# Patient Record
Sex: FEMALE | Race: BLACK OR AFRICAN AMERICAN | Employment: OTHER | URBAN - METROPOLITAN AREA
[De-identification: names, ages, dates, MRNs, and addresses within clinical notes are randomized per-mention and may not be internally consistent; named-entity substitution may affect disease eponyms.]

---

## 2017-01-24 RX ORDER — POTASSIUM CHLORIDE 20 MEQ/1
20 TABLET, EXTENDED RELEASE ORAL DAILY
Qty: 90 TAB | Refills: 3 | Status: SHIPPED | OUTPATIENT
Start: 2017-01-24 | End: 2018-01-29 | Stop reason: SDUPTHER

## 2017-01-24 RX ORDER — TERAZOSIN 5 MG/1
5 CAPSULE ORAL
Qty: 90 CAP | Refills: 3 | Status: CANCELLED | OUTPATIENT
Start: 2017-01-24

## 2017-01-24 NOTE — TELEPHONE ENCOUNTER
Medication refill per verbal order Dr. Yvan Huddleston. Will need to contact mail order in regards to Terazosin. Still has refills remaining.

## 2017-01-24 NOTE — TELEPHONE ENCOUNTER
Pt states she gets her medication thru [employer] they mail it to her she is 80yrs old she can't go to pharmacy so she wants it sent to her [phone]

## 2017-02-08 ENCOUNTER — OFFICE VISIT (OUTPATIENT)
Dept: CARDIOLOGY CLINIC | Age: 82
End: 2017-02-08

## 2017-02-08 VITALS
HEART RATE: 57 BPM | BODY MASS INDEX: 25.49 KG/M2 | HEIGHT: 61 IN | SYSTOLIC BLOOD PRESSURE: 100 MMHG | DIASTOLIC BLOOD PRESSURE: 52 MMHG | OXYGEN SATURATION: 96 % | WEIGHT: 135 LBS

## 2017-02-08 DIAGNOSIS — I48.0 PAROXYSMAL ATRIAL FIBRILLATION (HCC): Primary | ICD-10-CM

## 2017-02-08 DIAGNOSIS — I35.9 AORTIC VALVE DISORDER: ICD-10-CM

## 2017-02-08 DIAGNOSIS — I10 ESSENTIAL HYPERTENSION, BENIGN: ICD-10-CM

## 2017-02-08 NOTE — PROGRESS NOTES
HISTORY OF PRESENT ILLNESS  Susie Paz is a 80 y.o. female. HPI  She looks marvelous. She appears to be at least ten years younger than her stated age of [de-identified]. She does not have hearing loss and she has all of her teeth with the exception of a few missing teeth in the back. She does not have much joint problems either. She is very alert and intelligent. She has had very little, if any, palpitations. She denies chest pain, resting dyspnea, orthopnea or PND. She does have very mild dyspnea on exertion. Her renal function is adequate with BUN/creatinine at 20 and 0.91, respectively, on 08/20/2016. Her cholesterol profile is excellent with HDL all the way up to 79 and LDL of 89. Her hemoglobin A1C was at 5.7%. She has a history of hypertension and acute pulmonary edema in 1988 at which time she had normal cardiac catheterization and normal left ventricular systolic function with hyperdynamic contractions. There was evidence of left ventricular diastolic dysfunction at that time which was felt to be the etiology of her pulmonary edema.    She had the atrial fibrillation for which she was anticoagulated with Coumadin for a while until April 2012 when she was seen in my office when she was found to be in sinus rhythm. Because of the fact that she was in sinus rhythm and her age was in the mid 80s, a decision was made to discontinue Coumadin altogether. She has since remained in sinus rhythm. Review of Systems   Constitutional: Negative for malaise/fatigue and weight loss. HENT: Negative for hearing loss. Eyes: Negative for blurred vision and double vision. Respiratory: Negative for shortness of breath. Cardiovascular: Positive for leg swelling. Negative for chest pain, palpitations, orthopnea, claudication and PND. Gastrointestinal: Negative for blood in stool, heartburn and melena. Genitourinary: Negative for dysuria, frequency, hematuria and urgency. Musculoskeletal: Positive for joint pain. Negative for back pain. Skin: Negative for rash. Neurological: Negative for dizziness, loss of consciousness, weakness and headaches. Psychiatric/Behavioral: Negative for depression and memory loss. Physical Exam   Constitutional: She is oriented to person, place, and time. She appears well-developed and well-nourished. HENT:   Head: Normocephalic and atraumatic. Eyes: Conjunctivae are normal. Pupils are equal, round, and reactive to light. Neck: Normal range of motion. Neck supple. No JVD present. Cardiovascular: Regular rhythm, S1 normal and S2 normal.   No extrasystoles are present. Bradycardia present. PMI is not displaced. Exam reveals no gallop and no friction rub. Murmur heard. Harsh early systolic murmur is present with a grade of 2/6  at the upper right sternal border radiating to the neck  Pulses:       Carotid pulses are 3+ on the right side with bruit, and 3+ on the left side with bruit. Pulmonary/Chest: Effort normal. She has no rales. Abdominal: Soft. There is no tenderness. Musculoskeletal: She exhibits edema. trace   Neurological: She is alert and oriented to person, place, and time. No cranial nerve deficit. Skin: Skin is warm and dry. Psychiatric: She has a normal mood and affect. Her behavior is normal.     Visit Vitals    /52    Pulse (!) 57    Ht 5' 1\" (1.549 m)    Wt 61.2 kg (135 lb)    SpO2 96%    BMI 25.51 kg/m2       Past Medical History   Diagnosis Date    Cardiac echocardiogram 03/21/2008     EF 60-65%. No RWMA. RVSP 35 mmHg. Mild AI. Mild MR. Mild TR. Mild PI.  Cardiac nuclear imaging test 03/29/2004     A-fib. No evidence of ischemia or prior infarction. EF 59%. No WMA.  Cardiovascular lower extremity venous duplex 10/05/2011     No deep vein or superficial thrombosis bilaterally. Bilateral calf veins w/calcific changes.     Diabetes ([address] 04/2006    Essential hypertension, benign     GERD (gastroesophageal reflux disease)     Hypercholesterolemia     Hypertension     Long term (current) use of anticoagulants 3/14/2011    Paroxysmal atrial fibrillation (HCC)     S/P total hysterectomy late        Social History     Social History    Marital status:      Spouse name: N/A    Number of children: N/A    Years of education: N/A     Occupational History    Not on file. Social History Main Topics    Smoking status: Never Smoker    Smokeless tobacco: Never Used    Alcohol use No    Drug use: No    Sexual activity: Not on file     Other Topics Concern    Not on file     Social History Narrative       Family History   Problem Relation Age of Onset    Hypertension Mother     Heart Failure Father       at age 80 from CHF    Diabetes Sister     Diabetes Brother        Past Surgical History   Procedure Laterality Date    Hx hysterectomy  late     Hx cataract removal       bilateral       Current Outpatient Prescriptions   Medication Sig Dispense Refill    potassium chloride (K-DUR, KLOR-CON) 20 mEq tablet Take 1 Tab by mouth daily. 90 Tab 3    acetaminophen (TYLENOL EXTRA STRENGTH) 500 mg tablet Take 1 Tab by mouth every six (6) hours as needed. 45 Tab 2    triamcinolone acetonide (KENALOG) 0.5 % ointment Apply  to affected area two (2) times a day. Apply 1/2 pea sized amount to wrists twice daily as needed for 2 weeks. 30 g 0    lansoprazole (PREVACID) 30 mg capsule Take 1 Cap by mouth Daily (before breakfast). 90 Cap 3    lisinopril (PRINIVIL, ZESTRIL) 20 mg tablet Take 1 Tab by mouth daily. 180 Tab 3    terazosin (HYTRIN) 5 mg capsule Take 1 Cap by mouth nightly. 90 Cap 3    digoxin (LANOXIN) 0.125 mg tablet Take 1 Tab by mouth daily. 90 Tab 3    gemfibrozil (LOPID) 600 mg tablet Take 1 Tab by mouth daily. 90 Tab 3    chlorthalidone (HYGROTEN) 25 mg tablet Take 1 Tab by mouth daily.  7 Tab 0    metoprolol succinate (TOPROL-XL) 25 mg XL tablet Take 1 Tab by mouth daily. 90 Tab 3    metFORMIN (GLUCOPHAGE) 500 mg tablet Take 1 Tab by mouth two (2) times daily (with meals). 180 Tab 3    aspirin 81 mg tablet Take 1 Tab by mouth daily. 1 Tab 0    glucose blood VI test strips (ACCU-CHEK COMPACT TEST) strip by Does Not Apply route. Use as directed 1 Package 11       EKG: unchanged from previous tracings, sinus bradycardia, RBBB, 1st degree AV block. ASSESSMENT and PLAN  Encounter Diagnoses   Name Primary?  Paroxysmal atrial fibrillation (HCC) Yes    Essential hypertension, benign     Aortic valve disorder,mild stenosis    She has been doing extremely well. She looks at least ten years younger than her age. She has had no symptoms to indicate recurrent atrial fibrillation. Her blood pressure has been under control and her cholesterol profile is excellent. Her aortic valve stenosis remains to be very mild by auscultation.

## 2017-02-08 NOTE — PROGRESS NOTES
1. Have you been to the ER, urgent care clinic since your last visit? Hospitalized since your last visit? no  2. Have you seen or consulted any other health care providers outside of the [address] [place] [address] since your last visit? Include any pap smears or colon screening.  no

## 2017-02-08 NOTE — MR AVS SNAPSHOT
Visit Information Date & Time Provider Department Dept. Phone Encounter #  
 2/8/2017  9:00 AM Bambi Jacobo MD [facility] [id number] Follow-up Instructions Return in about 6 months (around 8/8/2017), or if symptoms worsen or fail to improve. Your Appointments 2/14/2017 10:40 AM  
ROUTINE CARE with Epifanio Steele MD  
[address] [place] [address] ([facility]) Appt Note: 6 m fu htn/chol [address] [id number] 2 Luis Pocahontas [address] [address] Upcoming Health Maintenance Date Due  
 EYE EXAM RETINAL OR DILATED Q1 9[birth year]/1927 FOOT EXAM Q1 8/12/2016 HEMOGLOBIN A1C Q6M 8/18/2016 GLAUCOMA SCREENING Q2Y 1/1/2017 MICROALBUMIN Q1 2/18/2017 Pneumococcal 65+ Low/Medium Risk (2 of 2 - PPSV23) 8/16/2017 MEDICARE YEARLY EXAM 8/17/2017 LIPID PANEL Q1 8/30/2017 DTaP/Tdap/Td series (2 - Td) 8/16/2026 Allergies as of 2/8/2017  Review Complete On: 2/8/2017 By: Bambi Jacobo MD  
 Not on File Current Immunizations  Never Reviewed No immunizations on file. Not reviewed this visit You Were Diagnosed With   
  
 Codes Comments Paroxysmal atrial fibrillation (HCC)    -  Primary ICD-10-CM: I48.0 ICD-9-CM: 427.31 Essential hypertension, benign     ICD-10-CM: I10 
ICD-9-CM: 401.1 Aortic valve disorder     ICD-10-CM: I35.9 ICD-9-CM: 424.1 Vitals BP Pulse Height(growth percentile) Weight(growth percentile) SpO2 BMI  
 100/52 (!) 57 5' 1\" (1.549 m) 135 lb (61.2 kg) 96% 25.51 kg/m2 OB Status Smoking Status Hysterectomy Never Smoker Vitals History BMI and BSA Data Body Mass Index Body Surface Area 25.51 kg/m 2 1.62 m 2 Preferred Pharmacy Pharmacy Name Phone  [address] [phone] Your Updated Medication List  
  
   
 This list is accurate as of: 2/8/17  9:48 AM.  Always use your most recent med list.  
  
  
  
  
 acetaminophen 500 mg tablet Commonly known as:  [address] Take 1 Tab by mouth every six (6) hours as needed. aspirin 81 mg tablet Take 1 Tab by mouth daily. chlorthalidone 25 mg tablet Commonly known as:  [place] Take 1 Tab by mouth daily. digoxin 0.125 mg tablet Commonly known as:  LANOXIN Take 1 Tab by mouth daily. gemfibrozil 600 mg tablet Commonly known as:  LOPID Take 1 Tab by mouth daily. glucose blood VI test strips strip Commonly known as:  ACCU-CHEK COMPACT TEST  
by Does Not Apply route. Use as directed  
  
 lansoprazole 30 mg capsule Commonly known as:  PREVACID Take 1 Cap by mouth Daily (before breakfast). lisinopril 20 mg tablet Commonly known as:  Maria Elena Gracia Take 1 Tab by mouth daily. metFORMIN 500 mg tablet Commonly known as:  GLUCOPHAGE Take 1 Tab by mouth two (2) times daily (with meals). metoprolol succinate 25 mg XL tablet Commonly known as:  TOPROL-XL Take 1 Tab by mouth daily. potassium chloride 20 mEq tablet Commonly known as:  K-DUR, KLOR-CON Take 1 Tab by mouth daily. terazosin 5 mg capsule Commonly known as:  HYTRIN Take 1 Cap by mouth nightly. triamcinolone acetonide 0.5 % ointment Commonly known as:  KENALOG Apply  to affected area two (2) times a day. Apply 1/2 pea sized amount to wrists twice daily as needed for 2 weeks. We Performed the Following AMB POC EKG ROUTINE W/ 12 LEADS, INTER & REP [58411 CPT(R)] Follow-up Instructions Return in about 6 months (around 8/8/2017), or if symptoms worsen or fail to improve. Introducing [facility]! Jaden Mireles introduces [employer] patient portal. Now you can access parts of your medical record, email your doctor's office, and request medication refills online. 1. In your internet browser, go to [email or web address] 2. Click on the First Time User? Click Here link in the Sign In box. You will see the New Member Sign Up page. 3. Enter your [employer] Access Code exactly as it appears below. You will not need to use this code after youve completed the sign-up process. If you do not sign up before the expiration date, you must request a new code. · [employer] Access Code: [email or web address] Expires: 5/9/2017  8:50 AM 
 
4. Enter the last four digits of your Social Security Number (xxxx) and Date of Birth (mm/dd/yyyy) as indicated and click Submit. You will be taken to the next sign-up page. 5. Create a [employer] ID. This will be your [employer] login ID and cannot be changed, so think of one that is secure and easy to remember. 6. Create a [employer] password. You can change your password at any time. 7. Enter your Password Reset Question and Answer. This can be used at a later time if you forget your password. 8. Enter your e-mail address. You will receive e-mail notification when new information is available in [address] 9. Click Sign Up. You can now view and download portions of your medical record. 10. Click the Download Summary menu link to download a portable copy of your medical information. If you have questions, please visit the Frequently Asked Questions section of the [employer] website. Remember, [employer] is NOT to be used for urgent needs. For medical emergencies, dial 911. Now available from your iPhone and Android! Please provide this summary of care documentation to your next provider. Your primary care clinician is listed as [address]. If you have any questions after today's visit, please call [phone].

## 2017-02-14 ENCOUNTER — OFFICE VISIT (OUTPATIENT)
Dept: FAMILY MEDICINE CLINIC | Age: 82
End: 2017-02-14

## 2017-02-14 ENCOUNTER — HOSPITAL ENCOUNTER (OUTPATIENT)
Dept: LAB | Age: 82
Discharge: HOME | End: 2017-02-14
Payer: MEDICARE

## 2017-02-14 VITALS
HEART RATE: 56 BPM | TEMPERATURE: 97.9 F | BODY MASS INDEX: 25.68 KG/M2 | DIASTOLIC BLOOD PRESSURE: 74 MMHG | RESPIRATION RATE: 12 BRPM | SYSTOLIC BLOOD PRESSURE: 126 MMHG | OXYGEN SATURATION: 96 % | WEIGHT: 136 LBS | HEIGHT: 61 IN

## 2017-02-14 DIAGNOSIS — E11.9 DIABETES MELLITUS WITHOUT COMPLICATION (HCC): Primary | ICD-10-CM

## 2017-02-14 DIAGNOSIS — E78.00 HYPERCHOLESTEROLEMIA: ICD-10-CM

## 2017-02-14 DIAGNOSIS — E11.9 DIABETES MELLITUS WITHOUT COMPLICATION (HCC): ICD-10-CM

## 2017-02-14 DIAGNOSIS — I10 ESSENTIAL HYPERTENSION, BENIGN: ICD-10-CM

## 2017-02-14 DIAGNOSIS — N32.81 OAB (OVERACTIVE BLADDER): ICD-10-CM

## 2017-02-14 LAB
ALT SERPL-CCNC: 12 U/L (ref 13–56)
ANION GAP BLD CALC-SCNC: 8 MMOL/L (ref 3–18)
AST SERPL W P-5'-P-CCNC: 9 U/L (ref 15–37)
BUN SERPL-MCNC: 22 MG/DL (ref 7–18)
BUN/CREAT SERPL: 23 (ref 12–20)
CALCIUM SERPL-MCNC: 9.7 MG/DL (ref 8.5–10.1)
CHLORIDE SERPL-SCNC: 104 MMOL/L (ref 100–108)
CHOLEST SERPL-MCNC: 186 MG/DL
CO2 SERPL-SCNC: 28 MMOL/L (ref 21–32)
CREAT SERPL-MCNC: 0.94 MG/DL (ref 0.6–1.3)
EST. AVERAGE GLUCOSE BLD GHB EST-MCNC: 126 MG/DL
GLUCOSE SERPL-MCNC: 85 MG/DL (ref 74–99)
HBA1C MFR BLD: 6 % (ref 4.2–5.6)
HDLC SERPL-MCNC: 77 MG/DL (ref 40–60)
HDLC SERPL: 2.4 {RATIO} (ref 0–5)
LDLC SERPL CALC-MCNC: 91.8 MG/DL (ref 0–100)
LIPID PROFILE,FLP: ABNORMAL
POTASSIUM SERPL-SCNC: 4.2 MMOL/L (ref 3.5–5.5)
SODIUM SERPL-SCNC: 140 MMOL/L (ref 136–145)
TRIGL SERPL-MCNC: 86 MG/DL (ref ?–150)
VLDLC SERPL CALC-MCNC: 17.2 MG/DL

## 2017-02-14 PROCEDURE — 82043 UR ALBUMIN QUANTITATIVE: CPT | Performed by: FAMILY MEDICINE

## 2017-02-14 PROCEDURE — 80048 BASIC METABOLIC PNL TOTAL CA: CPT | Performed by: FAMILY MEDICINE

## 2017-02-14 PROCEDURE — 36415 COLL VENOUS BLD VENIPUNCTURE: CPT | Performed by: FAMILY MEDICINE

## 2017-02-14 PROCEDURE — 84460 ALANINE AMINO (ALT) (SGPT): CPT | Performed by: FAMILY MEDICINE

## 2017-02-14 PROCEDURE — 80061 LIPID PANEL: CPT | Performed by: FAMILY MEDICINE

## 2017-02-14 PROCEDURE — 83036 HEMOGLOBIN GLYCOSYLATED A1C: CPT | Performed by: FAMILY MEDICINE

## 2017-02-14 PROCEDURE — 84450 TRANSFERASE (AST) (SGOT): CPT | Performed by: FAMILY MEDICINE

## 2017-02-14 RX ORDER — METFORMIN HYDROCHLORIDE 500 MG/1
500 TABLET ORAL 2 TIMES DAILY WITH MEALS
Qty: 180 TAB | Refills: 3 | Status: SHIPPED | COMMUNITY
Start: 2017-02-14 | End: 2018-03-06 | Stop reason: SDUPTHER

## 2017-02-14 RX ORDER — METOPROLOL SUCCINATE 25 MG/1
25 TABLET, EXTENDED RELEASE ORAL DAILY
Qty: 90 TAB | Refills: 3 | Status: SHIPPED | COMMUNITY
Start: 2017-02-14 | End: 2018-05-21 | Stop reason: SDUPTHER

## 2017-02-14 NOTE — PROGRESS NOTES
HISTORY OF PRESENT ILLNESS  Heidi Wild is a 80 y.o. female. HPI  Patient is here today for evaluation and treatment of: Hypertension/Cholesterol problem    Hypertension: Pt is on meds as listed below; she tolerates med well; Cholesterol: She is prescribed lopid. She attempts a lower cholesterol diet    Still c/o some urinary urge incontinence. She has difficulty holding her urine until she gets to the restroom particularly over night. Has been present for several months      Current Outpatient Prescriptions:     ACETAMINOPHEN (TYLENOL ARTHRITIS PAIN PO), Take  by mouth., Disp: , Rfl:     potassium chloride (K-DUR, KLOR-CON) 20 mEq tablet, Take 1 Tab by mouth daily. , Disp: 90 Tab, Rfl: 3    acetaminophen (TYLENOL EXTRA STRENGTH) 500 mg tablet, Take 1 Tab by mouth every six (6) hours as needed. , Disp: 45 Tab, Rfl: 2    lansoprazole (PREVACID) 30 mg capsule, Take 1 Cap by mouth Daily (before breakfast). , Disp: 90 Cap, Rfl: 3    lisinopril (PRINIVIL, ZESTRIL) 20 mg tablet, Take 1 Tab by mouth daily. , Disp: 180 Tab, Rfl: 3    terazosin (HYTRIN) 5 mg capsule, Take 1 Cap by mouth nightly., Disp: 90 Cap, Rfl: 3    digoxin (LANOXIN) 0.125 mg tablet, Take 1 Tab by mouth daily. , Disp: 90 Tab, Rfl: 3    gemfibrozil (LOPID) 600 mg tablet, Take 1 Tab by mouth daily. , Disp: 90 Tab, Rfl: 3    chlorthalidone (HYGROTEN) 25 mg tablet, Take 1 Tab by mouth daily. , Disp: 7 Tab, Rfl: 0    metoprolol succinate (TOPROL-XL) 25 mg XL tablet, Take 1 Tab by mouth daily. , Disp: 90 Tab, Rfl: 3    metFORMIN (GLUCOPHAGE) 500 mg tablet, Take 1 Tab by mouth two (2) times daily (with meals). , Disp: 180 Tab, Rfl: 3    aspirin 81 mg tablet, Take 1 Tab by mouth daily. , Disp: 1 Tab, Rfl: 0    glucose blood VI test strips (ACCU-CHEK COMPACT TEST) strip, by Does Not Apply route.  Use as directed, Disp: 1 Package, Rfl: 11      PMH,  Meds, Allergies, Family History, Social history reviewed    Review of Systems   Constitutional: Negative for chills and fever. Cardiovascular: Negative for chest pain and palpitations. Physical Exam   Constitutional: She appears well-developed and well-nourished. No distress. Cardiovascular: Normal rate and regular rhythm. Exam reveals no gallop and no friction rub. No murmur heard. Pulmonary/Chest: Breath sounds normal. No respiratory distress. She has no wheezes. She has no rales. Musculoskeletal: She exhibits no edema. Nursing note and vitals reviewed. ASSESSMENT and PLAN    ICD-10-CM ICD-9-CM    1. Diabetes mellitus without complication (HCC) B22.4 250.00 MICROALBUMIN, UR, RAND W/ MICROALBUMIN/CREA RATIO      HEMOGLOBIN A1C WITH EAG      REFERRAL TO OPHTHALMOLOGY   2. Hypercholesterolemia E78.00 272.0 LIPID PANEL      AST      ALT   3. Essential hypertension, benign Q79 910.6 METABOLIC PANEL, BASIC   4. OAB (overactive bladder)- new N32.81 596.51 mirabegron ER (MYRBETRIQ) 25 mg ER tablet      AMB POC URINALYSIS DIP STICK AUTO W/ MICRO       As above  above all stable unless otherwise noted  Labs as ordered  Continue current meds as ordered  Add myrbetric as ordered  Follow-up Disposition:  Return in about 6 months (around 8/14/2017) for medicare well 8/2017. An After Visit Summary was printed and given to the patient. This has been fully explained to the patient, who indicates understanding.

## 2017-02-14 NOTE — MR AVS SNAPSHOT
Visit Information Date & Time Provider Department Dept. Phone Encounter #  
 2/14/2017 10:40 AM Sowmya Limon MD [facility] [place] [id number] Follow-up Instructions Return in about 6 months (around 8/14/2017) for medicare well 8/2017. Your Appointments 8/15/2017 10:20 AM  
Follow Up with Debbi Valencia MD  
[facility] ([address]) Appt Note: 6 month f/up [place] [id number] [address] [address] [phone] [facility] [address] Upcoming Health Maintenance Date Due  
 FOOT EXAM Q1 3/14/2017* GLAUCOMA SCREENING Q2Y 6/21/2017* EYE EXAM RETINAL OR DILATED Q1 6/21/2017* HEMOGLOBIN A1C Q6M 8/14/2017 Pneumococcal 65+ Low/Medium Risk (2 of 2 - PPSV23) 8/16/2017 MEDICARE YEARLY EXAM 8/17/2017 LIPID PANEL Q1 8/30/2017 MICROALBUMIN Q1 2/14/2018 DTaP/Tdap/Td series (2 - Td) 8/16/2026 *Topic was postponed. The date shown is not the original due date. Allergies as of 2/14/2017  Review Complete On: 2/14/2017 By: Yani Mark LPN Not on File Current Immunizations  Never Reviewed No immunizations on file. Not reviewed this visit You Were Diagnosed With   
  
 Codes Comments Diabetes mellitus without complication ([facility]    -  Primary ICD-10-CM: E11.9 ICD-9-CM: 250.00 Hypercholesterolemia     ICD-10-CM: E78.00 ICD-9-CM: 272.0 Essential hypertension, benign     ICD-10-CM: I10 
ICD-9-CM: 401.1   
 OAB (overactive bladder)     ICD-10-CM: N32.81 ICD-9-CM: 596.51 Vitals BP Pulse Temp Resp Height(growth percentile) Weight(growth percentile) 126/74 (BP 1 Location: Left arm, BP Patient Position: Sitting) (!) 56 97.9 °F (36.6 °C) (Oral) 12 5' 1\" (1.549 m) 136 lb (61.7 kg) SpO2 BMI OB Status Smoking Status 96% 25.7 kg/m2 Hysterectomy Never Smoker BMI and BSA Data Body Mass Index Body Surface Area 25.7 kg/m 2 1.63 m 2 Preferred Pharmacy Pharmacy Name Phone  [address] [phone] Your Updated Medication List  
  
   
This list is accurate as of: 2/14/17 11:55 AM.  Always use your most recent med list.  
  
  
  
  
 * TYLENOL ARTHRITIS PAIN PO Take  by mouth. * acetaminophen 500 mg tablet Commonly known as:  [address] Take 1 Tab by mouth every six (6) hours as needed. aspirin 81 mg tablet Take 1 Tab by mouth daily. chlorthalidone 25 mg tablet Commonly known as:  Melissa Pulling Take 1 Tab by mouth daily. digoxin 0.125 mg tablet Commonly known as:  LANOXIN Take 1 Tab by mouth daily. gemfibrozil 600 mg tablet Commonly known as:  LOPID Take 1 Tab by mouth daily. glucose blood VI test strips strip Commonly known as:  ACCU-CHEK COMPACT TEST  
by Does Not Apply route. Use as directed  
  
 lansoprazole 30 mg capsule Commonly known as:  PREVACID Take 1 Cap by mouth Daily (before breakfast). lisinopril 20 mg tablet Commonly known as:  Aundra Patricia Take 1 Tab by mouth daily. metFORMIN 500 mg tablet Commonly known as:  GLUCOPHAGE Take 1 Tab by mouth two (2) times daily (with meals). metoprolol succinate 25 mg XL tablet Commonly known as:  TOPROL-XL Take 1 Tab by mouth daily. mirabegron ER 25 mg ER tablet Commonly known as:  MYRBETRIQ Take 1 Tab by mouth daily. potassium chloride 20 mEq tablet Commonly known as:  K-DUR, KLOR-CON Take 1 Tab by mouth daily. terazosin 5 mg capsule Commonly known as:  HYTRIN Take 1 Cap by mouth nightly. * Notice: This list has 2 medication(s) that are the same as other medications prescribed for you. Read the directions carefully, and ask your doctor or other care provider to review them with you. Prescriptions Sent to Mail Order Refills  
 metoprolol succinate (TOPROL-XL) 25 mg XL tablet 3 Sig: Take 1 Tab by mouth daily. Class: Mail Order Pharmacy: [place] [address] Ph #: [phone] Route: Oral  
 metFORMIN (GLUCOPHAGE) 500 mg tablet 3 Sig: Take 1 Tab by mouth two (2) times daily (with meals). Class: Mail Order Pharmacy: [address] Ph #: [phone] Route: Oral  
 mirabegron ER (MYRBETRIQ) 25 mg ER tablet 3 Sig: Take 1 Tab by mouth daily. Class: Mail Order Pharmacy: [place] [address] Ph #: [phone] Route: Oral  
  
We Performed the Following AMB POC URINALYSIS DIP STICK AUTO W/ MICRO [77115 CPT(R)] REFERRAL TO OPHTHALMOLOGY [REF57 Custom] Comments:  
 Please evaluate patient for diabetic eye exam 
. Follow-up Instructions Return in about 6 months (around 8/14/2017) for medicare well 8/2017. To-Do List   
 02/14/2017 Lab:  ALT   
  
 02/14/2017 Lab:  AST   
  
 02/14/2017 Lab:  HEMOGLOBIN A1C WITH EAG   
  
 02/14/2017 Lab:  LIPID PANEL   
  
 02/14/2017 Lab:  METABOLIC PANEL, BASIC   
  
 02/14/2017 Lab:  MICROALBUMIN, UR, RAND W/ MICROALBUMIN/CREA RATIO Referral Information Referral ID Referred By Referred To  
  
 [id number] Colby Porter MD   
   [address] [place] [address], [place] [address] [address] [place] [address] [address] Phone: [phone] Fax: [phone] Visits Status Start Date End Date 1 New Request 2/14/17 2/14/18 If your referral has a status of pending review or denied, additional information will be sent to support the outcome of this decision. Patient Instructions Learning About Diabetes Food Guidelines Your Care Instructions Meal planning is important to manage diabetes. It helps keep your blood sugar at a target level (which you set with your doctor).  You don't have to eat special foods. You can eat what your family eats, including sweets once in a while. But you do have to pay attention to how often you eat and how much you eat of certain foods. You may want to work with a dietitian or a certified diabetes educator (CDE) to help you plan meals and snacks. A dietitian or CDE can also help you lose weight if that is one of your goals. What should you know about eating carbs? Managing the amount of carbohydrate (carbs) you eat is an important part of healthy meals when you have diabetes. Carbohydrate is found in many foods. · Learn which foods have carbs. And learn the amounts of carbs in different foods. ¨ Bread, cereal, pasta, and rice have about 15 grams of carbs in a serving. A serving is 1 slice of bread (1 ounce), ½ cup of cooked cereal, or 1/3 cup of cooked pasta or rice. ¨ Fruits have 15 grams of carbs in a serving. A serving is 1 small fresh fruit, such as an apple or orange; ½ of a banana; ½ cup of cooked or canned fruit; ½ cup of fruit juice; 1 cup of melon or raspberries; or 2 tablespoons of dried fruit. ¨ Milk and no-sugar-added yogurt have 15 grams of carbs in a serving. A serving is 1 cup of milk or 2/3 cup of no-sugar-added yogurt. ¨ Starchy vegetables have 15 grams of carbs in a serving. A serving is ½ cup of mashed potatoes or sweet potato; 1 cup winter squash; ½ of a small baked potato; ½ cup of cooked beans; or ½ cup cooked corn or green peas. · Learn how much carbs to eat each day and at each meal. A dietitian or CDE can teach you how to keep track of the amount of carbs you eat. This is called carbohydrate counting. · If you are not sure how to count carbohydrate grams, use the Plate Method to plan meals. It is a good, quick way to make sure that you have a balanced meal. It also helps you spread carbs throughout the day. ¨ Divide your plate by types of foods.  Put non-starchy vegetables on half the plate, meat or other protein food on one-quarter of the plate, and a grain or starchy vegetable in the final quarter of the plate. To this you can add a small piece of fruit and 1 cup of milk or yogurt, depending on how many carbs you are supposed to eat at a meal. 
· Try to eat about the same amount of carbs at each meal. Do not \"save up\" your daily allowance of carbs to eat at one meal. 
· Proteins have very little or no carbs per serving. Examples of proteins are beef, chicken, turkey, fish, eggs, tofu, cheese, cottage cheese, and peanut butter. A serving size of meat is 3 ounces, which is about the size of a deck of cards. Examples of meat substitute serving sizes (equal to 1 ounce of meat) are 1/4 cup of cottage cheese, 1 egg, 1 tablespoon of peanut butter, and ½ cup of tofu. How can you eat out and still eat healthy? · Learn to estimate the serving sizes of foods that have carbohydrate. If you measure food at home, it will be easier to estimate the amount in a serving of restaurant food. · If the meal you order has too much carbohydrate (such as potatoes, corn, or baked beans), ask to have a low-carbohydrate food instead. Ask for a salad or green vegetables. · If you use insulin, check your blood sugar before and after eating out to help you plan how much to eat in the future. · If you eat more carbohydrate at a meal than you had planned, take a walk or do other exercise. This will help lower your blood sugar. What else should you know? · Limit saturated fat, such as the fat from meat and dairy products. This is a healthy choice because people who have diabetes are at higher risk of heart disease. So choose lean cuts of meat and nonfat or low-fat dairy products. Use olive or canola oil instead of butter or shortening when cooking. · Don't skip meals. Your blood sugar may drop too low if you skip meals and take insulin or certain medicines for diabetes. · Check with your doctor before you drink alcohol. Alcohol can cause your blood sugar to drop too low. Alcohol can also cause a bad reaction if you take certain diabetes medicines. Follow-up care is a key part of your treatment and safety. Be sure to make and go to all appointments, and call your doctor if you are having problems. It's also a good idea to know your test results and keep a list of the medicines you take. Where can you learn more? Go to http://juju-karol.info/. Enter Y111 in the search box to learn more about \"Learning About Diabetes Food Guidelines. \" Current as of: May 23, 2016 Content Version: 11.1 © 5201-0991 [employer]. Care instructions adapted under license by [employer] (which disclaims liability or warranty for this information). If you have questions about a medical condition or this instruction, always ask your healthcare professional. [place] [address] any warranty or liability for your use of this information. Introducing \[facility]\"" & HEALTH SERVICES! Fei Stevens introduces [email or web address] patient portal. Now you can access parts of your medical record, email your doctor's office, and request medication refills online. 1. In your internet browser, go to [email or web address] 2. Click on the First Time User? Click Here link in the Sign In box. You will see the New Member Sign Up page. 3. Enter your [email or web address] Access Code exactly as it appears below. You will not need to use this code after youve completed the sign-up process. If you do not sign up before the expiration date, you must request a new code. · [email or web address] Access Code: [email or web address] Expires: 5/9/2017  8:50 AM 
 
4. Enter the last four digits of your Social Security Number (xxxx) and Date of Birth (mm/dd/yyyy) as indicated and click Submit. You will be taken to the next sign-up page. 5. Create a IdenIve ID. This will be your IdenIve login ID and cannot be changed, so think of one that is secure and easy to remember. 6. Create a IdenIve password. You can change your password at any time. 7. Enter your Password Reset Question and Answer. This can be used at a later time if you forget your password. 8. Enter your e-mail address. You will receive e-mail notification when new information is available in [address] 9. Click Sign Up. You can now view and download portions of your medical record. 10. Click the Download Summary menu link to download a portable copy of your medical information. If you have questions, please visit the Frequently Asked Questions section of the IdenIve website. Remember, IdenIve is NOT to be used for urgent needs. For medical emergencies, dial 911. Now available from your iPhone and Android! Please provide this summary of care documentation to your next provider. Your primary care clinician is listed as [address]. If you have any questions after today's visit, please call [phone].

## 2017-02-14 NOTE — PATIENT INSTRUCTIONS

## 2017-02-14 NOTE — PROGRESS NOTES
1. Have you been to the ER, urgent care clinic since your last visit? Hospitalized since your last visit? No    2. Have you seen or consulted any other health care providers outside of the [address] [place] [address] since your last visit? Include any pap smears or colon screening.  No

## 2017-02-15 ENCOUNTER — TELEPHONE (OUTPATIENT)
Dept: FAMILY MEDICINE CLINIC | Age: 82
End: 2017-02-15

## 2017-02-15 LAB
CREAT UR-MCNC: 66.11 MG/DL (ref 30–125)
MICROALBUMIN UR-MCNC: 0.2 MG/DL (ref 0–3)
MICROALBUMIN/CREAT UR-RTO: 3 MG/G (ref 0–30)

## 2017-02-15 NOTE — TELEPHONE ENCOUNTER
[facility] called after receiving a referral for diabetic eye exam. Pt is established at [facility] with Dr. Radha Ramesh and had last exam on 11/28/2016. Apt scheduled for a one year Diabetic exam in December 2017.

## 2017-04-10 RX ORDER — CHLORTHALIDONE 25 MG/1
25 TABLET ORAL DAILY
Qty: 90 TAB | Refills: 3 | Status: SHIPPED | OUTPATIENT
Start: 2017-04-10 | End: 2018-03-26 | Stop reason: SDUPTHER

## 2017-04-10 RX ORDER — GEMFIBROZIL 600 MG/1
600 TABLET, FILM COATED ORAL DAILY
Qty: 90 TAB | Refills: 3 | Status: SHIPPED | OUTPATIENT
Start: 2017-04-10 | End: 2018-04-09 | Stop reason: SDUPTHER

## 2017-05-29 ENCOUNTER — APPOINTMENT (OUTPATIENT)
Dept: GENERAL RADIOLOGY | Age: 82
End: 2017-05-29
Attending: PHYSICIAN ASSISTANT
Payer: MEDICARE

## 2017-05-29 ENCOUNTER — HOSPITAL ENCOUNTER (EMERGENCY)
Age: 82
Discharge: HOME | End: 2017-05-29
Attending: EMERGENCY MEDICINE
Payer: MEDICARE

## 2017-05-29 VITALS
TEMPERATURE: 99.1 F | OXYGEN SATURATION: 100 % | SYSTOLIC BLOOD PRESSURE: 128 MMHG | BODY MASS INDEX: 22.5 KG/M2 | HEART RATE: 63 BPM | RESPIRATION RATE: 18 BRPM | DIASTOLIC BLOOD PRESSURE: 72 MMHG | WEIGHT: 127 LBS | HEIGHT: 63 IN

## 2017-05-29 DIAGNOSIS — M79.661 PAIN IN BOTH LOWER LEGS: ICD-10-CM

## 2017-05-29 DIAGNOSIS — M79.662 PAIN IN BOTH LOWER LEGS: ICD-10-CM

## 2017-05-29 DIAGNOSIS — R60.0 PEDAL EDEMA: Primary | ICD-10-CM

## 2017-05-29 LAB
ALBUMIN SERPL BCP-MCNC: 3.7 G/DL (ref 3.4–5)
ALBUMIN/GLOB SERPL: 1.3 {RATIO} (ref 0.8–1.7)
ALP SERPL-CCNC: 73 U/L (ref 45–117)
ALT SERPL-CCNC: 11 U/L (ref 13–56)
ANION GAP BLD CALC-SCNC: 11 MMOL/L (ref 3–18)
AST SERPL W P-5'-P-CCNC: 22 U/L (ref 15–37)
BASOPHILS # BLD AUTO: 0 K/UL (ref 0–0.06)
BASOPHILS # BLD: 0 % (ref 0–2)
BILIRUB SERPL-MCNC: 0.4 MG/DL (ref 0.2–1)
BNP SERPL-MCNC: 408 PG/ML (ref 0–1800)
BUN SERPL-MCNC: 29 MG/DL (ref 7–18)
BUN/CREAT SERPL: 36 (ref 12–20)
CALCIUM SERPL-MCNC: 9.5 MG/DL (ref 8.5–10.1)
CHLORIDE SERPL-SCNC: 103 MMOL/L (ref 100–108)
CK MB CFR SERPL CALC: NORMAL % (ref 0–4)
CK MB SERPL-MCNC: <1 NG/ML (ref 5–25)
CK SERPL-CCNC: 101 U/L (ref 26–192)
CO2 SERPL-SCNC: 27 MMOL/L (ref 21–32)
CREAT SERPL-MCNC: 0.8 MG/DL (ref 0.6–1.3)
DIFFERENTIAL METHOD BLD: ABNORMAL
DIGOXIN SERPL-MCNC: 0.9 NG/ML (ref 0.9–2)
EOSINOPHIL # BLD: 0.2 K/UL (ref 0–0.4)
EOSINOPHIL NFR BLD: 2 % (ref 0–5)
ERYTHROCYTE [DISTWIDTH] IN BLOOD BY AUTOMATED COUNT: 14.4 % (ref 11.6–14.5)
GLOBULIN SER CALC-MCNC: 2.9 G/DL (ref 2–4)
GLUCOSE SERPL-MCNC: 96 MG/DL (ref 74–99)
HCT VFR BLD AUTO: 36.3 % (ref 35–45)
HGB BLD-MCNC: 11.2 G/DL (ref 12–16)
INR PPP: 1.1 (ref 0.8–1.2)
LYMPHOCYTES # BLD AUTO: 25 % (ref 21–52)
LYMPHOCYTES # BLD: 1.8 K/UL (ref 0.9–3.6)
MAGNESIUM SERPL-MCNC: 1.8 MG/DL (ref 1.6–2.6)
MCH RBC QN AUTO: 26.3 PG (ref 24–34)
MCHC RBC AUTO-ENTMCNC: 30.9 G/DL (ref 31–37)
MCV RBC AUTO: 85.2 FL (ref 74–97)
MONOCYTES # BLD: 0.9 K/UL (ref 0.05–1.2)
MONOCYTES NFR BLD AUTO: 12 % (ref 3–10)
NEUTS SEG # BLD: 4.4 K/UL (ref 1.8–8)
NEUTS SEG NFR BLD AUTO: 61 % (ref 40–73)
PLATELET # BLD AUTO: 241 K/UL (ref 135–420)
PMV BLD AUTO: 10.5 FL (ref 9.2–11.8)
POTASSIUM SERPL-SCNC: 4.2 MMOL/L (ref 3.5–5.5)
PROT SERPL-MCNC: 6.6 G/DL (ref 6.4–8.2)
PROTHROMBIN TIME: 13.9 SEC (ref 11.5–15.2)
RBC # BLD AUTO: 4.26 M/UL (ref 4.2–5.3)
SODIUM SERPL-SCNC: 141 MMOL/L (ref 136–145)
TROPONIN I SERPL-MCNC: 0.02 NG/ML (ref 0–0.06)
URATE SERPL-MCNC: 5 MG/DL (ref 2.6–7.2)
WBC # BLD AUTO: 7.4 K/UL (ref 4.6–13.2)

## 2017-05-29 PROCEDURE — 82550 ASSAY OF CK (CPK): CPT

## 2017-05-29 PROCEDURE — 93970 EXTREMITY STUDY: CPT

## 2017-05-29 PROCEDURE — 84550 ASSAY OF BLOOD/URIC ACID: CPT

## 2017-05-29 PROCEDURE — 85025 COMPLETE CBC W/AUTO DIFF WBC: CPT

## 2017-05-29 PROCEDURE — 71010 XR CHEST PORT: CPT

## 2017-05-29 PROCEDURE — 80162 ASSAY OF DIGOXIN TOTAL: CPT

## 2017-05-29 PROCEDURE — 93005 ELECTROCARDIOGRAM TRACING: CPT

## 2017-05-29 PROCEDURE — 83880 ASSAY OF NATRIURETIC PEPTIDE: CPT

## 2017-05-29 PROCEDURE — 85610 PROTHROMBIN TIME: CPT

## 2017-05-29 PROCEDURE — 83735 ASSAY OF MAGNESIUM: CPT

## 2017-05-29 PROCEDURE — 99285 EMERGENCY DEPT VISIT HI MDM: CPT

## 2017-05-29 PROCEDURE — 80053 COMPREHEN METABOLIC PANEL: CPT

## 2017-05-29 RX ORDER — DEXTROMETHORPHAN HYDROBROMIDE, GUAIFENESIN 5; 100 MG/5ML; MG/5ML
650 LIQUID ORAL
COMMUNITY
End: 2017-09-21 | Stop reason: ALTCHOICE

## 2017-05-29 NOTE — ED TRIAGE NOTES
Pt presents to the ED with bilateral foot swelling and bilateral foot pain onset Saturday. Pt reports only medication taken for pain is small dose aspirin.

## 2017-05-29 NOTE — ED NOTES
Pt. Reassessed and she states she is feeling pretty good, just having pain in the right foot. Vascular is here at this time to take the patient for the study. MD notified and will continue to monitor.

## 2017-05-29 NOTE — PROCEDURES
[facility]  *** FINAL REPORT ***    Name: Jackelin Joaquin  MRN: [id number]  : 05 Sep 1917  HIS Order #: [id number]  [address] [facility] Visit #: [id number]  Date: 29 May 2017    TYPE OF TEST: Peripheral Venous Testing    REASON FOR TEST  Limb swelling    Right Leg:-  Deep venous thrombosis:           No  Superficial venous thrombosis:    No  Deep venous insufficiency:        Not examined  Superficial venous insufficiency: Not examined    Left Leg:-  Deep venous thrombosis:           No  Superficial venous thrombosis:    No  Deep venous insufficiency:        Not examined  Superficial venous insufficiency: Not examined      INTERPRETATION/FINDINGS  Duplex images were obtained using 2-D gray scale, color flow, and  spectral Doppler analysis. Right le. No evidence of deep venous thrombosis detected in the veins  visualized. 2. Deep veins visualized include the common femoral, femoral,  popliteal, posterior tibial and peroneal veins. 3. No evidence of superficial thrombosis detected. 4. Superficial veins visualized include the proximal great saphenous  vein. Left le. No evidence of deep venous thrombosis detected in the veins  visualized. 2. Deep veins visualized include the common femoral, femoral,  popliteal, posterior tibial and peroneal veins. 3. No evidence of superficial thrombosis detected. 4. Superficial veins visualized include the proximal great saphenous  vein. ADDITIONAL COMMENTS    I have personally reviewed the data relevant to the interpretation of  this  study.     TECHNOLOGIST: Umair Arauz RVT  Signed: 2017 07:49 PM    PHYSICIAN: Enmanuel Delacruz MD  Signed: 2017 10:36 AM

## 2017-05-29 NOTE — ED PROVIDER NOTES
HPI 80 YOF here for bilateral ankle edema and mild pain to her left lower leg around the ankle and right lower leg around the ankle. She says she hasn't had this before. She says she has had this for 1 day. Patient says she does not have any chest pain, SOB, fevers, chills, back pain, abdominal pain, diaphoresis, history of DVT/PE, syncope, near syncope or weakness. She says she feels better laying down than she does standing up on the ankles. She denies any CHF history and says she sees her physicians regularly for checkups, she has had A fibb before and was on coumadin for it . No recent travel or surgeries. Past Medical History:   Diagnosis Date    Cardiac echocardiogram 2008    EF 60-65%. No RWMA. RVSP 35 mmHg. Mild AI. Mild MR. Mild TR. Mild PI.  Cardiac nuclear imaging test 2004    A-fib. No evidence of ischemia or prior infarction. EF 59%. No WMA.  Cardiovascular lower extremity venous duplex 10/05/2011    No deep vein or superficial thrombosis bilaterally. Bilateral calf veins w/calcific changes.  Diabetes ([address] 2006    Essential hypertension, benign     GERD (gastroesophageal reflux disease)     Hypercholesterolemia     Hypertension     Long term (current) use of anticoagulants 3/14/2011    Paroxysmal atrial fibrillation (HCC)     S/P total hysterectomy late        Past Surgical History:   Procedure Laterality Date    HX CATARACT REMOVAL      bilateral    HX HYSTERECTOMY  late          Family History:   Problem Relation Age of Onset    Hypertension Mother     Heart Failure Father       at age 80 from CHF    Diabetes Sister     Diabetes Brother        Social History     Social History    Marital status:      Spouse name: N/A    Number of children: N/A    Years of education: N/A     Occupational History    Not on file.      Social History Main Topics    Smoking status: Never Smoker    Smokeless tobacco: Never Used   24 [email or web address] Nas Alcohol use No    Drug use: No    Sexual activity: Not on file     Other Topics Concern    Not on file     Social History Narrative         ALLERGIES: Review of patient's allergies indicates no known allergies. Review of Systems   Constitutional: Negative. HENT: Negative. Eyes: Negative. Respiratory: Negative. Cardiovascular: Positive for leg swelling. Negative for chest pain and palpitations. Gastrointestinal: Negative. Genitourinary: Negative. Musculoskeletal: Negative. Skin: Negative. Neurological: Negative. Psychiatric/Behavioral: Negative for confusion. All other systems reviewed and are negative. Vitals:    05/29/17 1739   BP: 120/75   Pulse: 63   Resp: 16   Temp: 99.1 °F (37.3 °C)   SpO2: 97%   Weight: 57.6 kg (127 lb)   Height: 5' 3\" (1.6 m)            Physical Exam   Constitutional: She is oriented to person, place, and time. She appears well-developed and well-nourished. No distress. HENT:   Head: Normocephalic and atraumatic. Right Ear: External ear normal.   Left Ear: External ear normal.   Nose: Nose normal.   Mouth/Throat: Oropharynx is clear and moist.   Eyes: Conjunctivae and EOM are normal. Pupils are equal, round, and reactive to light. Neck: Normal range of motion. Neck supple. Cardiovascular: Normal rate, regular rhythm and intact distal pulses. Murmur (systolic murmur. ) heard. Pulmonary/Chest: Effort normal and breath sounds normal.   Abdominal: Soft. Bowel sounds are normal. She exhibits no distension. There is no tenderness. There is no guarding. Musculoskeletal: Normal range of motion. She exhibits edema (tenderness to the right medical lower leg at the anam and about 3cm above ankle medially, and posteriorly, tender lateral ankle as well. no calf edema) and tenderness (tender left ankle with general ankle edema, and mild tenderness about 3cm proximal to ankle joint. no calve swelling. ).    Lymphadenopathy:     She has no cervical adenopathy. Neurological: She is alert and oriented to person, place, and time. No cranial nerve deficit. Skin: Skin is warm and dry. No rash noted. She is not diaphoretic. No erythema. No pallor. Psychiatric: She has a normal mood and affect. Her behavior is normal.   Nursing note and vitals reviewed. MDM  Number of Diagnoses or Management Options  Pain in both lower legs:   Pedal edema:   Diagnosis management comments: Patient doing well, eating, I went over her labs and US and X ray, all good today, she is thankful and ready for home now. No emergencies today. Amount and/or Complexity of Data Reviewed  Clinical lab tests: ordered and reviewed  Tests in the radiology section of CPT®: ordered and reviewed    Risk of Complications, Morbidity, and/or Mortality  Presenting problems: low  Diagnostic procedures: low  Management options: low    Patient Progress  Patient progress: stable    ED Course       Procedures      Labs Reviewed   CBC WITH AUTOMATED DIFF - Abnormal; Notable for the following:        Result Value    HGB 11.2 (*)     MCHC 30.9 (*)     MONOCYTES 12 (*)     All other components within normal limits   METABOLIC PANEL, COMPREHENSIVE - Abnormal; Notable for the following:     BUN 29 (*)     BUN/Creatinine ratio 36 (*)     ALT (SGPT) 11 (*)     All other components within normal limits   URIC ACID   PROTHROMBIN TIME + INR   MAGNESIUM   CARDIAC PANEL,(CK, CKMB & TROPONIN)   PRO-BNP   DIGOXIN     No Known Allergies       Result Information      Status Provider Status        Final result (Exam End: 5/29/2017  6:15 PM) Open        Study Result      CHEST ONE VIEW portable 1808 hours     CPT CODE: 33244     HISTORY: Bilateral lower extremity edema.     COMPARISON: Chest x-ray July 23, 2012.     FINDINGS:      Single view obtained. The cardiac silhouette is mildly enlarged. There is stable  moderate tortuosity of the aorta with calcified plaque. The lungs are clear.   Tiny scar at the right base. Pulmonary vascularity is normal. The costophrenic  angles are sharply defined. No bony abnormalities are seen.     IMPRESSION  IMPRESSION:     Stable mild cardiomegaly. Atherosclerosis. No sign of heart failure. [facility]   PRELIMINARY REPORT      Name: Homar Salcido  MRN: [id number]  : 05 Sep 1917  HIS Order #: [id number]  [id number] [facility] Visit #: [id number]  Date: 29 May 2017     TYPE OF TEST: Peripheral Venous Testing     REASON FOR TEST  Limb swelling     Right Leg:-  Deep venous thrombosis: No  Superficial venous thrombosis: No  Deep venous insufficiency: Not examined  Superficial venous insufficiency: Not examined     Left Leg:-  Deep venous thrombosis: No  Superficial venous thrombosis: No  Deep venous insufficiency: Not examined  Superficial venous insufficiency: Not examined        INTERPRETATION/FINDINGS  Duplex images were obtained using 2-D gray scale, color flow, and  spectral Doppler analysis. Right le. No evidence of deep venous thrombosis detected in the veins  visualized. 2. Deep veins visualized include the common femoral, femoral,  popliteal, posterior tibial and peroneal veins. 3. No evidence of superficial thrombosis detected. 4. Superficial veins visualized include the proximal great saphenous  vein. Left le. No evidence of deep venous thrombosis detected in the veins  visualized. 2. Deep veins visualized include the common femoral, femoral,  popliteal, posterior tibial and peroneal veins. 3. No evidence of superficial thrombosis detected. 4. Superficial veins visualized include the proximal great saphenous  vein.     ADDITIONAL COMMENTS     I have personally reviewed the data relevant to the interpretation of  this study.     TECHNOLOGIST: Chemo Salcedo RVT  Signed: 2017 07:49 PM            ICD-10-CM ICD-9-CM   1. Pedal edema R60.0 782. 3       Plan: Discharge to home stable, see pcp in 2 days, return here for any concerns.

## 2017-05-30 LAB
ATRIAL RATE: 58 BPM
CALCULATED P AXIS, ECG09: 82 DEGREES
CALCULATED R AXIS, ECG10: 60 DEGREES
CALCULATED T AXIS, ECG11: -47 DEGREES
DIAGNOSIS, 93000: NORMAL
P-R INTERVAL, ECG05: 338 MS
Q-T INTERVAL, ECG07: 456 MS
QRS DURATION, ECG06: 132 MS
QTC CALCULATION (BEZET), ECG08: 447 MS
VENTRICULAR RATE, ECG03: 58 BPM

## 2017-05-30 NOTE — ED NOTES
I have reviewed discharge instructions with the patient and family. The patient and family verbalized understanding.

## 2017-07-05 NOTE — TELEPHONE ENCOUNTER
Requested Prescriptions     Pending Prescriptions Disp Refills    digoxin (LANOXIN) 0.125 mg tablet 90 Tab 3     Sig: Take 1 Tab by mouth daily.

## 2017-07-06 RX ORDER — DIGOXIN 125 MCG
0.12 TABLET ORAL DAILY
Qty: 90 TAB | Refills: 3 | Status: SHIPPED | OUTPATIENT
Start: 2017-07-06 | End: 2018-06-24 | Stop reason: SDUPTHER

## 2017-07-25 RX ORDER — TERAZOSIN 5 MG/1
5 CAPSULE ORAL
Qty: 90 CAP | Refills: 3 | Status: SHIPPED | OUTPATIENT
Start: 2017-07-25 | End: 2018-06-11 | Stop reason: SDUPTHER

## 2017-08-16 RX ORDER — LANSOPRAZOLE 30 MG/1
30 CAPSULE, DELAYED RELEASE ORAL
Qty: 90 CAP | Refills: 0 | Status: SHIPPED | OUTPATIENT
Start: 2017-08-16 | End: 2017-11-09 | Stop reason: SDUPTHER

## 2017-09-19 ENCOUNTER — APPOINTMENT (OUTPATIENT)
Dept: GENERAL RADIOLOGY | Age: 82
End: 2017-09-19
Attending: PHYSICIAN ASSISTANT
Payer: MEDICARE

## 2017-09-19 ENCOUNTER — HOSPITAL ENCOUNTER (EMERGENCY)
Age: 82
Discharge: HOME | End: 2017-09-19
Attending: EMERGENCY MEDICINE
Payer: MEDICARE

## 2017-09-19 VITALS
DIASTOLIC BLOOD PRESSURE: 86 MMHG | RESPIRATION RATE: 16 BRPM | OXYGEN SATURATION: 100 % | HEART RATE: 85 BPM | TEMPERATURE: 98.4 F | SYSTOLIC BLOOD PRESSURE: 142 MMHG

## 2017-09-19 DIAGNOSIS — M25.531 WRIST PAIN, RIGHT: Primary | ICD-10-CM

## 2017-09-19 PROCEDURE — 73110 X-RAY EXAM OF WRIST: CPT

## 2017-09-19 PROCEDURE — L3809 WHFO W/O JOINTS PRE OTS: HCPCS

## 2017-09-19 PROCEDURE — 99283 EMERGENCY DEPT VISIT LOW MDM: CPT

## 2017-09-19 NOTE — ED NOTES
I have reviewed discharge instructions with the patient and caregiver. The patient and caregiver verbalized understanding. Patient armband removed and given to patient to take home.   Patient was informed of the privacy risks if armband lost or stolen  Current Discharge Medication List

## 2017-09-19 NOTE — ED PROVIDER NOTES
[facility] EMERGENCY DEPT      80 y.o. female with a PMH of HTN, Hypercholesterolemia, GERD, and DM presents to the ED c/o right wrist pain and swelling since 2 days ago. Pt states she first noticed it when she went to turn the key to turn on her car. She did not have a fall. Took Tylenol 500mg at noon today with improvement of her pain. She denies any fever, chills, numbness, weakness, other pain, or other symptoms at this time. No current facility-administered medications for this encounter. Current Outpatient Prescriptions   Medication Sig    lansoprazole (PREVACID) 30 mg capsule Take 1 Cap by mouth Daily (before breakfast).  terazosin (HYTRIN) 5 mg capsule Take 1 Cap by mouth nightly.  digoxin (LANOXIN) 0.125 mg tablet Take 1 Tab by mouth daily.  acetaminophen (TYLENOL ARTHRITIS PAIN) 650 mg CR tablet Take 650 mg by mouth every six (6) hours as needed for Pain.  chlorthalidone (HYGROTEN) 25 mg tablet Take 1 Tab by mouth daily.  gemfibrozil (LOPID) 600 mg tablet Take 1 Tab by mouth daily.  ACETAMINOPHEN (TYLENOL ARTHRITIS PAIN PO) Take  by mouth.  metoprolol succinate (TOPROL-XL) 25 mg XL tablet Take 1 Tab by mouth daily.  metFORMIN (GLUCOPHAGE) 500 mg tablet Take 1 Tab by mouth two (2) times daily (with meals).  mirabegron ER (MYRBETRIQ) 25 mg ER tablet Take 1 Tab by mouth daily.  potassium chloride (K-DUR, KLOR-CON) 20 mEq tablet Take 1 Tab by mouth daily.  lisinopril (PRINIVIL, ZESTRIL) 20 mg tablet Take 1 Tab by mouth daily.  aspirin 81 mg tablet Take 1 Tab by mouth daily.  glucose blood VI test strips (ACCU-CHEK COMPACT TEST) strip by Does Not Apply route. Use as directed       Past Medical History:   Diagnosis Date    Cardiac echocardiogram 03/21/2008    EF 60-65%. No RWMA. RVSP 35 mmHg. Mild AI. Mild MR. Mild TR. Mild PI.  Cardiac nuclear imaging test 03/29/2004    A-fib. No evidence of ischemia or prior infarction. EF 59%. No WMA.  Cardiovascular lower extremity venous duplex 10/05/2011    No deep vein or superficial thrombosis bilaterally. Bilateral calf veins w/calcific changes.  Diabetes ([facility] [address] 2006    Essential hypertension, benign     GERD (gastroesophageal reflux disease)     Hypercholesterolemia     Hypertension     Long term (current) use of anticoagulants 3/14/2011    Paroxysmal atrial fibrillation (HCC)     S/P total hysterectomy late        Past Surgical History:   Procedure Laterality Date    HX CATARACT REMOVAL      bilateral    HX HYSTERECTOMY  late        Family History   Problem Relation Age of Onset    Hypertension Mother     Heart Failure Father       at age 80 from CHF    Diabetes Sister     Diabetes Brother        Social History     Social History    Marital status:      Spouse name: N/A    Number of children: N/A    Years of education: N/A     Occupational History    Not on file. Social History Main Topics    Smoking status: Never Smoker    Smokeless tobacco: Never Used    Alcohol use No    Drug use: No    Sexual activity: Not on file     Other Topics Concern    Not on file     Social History Narrative       No Known Allergies    Patient's primary care provider (as noted in EPIC):  Arthuro Cranker, MD    Constitutional:  Denies malaise, fever, chills. Extremity/MS:  + right wrist pain and swelling. Neuro:  Denies  neurologic symptoms/deficits/paresthesias. Skin: Denies injury, rash, itching or skin changes. All other systems negative as reviewed. Visit Vitals    /86    Pulse 85    Temp 98.4 °F (36.9 °C)    Resp 16    SpO2 100%       PHYSICAL EXAM:    CONSTITUTIONAL:  Alert, in no apparent distress;  well developed;  well nourished. HEAD:  Normocephalic, atraumatic. EYES:  EOMI. Non-icteric sclera. Normal conjunctiva. ENTM:  Mouth: mucous membranes moist.  NECK:  Supple  RESPIRATORY:  Chest clear, equal breath sounds, good air movement. Without wheezes, rhonchi or rales. CARDIOVASCULAR:  Regular rate and rhythm. No murmurs, rubs, or gallops. UPPER EXT:  Right wrist with diffuse edema, mild TTP overlying radial aspect of wrist into hand including anatomic snuffbox. NEURO:  Moves all four extremities, and grossly normal motor exam.  SKIN:  No rashes;  Normal for age. PSYCH:  Alert and normal affect. DIFFERENTIAL DIAGNOSES/ MEDICAL DECISION MAKING:  Contusion, sprain, dislocation, fracture, ligamentous tear/ disruption or a combination of the above. Xray right wrist:   1. No acute process. 2.  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 This can be seen in  calcium pyrophosphate deposition deficiency, among others. IMPRESSION AND MEDICAL DECISION MAKING:  Based upon the patients presentation with noted HPI and PE, along with the work up done in the emergency department, I believe that the patient is having noted wrist pain which may be secondary to arthritis. Will place patient in splint and have f/u with orthopedics. Tylenol for pain. SPLINT NOTE: 4:37 PM 9/19/2017  Splint applied as ordered by: Odette Vines RN  TYPE of Splint: Volar, velcro  Location: Right  Neurovascular intact prior to application of splint. Neurovascular intact after application of splint. Splint in acceptable position of comfort. [place] LOR Lara    Diagnosis:   1. Wrist pain, right      Disposition: Discharge    Follow-up Information     Follow up With Details Comments [facility]. In 3 days  [address]    [address] [facility] EMERGENCY DEPT  If symptoms worsen [address] [facility]  [phone]          Patient's Medications   Start Taking    No medications on file   Continue Taking    ACETAMINOPHEN (TYLENOL ARTHRITIS PAIN PO)    Take  by mouth.     ACETAMINOPHEN (TYLENOL ARTHRITIS PAIN) 650 MG CR TABLET    Take 650 mg by mouth every six (6) hours as needed for Pain. ASPIRIN 81 MG TABLET    Take 1 Tab by mouth daily. CHLORTHALIDONE (HYGROTEN) 25 MG TABLET    Take 1 Tab by mouth daily. DIGOXIN (LANOXIN) 0.125 MG TABLET    Take 1 Tab by mouth daily. GEMFIBROZIL (LOPID) 600 MG TABLET    Take 1 Tab by mouth daily. GLUCOSE BLOOD VI TEST STRIPS (ACCU-CHEK COMPACT TEST) STRIP    by Does Not Apply route. Use as directed    LANSOPRAZOLE (PREVACID) 30 MG CAPSULE    Take 1 Cap by mouth Daily (before breakfast). LISINOPRIL (PRINIVIL, ZESTRIL) 20 MG TABLET    Take 1 Tab by mouth daily. METFORMIN (GLUCOPHAGE) 500 MG TABLET    Take 1 Tab by mouth two (2) times daily (with meals). METOPROLOL SUCCINATE (TOPROL-XL) 25 MG XL TABLET    Take 1 Tab by mouth daily. MIRABEGRON ER (MYRBETRIQ) 25 MG ER TABLET    Take 1 Tab by mouth daily. POTASSIUM CHLORIDE (K-DUR, KLOR-CON) 20 MEQ TABLET    Take 1 Tab by mouth daily. TERAZOSIN (HYTRIN) 5 MG CAPSULE    Take 1 Cap by mouth nightly.    These Medications have changed    No medications on file   Stop Taking    No medications on file     LOR Lee

## 2017-09-20 ENCOUNTER — PATIENT OUTREACH (OUTPATIENT)
Dept: FAMILY MEDICINE CLINIC | Age: 82
End: 2017-09-20

## 2017-09-20 ENCOUNTER — OFFICE VISIT (OUTPATIENT)
Dept: ORTHOPEDIC SURGERY | Age: 82
End: 2017-09-20

## 2017-09-20 VITALS
TEMPERATURE: 96.9 F | DIASTOLIC BLOOD PRESSURE: 58 MMHG | BODY MASS INDEX: 23.92 KG/M2 | HEIGHT: 63 IN | WEIGHT: 135 LBS | HEART RATE: 58 BPM | SYSTOLIC BLOOD PRESSURE: 106 MMHG

## 2017-09-20 DIAGNOSIS — L03.113 RIGHT FOREARM CELLULITIS: Primary | ICD-10-CM

## 2017-09-20 RX ORDER — CEPHALEXIN 500 MG/1
500 CAPSULE ORAL 4 TIMES DAILY
Qty: 40 CAP | Refills: 0 | Status: SHIPPED | OUTPATIENT
Start: 2017-09-20 | End: 2018-04-13

## 2017-09-20 RX ORDER — SULFAMETHOXAZOLE AND TRIMETHOPRIM 800; 160 MG/1; MG/1
TABLET ORAL
Qty: 20 TAB | Refills: 0 | Status: CANCELLED | OUTPATIENT
Start: 2017-09-20

## 2017-09-20 NOTE — PROGRESS NOTES
Telephone call to jose Taylor Ron Bosworth regarding patient. Per ED report patient sustained a right wrist sprain. Has appt today at ortho at [phone]. Appointment scheduled with LILLY Rueda on 9/21/17 at [phone]. Concerned about patient not being able to do anything. Will see if qualifies for home health. If not will recommended [facility] [address] for assistance. Patient currently wearing a brace and took Tylenol 500 mg at 100 for pain relief.

## 2017-09-20 NOTE — PROGRESS NOTES
AMBULATORY PROGRESS NOTE      Patient: Umair Domingo             MRN: [id number]     SSN: [id number] Body mass index is 23.91 kg/(m^2). YOB: 1917     AGE: 80 y.o. EX: female    PCP: Partha Love MD    IMPRESSION/DIAGNOSIS AND TREATMENT PLAN     DIAGNOSES  1. Cellulitis of wrist        Orders Placed This Encounter    cephALEXin (KEFLEX) 500 mg capsule      Arlander Fothergill Epps understands her diagnoses and the proposed plan. My impression is that she has inflammation of her wrists or cellulitis to the dorsal part of her right wrist.  There is no history of fevers, shakes, chills, night sweats, and no history of trauma. She has no history of gout, or kidney, or renal issues. Her x-rays in the emergency room at [place] dated 2017, did reveal some degenerative changes to the right radiocarpal region and what looks like chondrocalcinosis. She denies any known history of inflammatory arthropathy such as gout, rheumatoid arthritis, psoriasis, or lupus. I do not think she has a septic wrist.  She has redness to the dorsal part of her wrist.  She can flex and extend her fingers, but she has some mild discomfort when she does so. She has some tenderness to the extensor tendons just distal to the proximal one-third of her right dorsal hand, no fluctuance. I demarcated this area of her forearm with a marker. My plan is listed as below. Should her symptoms worsen, of course we will have her go to the emergency room. This will be for blood work, CBC with differential, ESR, CRP, and uric acid level. The patient is accompanied by her daughter. The family and patient understands the plan of action as listed below.        Plan:    1) Continue using wrist guard provided  2)  Marked area of redness and inflammation (purple surgical type marker for demarcation)  3)  Keflex 500 m PO QID; 40 tablets, 0 refills (10 days)    RTO - 1 week    HPI AND EXAMINATION Hiren Zuleta IS A 80 y.o. female who presents to my outpatient office complaining of hand and wrist pain. The patient notes that she may have injured her right wrist by trying to get out of a chair. The patient reports that she has problems with constipation. She states that she is doing well otherwise. Treatment options have been discussed with the patient and her daughter to ensure that they are aware of the inflammation in case it worsens. Ms. Guy Tobias denies having any fever, chills, shakes, or night sweats. The patient's daughter stated that Ms. Zuleta will follow up with her PCP tomorrow. Patient denies h/o of gout. The patient's daughter states that Ms. Zuleta does not have any kidney, lungs, or hearts disorders. Cardiovascular/Peripheral Vascular: Normal Pulses to each hand and foot  Musculoskeletal:  LOCATION:   right hand    HAND, WRIST, FOREARM:  right    Integumentary: No rashes, skin patches, wounds, blisters, or abrasions    Warm and normal color. No regions of expressible drainage   Redness and warmth to the right wrist       Deformities:  Is not present       Swelling: Regions of abnormal swelling : Right Wrist        Tenderness: There is regions of tenderness : Along the right wrist that can extend into the forearm. Motor/Strength/Tone Exam: normal 5/5 strength in all tested muscle groups       Sensory Exam:  no sensory deficits noted       Stability Testing: NONE       ROM: full range of motion        Contractures:  None to affected region         Vascular : Normal capillary refill and digital coloration   No embolic phenomena to the toes or hands   Edema is not present         Neuro Exam:  Sensation : no focal            Motor function: WNL    CHART REVIEW     Past Medical History:   Diagnosis Date    Cardiac echocardiogram 03/21/2008    EF 60-65%. No RWMA. RVSP 35 mmHg. Mild AI. Mild MR. Mild TR. Mild PI.  Cardiac nuclear imaging test 03/29/2004    A-fib.   No evidence of ischemia or prior infarction. EF 59%. No WMA.  Cardiovascular lower extremity venous duplex 10/05/2011    No deep vein or superficial thrombosis bilaterally. Bilateral calf veins w/calcific changes.  Diabetes ([address] 04/2006    Essential hypertension, benign     GERD (gastroesophageal reflux disease)     Hypercholesterolemia     Hypertension     Long term (current) use of anticoagulants 3/14/2011    Paroxysmal atrial fibrillation (HCC)     S/P total hysterectomy late 1960's     Current Outpatient Prescriptions   Medication Sig    cephALEXin (KEFLEX) 500 mg capsule Take 1 Cap by mouth four (4) times daily.  lansoprazole (PREVACID) 30 mg capsule Take 1 Cap by mouth Daily (before breakfast).  terazosin (HYTRIN) 5 mg capsule Take 1 Cap by mouth nightly.  digoxin (LANOXIN) 0.125 mg tablet Take 1 Tab by mouth daily.  acetaminophen (TYLENOL ARTHRITIS PAIN) 650 mg CR tablet Take 650 mg by mouth every six (6) hours as needed for Pain.  chlorthalidone (HYGROTEN) 25 mg tablet Take 1 Tab by mouth daily.  gemfibrozil (LOPID) 600 mg tablet Take 1 Tab by mouth daily.  ACETAMINOPHEN (TYLENOL ARTHRITIS PAIN PO) Take  by mouth.  metoprolol succinate (TOPROL-XL) 25 mg XL tablet Take 1 Tab by mouth daily.  metFORMIN (GLUCOPHAGE) 500 mg tablet Take 1 Tab by mouth two (2) times daily (with meals).  mirabegron ER (MYRBETRIQ) 25 mg ER tablet Take 1 Tab by mouth daily.  potassium chloride (K-DUR, KLOR-CON) 20 mEq tablet Take 1 Tab by mouth daily.  lisinopril (PRINIVIL, ZESTRIL) 20 mg tablet Take 1 Tab by mouth daily.  aspirin 81 mg tablet Take 1 Tab by mouth daily.  glucose blood VI test strips (ACCU-CHEK COMPACT TEST) strip by Does Not Apply route. Use as directed     No current facility-administered medications for this visit.       No Known Allergies  Past Surgical History:   Procedure Laterality Date    HX CATARACT REMOVAL      bilateral    HX HYSTERECTOMY  late 1960's Social History     Occupational History    Not on file. Social History Main Topics    Smoking status: Never Smoker    Smokeless tobacco: Never Used    Alcohol use No    Drug use: No    Sexual activity: Not on file     Family History   Problem Relation Age of Onset    Hypertension Mother     Heart Failure Father       at age 80 from CHF    Diabetes Sister     Diabetes Brother        REVIEW OF SYSTEMS : 2017  ALL BELOW ARE Negative except : SEE HPI       Constitutional: Negative for fever, chills and weight loss. Neg Weigh Loss  Cardiovascular: Negative for chest pain, claudication and leg swelling. SOB, CHAPA   Gastrointestinal: Negative for  pain, N/V/D/C, Blood in stool or urine,dysuria, hematuria,        Incontinence, pelvic pain  Musculoskeletal: see HPI. Neurological: Negative for dizziness and weakness. Negative for headaches,Visual Changes, Confusion, Seizures,   Psychiatric/Behavioral: Negative for depression, memory loss and substance abuse. Extremities:  Negative for  hair changes, rash or skin lesion changes. Hematologic: Negative for Bleeding problems, bruising, pallor or swollen lymph nodes. Peripheral Vascular: No calf pain, vascular vein tenderness to calf pain              No calf throbbing, posterior knee throbbing pain    DIAGNOSTIC IMAGING     No notes on file     XR Results (most recent):    Results from Hospital Encounter encounter on 17   XR WRIST RT AP/LAT/OBL MIN 3V   Narrative XR WRIST RT AP/LAT/OBL MIN 3V    Indication: Pain    Comparison: None    Findings:  No acute fracture or dislocation. Osseous structures are aligned anatomically. Vascular calcifications. Degenerative changes are seen in the radiocarpal joint. Calcification is seen in the cartilage of the distal radial ulnar and ulnar  carpal joints. Chondrocalcinosis is also seen in the MCP joints. Impression Impression:  1. No acute process.   2.  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 This can be seen in  calcium pyrophosphate deposition deficiency, among others. Written by Florence Florez, as dictated by Sayda Mena MD. I, , Sayda Mena MD, confirm that all documentation is accurate.

## 2017-09-20 NOTE — MR AVS SNAPSHOT
Visit Information Date & Time Provider Department Dept. Phone Encounter #  
 9/20/2017  2:20 PM Daria Jean-Baptiste, [address] [facility] [phone] [id number] Your Appointments 9/21/2017 12:45 PM  
ROUTINE CARE with Annie Anderson  [address] ([address]) Appt Note: f/u ED right wrist sprain [address] [address] [address] [place] [address] [address]  
  
    
 11/13/2017  1:40 PM  
Follow Up with Arturo Garcia MD  
[facility] ([facility]) Appt Note: 6 month f/up; mailed ltr; Rescheduling Appointment From 08/15/2017; mailed ltr; 6 month f/up/r/s'd with the patient-SHAZIA Castro Getting [id number] [phone] [address] [place] [address] [address] Upcoming Health Maintenance Date Due  
 EYE EXAM RETINAL OR DILATED Q1 9/5/1927 GLAUCOMA SCREENING Q2Y 1/1/2017 INFLUENZA AGE 9 TO ADULT 8/1/2017 HEMOGLOBIN A1C Q6M 8/14/2017 Pneumococcal 65+ Low/Medium Risk (2 of 2 - PPSV23) 8/16/2017 MEDICARE YEARLY EXAM 8/17/2017 MICROALBUMIN Q1 2/14/2018 LIPID PANEL Q1 2/14/2018 FOOT EXAM Q1 3/6/2018 DTaP/Tdap/Td series (2 - Td) 8/16/2026 Allergies as of 9/20/2017  Review Complete On: 9/20/2017 By: Daria Jean-Baptiste MD  
 No Known Allergies Current Immunizations  Never Reviewed No immunizations on file. Not reviewed this visit You Were Diagnosed With   
  
 Codes Comments Cellulitis of wrist    -  Primary ICD-10-CM: A54.476 ICD-9-CM: 950. 4 Vitals BP Pulse Temp Height(growth percentile) Weight(growth percentile) BMI  
 106/58 (!) 58 96.9 °F (36.1 °C) (Oral) 5' 3\" (1.6 m) 135 lb (61.2 kg) 23.91 kg/m2 OB Status Smoking Status Hysterectomy Never Smoker BMI and BSA Data Body Mass Index Body Surface Area  
 23.91 kg/m 2 1.65 m 2 Preferred Pharmacy Pharmacy Name Phone [facility]ARMACY #[id number] Kathleen Shannon, [address] [facility] [facility] [phone] Your Updated Medication List  
  
   
This list is accurate as of: 9/20/17  2:32 PM.  Always use your most recent med list.  
  
  
  
  
 aspirin 81 mg tablet Take 1 Tab by mouth daily. cephALEXin 500 mg capsule Commonly known as:  Candy Yousuf Take 1 Cap by mouth four (4) times daily. chlorthalidone 25 mg tablet Commonly known as:  Hermina Britain Take 1 Tab by mouth daily. digoxin 0.125 mg tablet Commonly known as:  LANOXIN Take 1 Tab by mouth daily. gemfibrozil 600 mg tablet Commonly known as:  LOPID Take 1 Tab by mouth daily. glucose blood VI test strips strip Commonly known as:  ACCU-CHEK COMPACT TEST  
by Does Not Apply route. Use as directed  
  
 lansoprazole 30 mg capsule Commonly known as:  PREVACID Take 1 Cap by mouth Daily (before breakfast). lisinopril 20 mg tablet Commonly known as:  Gable Hails Take 1 Tab by mouth daily. metFORMIN 500 mg tablet Commonly known as:  GLUCOPHAGE Take 1 Tab by mouth two (2) times daily (with meals). metoprolol succinate 25 mg XL tablet Commonly known as:  TOPROL-XL Take 1 Tab by mouth daily. mirabegron ER 25 mg ER tablet Commonly known as:  MYRBETRIQ Take 1 Tab by mouth daily. potassium chloride 20 mEq tablet Commonly known as:  K-DUR, KLOR-CON Take 1 Tab by mouth daily. terazosin 5 mg capsule Commonly known as:  HYTRIN Take 1 Cap by mouth nightly. * TYLENOL ARTHRITIS PAIN PO Take  by mouth. * TYLENOL ARTHRITIS PAIN 650 mg CR tablet Generic drug:  acetaminophen Take 650 mg by mouth every six (6) hours as needed for Pain. * Notice:   This list has 2 medication(s) that are the same as other medications prescribed for you. Read the directions carefully, and ask your doctor or other care provider to review them with you. Prescriptions Sent to Pharmacy Refills  
 cephALEXin (KEFLEX) 500 mg capsule 0 Sig: Take 1 Cap by mouth four (4) times daily. Class: Normal  
 Pharmacy: [facility]/pharmacy [address] [place] [address], [facility],[address] [facility] #: [phone] Route: Oral  
  
Introducing [facility]! Ernesto Coreyevita introduces [employer] patient portal. Now you can access parts of your medical record, email your doctor's office, and request medication refills online. 1. In your internet browser, go to [email or web address] 2. Click on the First Time User? Click Here link in the Sign In box. You will see the New Member Sign Up page. 3. Enter your [employer]i Access Code exactly as it appears below. You will not need to use this code after youve completed the sign-up process. If you do not sign up before the expiration date, you must request a new code. · [employer] Access Code: [email or web address] Expires: 11/26/2017 10:01 AM 
 
4. Enter the last four digits of your Social Security Number (xxxx) and Date of Birth (mm/dd/yyyy) as indicated and click Submit. You will be taken to the next sign-up page. 5. Create a Naroomi ID. This will be your [employer]i login ID and cannot be changed, so think of one that is secure and easy to remember. 6. Create a [employer] password. You can change your password at any time. 7. Enter your Password Reset Question and Answer. This can be used at a later time if you forget your password. 8. Enter your e-mail address. You will receive e-mail notification when new information is available in [address] 9. Click Sign Up. You can now view and download portions of your medical record. 10. Click the Download Summary menu link to download a portable copy of your medical information. If you have questions, please visit the Frequently Asked Questions section of the [employer] website. Remember, [employer] is NOT to be used for urgent needs. For medical emergencies, dial 911. Now available from your iPhone and Android! Please provide this summary of care documentation to your next provider. Your primary care clinician is listed as [address]. If you have any questions after today's visit, please call [phone].

## 2017-09-20 NOTE — PROGRESS NOTES
Attempted to contact patient regarding ED visit yesterday for right wrist pain and swelling. Unable to leave voicemail. Message stating, \"Memory is full. Enter the remote access. \"

## 2017-09-21 ENCOUNTER — OFFICE VISIT (OUTPATIENT)
Dept: FAMILY MEDICINE CLINIC | Age: 82
End: 2017-09-21

## 2017-09-21 VITALS
HEART RATE: 59 BPM | SYSTOLIC BLOOD PRESSURE: 120 MMHG | BODY MASS INDEX: 23.57 KG/M2 | RESPIRATION RATE: 16 BRPM | OXYGEN SATURATION: 97 % | WEIGHT: 133 LBS | TEMPERATURE: 97.8 F | HEIGHT: 63 IN | DIASTOLIC BLOOD PRESSURE: 58 MMHG

## 2017-09-21 DIAGNOSIS — S63.501A WRIST SPRAIN, RIGHT, INITIAL ENCOUNTER: Primary | ICD-10-CM

## 2017-09-21 RX ORDER — ACETAMINOPHEN AND CODEINE PHOSPHATE 300; 30 MG/1; MG/1
1 TABLET ORAL
Qty: 20 TAB | Refills: 0 | Status: SHIPPED | OUTPATIENT
Start: 2017-09-21 | End: 2017-10-10 | Stop reason: SDUPTHER

## 2017-09-21 RX ORDER — IBUPROFEN 400 MG/1
400 TABLET ORAL
Qty: 30 TAB | Refills: 0 | Status: SHIPPED | OUTPATIENT
Start: 2017-09-21 | End: 2017-09-21 | Stop reason: SDUPTHER

## 2017-09-21 RX ORDER — IBUPROFEN 400 MG/1
400 TABLET ORAL
Qty: 30 TAB | Refills: 0 | Status: SHIPPED | OUTPATIENT
Start: 2017-09-21 | End: 2018-04-13

## 2017-09-21 NOTE — MR AVS SNAPSHOT
Visit Information Date & Time Provider Department Dept. Phone Encounter #  
 9/21/2017 12:45 PM Zafar Wild  [facility] [phone] Follow-up Instructions Return if symptoms worsen or fail to improve. Your Appointments 9/26/2017  1:20 PM  
Follow Up with Lorenzo Adrian MD  
[address] [facility], [address] and [facility] [place] [facility] ([facility]) Appt Note: rt wrist 1 wk fu  
 [address], [facility] [address] [place] [address]  
[phone] [address] [facility]  
  
    
 11/13/2017  1:40 PM  
Follow Up with Abelino Young MD  
[facility] ([facility]) Appt Note: 6 month f/up; mailed ltr; Rescheduling Appointment From 08/15/2017; mailed ltr; 6 month f/up/r/s'd with the patient-SHAZIA  
 [facility] [address] [address] [address] [phone] [address] [place] [address] [address] Upcoming Health Maintenance Date Due  
 EYE EXAM RETINAL OR DILATED Q1 9/5/1927 GLAUCOMA SCREENING Q2Y 1/1/2017 INFLUENZA AGE 9 TO ADULT 8/1/2017 HEMOGLOBIN A1C Q6M 8/14/2017 MEDICARE YEARLY EXAM 8/17/2017 MICROALBUMIN Q1 2/14/2018 LIPID PANEL Q1 2/14/2018 FOOT EXAM Q1 3/6/2018 DTaP/Tdap/Td series (2 - Td) 8/16/2026 Allergies as of 9/21/2017  Review Complete On: 9/21/2017 By: Zafar Wild NP No Known Allergies Current Immunizations  Never Reviewed No immunizations on file. Not reviewed this visit You Were Diagnosed With   
  
 Codes Comments Wrist sprain, right, initial encounter    -  Primary ICD-10-CM: R58.141F ICD-9-CM: 842.00 Vitals BP Pulse Temp Resp Height(growth percentile) Weight(growth percentile) 120/58 (BP 1 Location: Left arm, BP Patient Position: Sitting) (!) 59 97.8 °F (36.6 °C) (Oral) 16 5' 3\" (1.6 m) 133 lb (60.3 kg) SpO2 BMI OB Status Smoking Status 97% 23.56 kg/m2 Hysterectomy Never Smoker BMI and BSA Data Body Mass Index Body Surface Area  
 23.56 kg/m 2 1.64 m 2 Preferred Pharmacy Pharmacy Name Phone  [address] [phone] Your Updated Medication List  
  
   
This list is accurate as of: 9/21/17  1:08 PM.  Always use your most recent med list.  
  
  
  
  
 acetaminophen-codeine 300-30 mg per tablet Commonly known as:  TYLENOL-CODEINE #3 Take 1 Tab by mouth every four (4) hours as needed for Pain. Max Daily Amount: 6 Tabs. aspirin 81 mg tablet Take 1 Tab by mouth daily. cephALEXin 500 mg capsule Commonly known as:  Vanita Platts Take 1 Cap by mouth four (4) times daily. chlorthalidone 25 mg tablet Commonly known as:  Jyoti Gary Take 1 Tab by mouth daily. digoxin 0.125 mg tablet Commonly known as:  LANOXIN Take 1 Tab by mouth daily. gemfibrozil 600 mg tablet Commonly known as:  LOPID Take 1 Tab by mouth daily. glucose blood VI test strips strip Commonly known as:  ACCU-CHEK COMPACT TEST  
by Does Not Apply route. Use as directed  
  
 ibuprofen 400 mg tablet Commonly known as:  MOTRIN Take 1 Tab by mouth every six (6) hours as needed for Pain.  
  
 lansoprazole 30 mg capsule Commonly known as:  PREVACID Take 1 Cap by mouth Daily (before breakfast). lisinopril 20 mg tablet Commonly known as:  [place] Dawood Take 1 Tab by mouth daily. metFORMIN 500 mg tablet Commonly known as:  GLUCOPHAGE Take 1 Tab by mouth two (2) times daily (with meals). metoprolol succinate 25 mg XL tablet Commonly known as:  TOPROL-XL Take 1 Tab by mouth daily. mirabegron ER 25 mg ER tablet Commonly known as:  MYRBETRIQ Take 1 Tab by mouth daily. potassium chloride 20 mEq tablet Commonly known as:  K-DUR, KLOR-CON Take 1 Tab by mouth daily. terazosin 5 mg capsule Commonly known as:  HYTRIN Take 1 Cap by mouth nightly. TYLENOL ARTHRITIS PAIN PO Take  by mouth. Prescriptions Printed Refills  
 acetaminophen-codeine (TYLENOL-CODEINE #3) 300-30 mg per tablet 0 Sig: Take 1 Tab by mouth every four (4) hours as needed for Pain. Max Daily Amount: 6 Tabs. Class: Print Route: Oral  
  
Prescriptions Sent to Pharmacy Refills  
 ibuprofen (MOTRIN) 400 mg tablet 0 Sig: Take 1 Tab by mouth every six (6) hours as needed for Pain. Class: Normal  
 Pharmacy: [place] [address]  #: [phone] Route: Oral  
  
Follow-up Instructions Return if symptoms worsen or fail to improve. Patient Instructions May alternate the Motrin and plain tylenol . Take with food. Use the tylenol #3 at night. Pt is staying with her sister. Introducing [facility]! [facility] introduces [employer] patient portal. Now you can access parts of your medical record, email your doctor's office, and request medication refills online. 1. In your internet browser, go to [email or web address] 2. Click on the First Time User? Click Here link in the Sign In box. You will see the New Member Sign Up page. 3. Enter your [employer] Access Code exactly as it appears below. You will not need to use this code after youve completed the sign-up process. If you do not sign up before the expiration date, you must request a new code. · [employer] Access Code: [email or web address] Expires: 11/26/2017 10:01 AM 
 
4. Enter the last four digits of your Social Security Number (xxxx) and Date of Birth (mm/dd/yyyy) as indicated and click Submit. You will be taken to the next sign-up page. 5. Create a WomStreett ID. This will be your [employer] login ID and cannot be changed, so think of one that is secure and easy to remember. 6. Create a Wom[employer] password. You can change your password at any time. 7. Enter your Password Reset Question and Answer. This can be used at a later time if you forget your password. 8. Enter your e-mail address. You will receive e-mail notification when new information is available in [address] 9. Click Sign Up. You can now view and download portions of your medical record. 10. Click the Download Summary menu link to download a portable copy of your medical information. If you have questions, please visit the Frequently Asked Questions section of the [employer]Best[employer] website. Remember, [employer] is NOT to be used for urgent needs. For medical emergencies, dial 911. Now available from your iPhone and Android! Please provide this summary of care documentation to your next provider. Your primary care clinician is listed as [address]. If you have any questions after today's visit, please call [phone].

## 2017-09-21 NOTE — PROGRESS NOTES
1. Have you been to the ER, urgent care clinic since your last visit? Hospitalized since your last visit? Yes When: vinita 09/19/2017     2. Have you seen or consulted any other health care providers outside of the [address] [place] [address] since your last visit? Include any pap smears or colon screening.  No

## 2017-09-21 NOTE — PROGRESS NOTES
HISTORY OF PRESENT ILLNESS  Sophia Stiles is a 80 y.o. female. Patient presents today with her sister after a ED visit on 9-19-17 from a fall. HPI  She injured her right wrist. Tylenol helps some but the pain is worse at night. It is hard to get comfortable. She is wearing a wrist splint. She was given Keflex. Review of Systems   Constitutional: Negative. Respiratory: Negative. Cardiovascular: Negative. Musculoskeletal: Positive for joint pain (right wrist pain.). Visit Vitals    /58 (BP 1 Location: Left arm, BP Patient Position: Sitting)    Pulse (!) 59    Temp 97.8 °F (36.6 °C) (Oral)    Resp 16    Ht 5' 3\" (1.6 m)    Wt 133 lb (60.3 kg)    SpO2 97%    BMI 23.56 kg/m2       Physical Exam   Constitutional: She appears well-developed. No distress. Cardiovascular: Normal rate. No murmur heard. Pulmonary/Chest: Effort normal and breath sounds normal. No respiratory distress. She has no wheezes. She has no rales. Musculoskeletal:        Right wrist: She exhibits decreased range of motion and tenderness. She exhibits no swelling, no effusion and no crepitus. ASSESSMENT and PLAN    ICD-10-CM ICD-9-CM    1. Wrist sprain, right, initial encounter S63.501A 842.00 acetaminophen-codeine (TYLENOL-CODEINE #3) 300-30 mg per tablet      ibuprofen (MOTRIN) 400 mg tablet      DISCONTINUED: ibuprofen (MOTRIN) 400 mg tablet     PLAN:  I reviewed the ED notes and x-ray. Pt is to continue the Keflex as prescribed. During the day she can alternate Motrin and tylenol and I explained how and recommended that she takes this with food. Pt is staying with her sister at night. I gave her tylenol #3 that she can take at night. The concern that she can be drowsy from medication. Pt sometimes gets up a couple of times during the night. Pt given after visit summary.

## 2017-09-21 NOTE — PATIENT INSTRUCTIONS
May alternate the Motrin and plain tylenol . Take with food. Use the tylenol #3 at night. Pt is staying with her sister.

## 2017-09-26 ENCOUNTER — OFFICE VISIT (OUTPATIENT)
Dept: ORTHOPEDIC SURGERY | Age: 82
End: 2017-09-26

## 2017-09-26 VITALS
TEMPERATURE: 98 F | DIASTOLIC BLOOD PRESSURE: 59 MMHG | HEART RATE: 57 BPM | HEIGHT: 63 IN | RESPIRATION RATE: 16 BRPM | SYSTOLIC BLOOD PRESSURE: 122 MMHG | WEIGHT: 137.2 LBS | OXYGEN SATURATION: 96 % | BODY MASS INDEX: 24.31 KG/M2

## 2017-09-26 DIAGNOSIS — L03.113 RIGHT FOREARM CELLULITIS: Primary | ICD-10-CM

## 2017-09-26 NOTE — MR AVS SNAPSHOT
Visit Information Date & Time Provider Department Dept. Phone Encounter #  
 9/26/2017  1:20 PM Van Flores MD [facility]  Your Appointments 11/13/2017  1:40 PM  
Follow Up with Winter Kilpatrick MD  
[facility] ([facility]) Appt Note: 6 month f/up; mailed ltr; Rescheduling Appointment From 08/15/2017; mailed ltr; 6 month f/up/r/s'd with the patient-SHAZIA [place] [phone] [address] [place] [address] Upcoming Health Maintenance Date Due  
 EYE EXAM RETINAL OR DILATED Q1 9/5/1927 GLAUCOMA SCREENING Q2Y 1/1/2017 INFLUENZA AGE 9 TO ADULT 8/1/2017 HEMOGLOBIN A1C Q6M 8/14/2017 MEDICARE YEARLY EXAM 8/17/2017 MICROALBUMIN Q1 2/14/2018 LIPID PANEL Q1 2/14/2018 FOOT EXAM Q1 3/6/2018 DTaP/Tdap/Td series (2 - Td) 8/16/2026 Allergies as of 9/26/2017  Review Complete On: 9/26/2017 By: Van Flores MD  
 No Known Allergies Current Immunizations  Never Reviewed No immunizations on file. Not reviewed this visit You Were Diagnosed With   
  
 Codes Comments Right forearm cellulitis    -  Primary ICD-10-CM: Q53.154 ICD-9-CM: 189. 3 Vitals BP Pulse Temp Resp Height(growth percentile) Weight(growth percentile) 122/59 (BP 1 Location: Left arm, BP Patient Position: Sitting) (!) 57 98 °F (36.7 °C) (Oral) 16 5' 3\" (1.6 m) 137 lb 3.2 oz (62.2 kg) SpO2 BMI OB Status Smoking Status 96% 24.3 kg/m2 Hysterectomy Never Smoker BMI and BSA Data Body Mass Index Body Surface Area  
 24.3 kg/m 2 1.66 m 2 Preferred Pharmacy Pharmacy Name Phone [id number], [facility] [facility] [phone] Your Updated Medication List  
  
   
 This list is accurate as of: 9/26/17  1:21 PM.  Always use your most recent med list.  
  
  
  
  
 acetaminophen-codeine 300-30 mg per tablet Commonly known as:  TYLENOL-CODEINE #3 Take 1 Tab by mouth every four (4) hours as needed for Pain. Max Daily Amount: 6 Tabs. aspirin 81 mg tablet Take 1 Tab by mouth daily. cephALEXin 500 mg capsule Commonly known as:  Beena Blank Take 1 Cap by mouth four (4) times daily. chlorthalidone 25 mg tablet Commonly known as:  Ivania Presto Take 1 Tab by mouth daily. digoxin 0.125 mg tablet Commonly known as:  LANOXIN Take 1 Tab by mouth daily. gemfibrozil 600 mg tablet Commonly known as:  LOPID Take 1 Tab by mouth daily. glucose blood VI test strips strip Commonly known as:  ACCU-CHEK COMPACT TEST  
by Does Not Apply route. Use as directed  
  
 ibuprofen 400 mg tablet Commonly known as:  MOTRIN Take 1 Tab by mouth every six (6) hours as needed for Pain.  
  
 lansoprazole 30 mg capsule Commonly known as:  PREVACID Take 1 Cap by mouth Daily (before breakfast). lisinopril 20 mg tablet Commonly known as:  Asiya Drought Take 1 Tab by mouth daily. metFORMIN 500 mg tablet Commonly known as:  GLUCOPHAGE Take 1 Tab by mouth two (2) times daily (with meals). metoprolol succinate 25 mg XL tablet Commonly known as:  TOPROL-XL Take 1 Tab by mouth daily. mirabegron ER 25 mg ER tablet Commonly known as:  MYRBETRIQ Take 1 Tab by mouth daily. potassium chloride 20 mEq tablet Commonly known as:  K-DUR, KLOR-CON Take 1 Tab by mouth daily. terazosin 5 mg capsule Commonly known as:  HYTRIN Take 1 Cap by mouth nightly. TYLENOL ARTHRITIS PAIN PO Take  by mouth. Patient Instructions Please follow up in 2 weeks. You are advised to contact us if your condition worsens. Cellulitis: Care Instructions Your Care Instructions Cellulitis is a skin infection. It often occurs after a break in the skin from a scrape, cut, bite, or puncture, or after a rash. The doctor has checked you carefully, but problems can develop later. If you notice any problems or new symptoms, get medical treatment right away. Follow-up care is a key part of your treatment and safety. Be sure to make and go to all appointments, and call your doctor if you are having problems. It's also a good idea to know your test results and keep a list of the medicines you take. How can you care for yourself at home? · Take your antibiotics as directed. Do not stop taking them just because you feel better. You need to take the full course of antibiotics. · Prop up the infected area on pillows to reduce pain and swelling. Try to keep the area above the level of your heart as often as you can. · If your doctor told you how to care for your wound, follow your doctor's instructions. If you did not get instructions, follow this general advice: ¨ Wash the wound with clean water 2 times a day. Don't use hydrogen peroxide or alcohol, which can slow healing. ¨ You may cover the wound with a thin layer of petroleum jelly, such as Vaseline, and a nonstick bandage. ¨ Apply more petroleum jelly and replace the bandage as needed. · Be safe with medicines. Take pain medicines exactly as directed. ¨ If the doctor gave you a prescription medicine for pain, take it as prescribed. ¨ If you are not taking a prescription pain medicine, ask your doctor if you can take an over-the-counter medicine. To prevent cellulitis in the future · Try to prevent cuts, scrapes, or other injuries to your skin. Cellulitis most often occurs where there is a break in the skin. · If you get a scrape, cut, mild burn, or bite, wash the wound with clean water as soon as you can to help avoid infection. Don't use hydrogen peroxide or alcohol, which can slow healing. · If you have swelling in your legs (edema), support stockings and good skin care may help prevent leg sores and cellulitis. · Take care of your feet, especially if you have diabetes or other conditions that increase the risk of infection. Wear shoes and socks. Do not go barefoot. If you have athlete's foot or other skin problems on your feet, talk to your doctor about how to treat them. When should you call for help? Call your doctor now or seek immediate medical care if: 
· You have signs that your infection is getting worse, such as: 
¨ Increased pain, swelling, warmth, or redness. ¨ Red streaks leading from the area. ¨ Pus draining from the area. ¨ A fever. · You get a rash. Watch closely for changes in your health, and be sure to contact your doctor if: 
· You are not getting better after 1 day (24 hours). · You do not get better as expected. Where can you learn more? Go to http://jujuDigital Assent.info/. Roque Magana in the search box to learn more about \"Cellulitis: Care Instructions. \" Current as of: October 13, 2016 Content Version: 11.3 © 5992-2237 [employer]. Care instructions adapted under license by [employer] (which disclaims liability or warranty for this information). If you have questions about a medical condition or this instruction, always ask your healthcare professional. [address] any warranty or liability for your use of this information. Introducing [facility]! Mulugeta Shrestha introduces [employer] patient portal. Now you can access parts of your medical record, email your doctor's office, and request medication refills online. 1. In your internet browser, go to [email or web address] 2. Click on the First Time User? Click Here link in the Sign In box. You will see the New Member Sign Up page. 3. Enter your [employer] Access Code exactly as it appears below.  You will not need to use this code after youve completed the sign-up process. If you do not sign up before the expiration date, you must request a new code. · [employer] Access Code: [email or web address] Expires: 11/26/2017 10:01 AM 
 
4. Enter the last four digits of your Social Security Number (xxxx) and Date of Birth (mm/dd/yyyy) as indicated and click Submit. You will be taken to the next sign-up page. 5. Create a [employer] ID. This will be your [employer] login ID and cannot be changed, so think of one that is secure and easy to remember. 6. Create a [employer] password. You can change your password at any time. 7. Enter your Password Reset Question and Answer. This can be used at a later time if you forget your password. 8. Enter your e-mail address. You will receive e-mail notification when new information is available in [address] 9. Click Sign Up. You can now view and download portions of your medical record. 10. Click the Download Summary menu link to download a portable copy of your medical information. If you have questions, please visit the Frequently Asked Questions section of the [employer] website. Remember, [employer] is NOT to be used for urgent needs. For medical emergencies, dial 911. Now available from your iPhone and Android! Please provide this summary of care documentation to your next provider. Your primary care clinician is listed as Codey Ruby. If you have any questions after today's visit, please call [phone].

## 2017-09-26 NOTE — PATIENT INSTRUCTIONS
Please follow up in 2 weeks. You are advised to contact us if your condition worsens. Cellulitis: Care Instructions  Your Care Instructions    Cellulitis is a skin infection. It often occurs after a break in the skin from a scrape, cut, bite, or puncture, or after a rash. The doctor has checked you carefully, but problems can develop later. If you notice any problems or new symptoms, get medical treatment right away. Follow-up care is a key part of your treatment and safety. Be sure to make and go to all appointments, and call your doctor if you are having problems. It's also a good idea to know your test results and keep a list of the medicines you take. How can you care for yourself at home? · Take your antibiotics as directed. Do not stop taking them just because you feel better. You need to take the full course of antibiotics. · Prop up the infected area on pillows to reduce pain and swelling. Try to keep the area above the level of your heart as often as you can. · If your doctor told you how to care for your wound, follow your doctor's instructions. If you did not get instructions, follow this general advice:  ¨ Wash the wound with clean water 2 times a day. Don't use hydrogen peroxide or alcohol, which can slow healing. ¨ You may cover the wound with a thin layer of petroleum jelly, such as Vaseline, and a nonstick bandage. ¨ Apply more petroleum jelly and replace the bandage as needed. · Be safe with medicines. Take pain medicines exactly as directed. ¨ If the doctor gave you a prescription medicine for pain, take it as prescribed. ¨ If you are not taking a prescription pain medicine, ask your doctor if you can take an over-the-counter medicine. To prevent cellulitis in the future  · Try to prevent cuts, scrapes, or other injuries to your skin. Cellulitis most often occurs where there is a break in the skin.   · If you get a scrape, cut, mild burn, or bite, wash the wound with clean water as soon as you can to help avoid infection. Don't use hydrogen peroxide or alcohol, which can slow healing. · If you have swelling in your legs (edema), support stockings and good skin care may help prevent leg sores and cellulitis. · Take care of your feet, especially if you have diabetes or other conditions that increase the risk of infection. Wear shoes and socks. Do not go barefoot. If you have athlete's foot or other skin problems on your feet, talk to your doctor about how to treat them. When should you call for help? Call your doctor now or seek immediate medical care if:  · You have signs that your infection is getting worse, such as:  ¨ Increased pain, swelling, warmth, or redness. ¨ Red streaks leading from the area. ¨ Pus draining from the area. ¨ A fever. · You get a rash. Watch closely for changes in your health, and be sure to contact your doctor if:  · You are not getting better after 1 day (24 hours). · You do not get better as expected. Where can you learn more? Go to http://juju-karol.info/. [facility] in the search box to learn more about \"Cellulitis: Care Instructions. \"  Current as of: October 13, 2016  Content Version: 11.3  © 3152-6158 [employer]. Care instructions adapted under license by [employer] (which disclaims liability or warranty for this information). If you have questions about a medical condition or this instruction, always ask your healthcare professional. [place] [address] any warranty or liability for your use of this information.

## 2017-10-10 ENCOUNTER — OFFICE VISIT (OUTPATIENT)
Dept: ORTHOPEDIC SURGERY | Age: 82
End: 2017-10-10

## 2017-10-10 VITALS
SYSTOLIC BLOOD PRESSURE: 130 MMHG | OXYGEN SATURATION: 99 % | TEMPERATURE: 98.9 F | WEIGHT: 133.6 LBS | BODY MASS INDEX: 23.67 KG/M2 | RESPIRATION RATE: 16 BRPM | DIASTOLIC BLOOD PRESSURE: 67 MMHG | HEART RATE: 59 BPM | HEIGHT: 63 IN

## 2017-10-10 DIAGNOSIS — M79.641 RIGHT HAND PAIN: ICD-10-CM

## 2017-10-10 DIAGNOSIS — S63.501A WRIST SPRAIN, RIGHT, INITIAL ENCOUNTER: ICD-10-CM

## 2017-10-10 DIAGNOSIS — M19.041 PRIMARY OSTEOARTHRITIS OF RIGHT HAND: Primary | ICD-10-CM

## 2017-10-10 DIAGNOSIS — L03.113 RIGHT FOREARM CELLULITIS: ICD-10-CM

## 2017-10-10 RX ORDER — ACETAMINOPHEN AND CODEINE PHOSPHATE 300; 30 MG/1; MG/1
1 TABLET ORAL
Qty: 28 TAB | Refills: 0 | Status: SHIPPED | OUTPATIENT
Start: 2017-10-10 | End: 2018-04-13

## 2017-10-10 NOTE — PATIENT INSTRUCTIONS
Perform gentle hand and wrist exercises  Refill provided for Tylenol #3  No heavy lifting with right hand  Use over the counter topical Biofreeze or Asper Cream with Lidocaine as needed as directed  Follow up in 3 weeks or sooner as needed         Hand Pain: Care Instructions  Your Care Instructions  Common causes of hand pain are overuse and injuries, such as might happen during sports or home repair projects. Everyday wear and tear, especially as you get older, also can cause hand pain. Most minor hand injuries will heal on their own, and home treatment is usually all you need to do. If you have sudden and severe pain, you may need tests and treatment. Follow-up care is a key part of your treatment and safety. Be sure to make and go to all appointments, and call your doctor if you are having problems. Its also a good idea to know your test results and keep a list of the medicines you take. How can you care for yourself at home? · Take pain medicines exactly as directed. ¨ If the doctor gave you a prescription medicine for pain, take it as prescribed. ¨ If you are not taking a prescription pain medicine, ask your doctor if you can take an over-the-counter medicine. · Rest and protect your hand. Take a break from any activity that may cause pain. · Put ice or a cold pack on your hand for 10 to 20 minutes at a time. Put a thin cloth between the ice and your skin. · Prop up the sore hand on a pillow when you ice it or anytime you sit or lie down during the next 3 days. Try to keep it above the level of your heart. This will help reduce swelling. · If your doctor recommends a sling, splint, or elastic bandage to support your hand, wear it as directed. When should you call for help? Call 911 anytime you think you may need emergency care. For example, call if:  · Your hand turns cool or pale or changes color.   Call your doctor now or seek immediate medical care if:  · You cannot move your hand.  · Your hand pops, moves out of its normal position, and then returns to its normal position. · You have signs of infection, such as:  ¨ Increased pain, swelling, warmth, or redness. ¨ Red streaks leading from the sore area. ¨ Pus draining from a place on your hand. ¨ A fever. · Your hand feels numb or tingly. Watch closely for changes in your health, and be sure to contact your doctor if:  · Your hand feels unstable when you try to use it. · You do not get better as expected. · You have any new symptoms, such as swelling. · Bruises from an injury to your hand last longer than 2 weeks. Where can you learn more? Go to http://juju-karol.info/. Enter R273 in the search box to learn more about \"Hand Pain: Care Instructions. \"  Current as of: March 20, 2017  Content Version: 11.3  © 3335-9725 [employer]. Care instructions adapted under license by [employer] (which disclaims liability or warranty for this information). If you have questions about a medical condition or this instruction, always ask your healthcare professional. [place] [address] any warranty or liability for your use of this information.

## 2017-10-10 NOTE — PROGRESS NOTES
AMBULATORY PROGRESS NOTE      Patient: Bhargavi Park             MRN: [id number]     SSN: [id number] There is no height or weight on file to calculate BMI. YOB: 1917             AGE: 80 y.o. SEX: female    PCP: Epifanio Steele MD    IMPRESSION/DIAGNOSIS AND TREATMENT PLAN     DIAGNOSES  No diagnosis found. No orders of the defined types were placed in this encounter. Bhargavi Park understands her diagnoses and the proposed plan. Plan:    Perform gentle hand and wrist exercises  Refill provided for Tylenol #3  No heavy lifting with right hand  Use over the counter topical Biofreeze or Asper Cream with Lidocaine as needed as directed  Follow up in 3-4 weeks or sooner as needed      HPI AND EXAMINATION     Lavern Zuleta IS A 80 y.o. female who presents to my outpatient office for follow up of hand and wrist pain. At last visit, I recommended that she finishes her Keflex 500 mg. The patient presents to the office today for follow up for right wrist cellulitis. Cellulitis has resolved, but states she has some pain the right 5th MCP region. She states that she thinks she aggreivated her hand while pulling on refrigerator door.     Cardiovascular/Peripheral Vascular: Normal Pulses to each hand and foot  Musculoskeletal:  LOCATION: right hand      HAND, WRIST, FOREARM:  right    Integumentary: No rashes, skin patches, wounds, blisters, or abrasions                         Warm and normal color. No regions of expressible drainage       Deformities:  Is not present       Swelling: Regions of abnormal swelling : None to right hand/wrist       Tenderness: There is regions of tenderness: Slight. .. Along the right 5th MCP region.  She can flex and extend fingers and wrist.        Motor/Strength/Tone Exam: normal 5/5 strength in all tested muscle groups       Sensory Exam:  no sensory deficits noted       Stability Testing: NONE       ROM: full range of motion        Contractures:  None to affected region         Vascular : Normal capillary refill and digital coloration                        No embolic phenomena to the toes or hands                        Edema is not present         Neuro Exam:  Sensation : no focal                                  Motor function: WNL    CHART REVIEW     Past Medical History:   Diagnosis Date    Cardiac echocardiogram 03/21/2008    EF 60-65%. No RWMA. RVSP 35 mmHg. Mild AI. Mild MR. Mild TR. Mild PI.  Cardiac nuclear imaging test 03/29/2004    A-fib. No evidence of ischemia or prior infarction. EF 59%. No WMA.  Cardiovascular lower extremity venous duplex 10/05/2011    No deep vein or superficial thrombosis bilaterally. Bilateral calf veins w/calcific changes.  Diabetes ([address] 04/2006    Essential hypertension, benign     GERD (gastroesophageal reflux disease)     Hypercholesterolemia     Hypertension     Long term (current) use of anticoagulants 3/14/2011    Paroxysmal atrial fibrillation (HCC)     S/P total hysterectomy late 1960's     Current Outpatient Prescriptions   Medication Sig    acetaminophen-codeine (TYLENOL-CODEINE #3) 300-30 mg per tablet Take 1 Tab by mouth every four (4) hours as needed for Pain. Max Daily Amount: 6 Tabs.  ibuprofen (MOTRIN) 400 mg tablet Take 1 Tab by mouth every six (6) hours as needed for Pain.  cephALEXin (KEFLEX) 500 mg capsule Take 1 Cap by mouth four (4) times daily.  lansoprazole (PREVACID) 30 mg capsule Take 1 Cap by mouth Daily (before breakfast).  terazosin (HYTRIN) 5 mg capsule Take 1 Cap by mouth nightly.  digoxin (LANOXIN) 0.125 mg tablet Take 1 Tab by mouth daily.  chlorthalidone (HYGROTEN) 25 mg tablet Take 1 Tab by mouth daily.  gemfibrozil (LOPID) 600 mg tablet Take 1 Tab by mouth daily.  ACETAMINOPHEN (TYLENOL ARTHRITIS PAIN PO) Take  by mouth.  metoprolol succinate (TOPROL-XL) 25 mg XL tablet Take 1 Tab by mouth daily.     metFORMIN (GLUCOPHAGE) 500 mg tablet Take 1 Tab by mouth two (2) times daily (with meals).  mirabegron ER (MYRBETRIQ) 25 mg ER tablet Take 1 Tab by mouth daily.  potassium chloride (K-DUR, KLOR-CON) 20 mEq tablet Take 1 Tab by mouth daily.  lisinopril (PRINIVIL, ZESTRIL) 20 mg tablet Take 1 Tab by mouth daily.  aspirin 81 mg tablet Take 1 Tab by mouth daily.  glucose blood VI test strips (ACCU-CHEK COMPACT TEST) strip by Does Not Apply route. Use as directed     No current facility-administered medications for this visit. No Known Allergies  Past Surgical History:   Procedure Laterality Date    HX CATARACT REMOVAL      bilateral    HX HYSTERECTOMY  late      Social History     Occupational History    Not on file. Social History Main Topics    Smoking status: Never Smoker    Smokeless tobacco: Never Used    Alcohol use No    Drug use: No    Sexual activity: Not on file     Family History   Problem Relation Age of Onset    Hypertension Mother     Heart Failure Father       at age 80 from CHF    Diabetes Sister     Diabetes Brother        REVIEW OF SYSTEMS : 10/10/2017  ALL BELOW ARE Negative except : SEE HPI       Constitutional: Negative for fever, chills and weight loss. Neg Weigh Loss  Cardiovascular: Negative for chest pain, claudication and leg swelling. SOB, CHAPA   Gastrointestinal: Negative for  pain, N/V/D/C, Blood in stool or urine,dysuria, hematuria,        Incontinence, pelvic pain  Musculoskeletal: see HPI. Neurological: Negative for dizziness and weakness. Negative for headaches,Visual Changes, Confusion, Seizures,   Psychiatric/Behavioral: Negative for depression, memory loss and substance abuse. Extremities:  Negative for  hair changes, rash or skin lesion changes. Hematologic: Negative for Bleeding problems, bruising, pallor or swollen lymph nodes.   Peripheral Vascular: No calf pain, vascular vein tenderness to calf pain              No calf throbbing, posterior knee throbbing pain    DIAGNOSTIC IMAGING     No notes on file    Sesar Horne PA-C

## 2017-10-10 NOTE — MR AVS SNAPSHOT
Visit Information Date & Time Provider Department Dept. Phone Encounter #  
 10/10/2017  1:20 PM Moiz Flores MD [address] [facility] [id number] Follow-up Instructions Return in about 3 weeks (around 10/31/2017) for follow up evaluation. Your Appointments 11/13/2017  1:40 PM  
Follow Up with Braxton Oconnor MD  
[facility] ([facility]) Appt Note: 6 month f/up; mailed ltr; Rescheduling Appointment From 08/15/2017; mailed ltr; 6 month f/up/r/s'd with the patient-SHAZIA Garner [address] [address] [address] [phone] [address] [place] [address] [address] Upcoming Health Maintenance Date Due  
 EYE EXAM RETINAL OR DILATED Q1 9/5/[birth year]27 GLAUCOMA SCREENING Q2Y 1/1/2017 INFLUENZA AGE 9 TO ADULT 8/1/2017 HEMOGLOBIN A1C Q6M 8/14/2017 MEDICARE YEARLY EXAM 8/17/2017 MICROALBUMIN Q1 2/14/2018 LIPID PANEL Q1 2/14/2018 FOOT EXAM Q1 3/6/2018 DTaP/Tdap/Td series (2 - Td) 8/16/2026 Allergies as of 10/10/2017  Review Complete On: 10/10/2017 By: Ramya Santana PA-C No Known Allergies Current Immunizations  Never Reviewed No immunizations on file. Not reviewed this visit You Were Diagnosed With   
  
 Codes Comments Primary osteoarthritis of right hand    -  Primary ICD-10-CM: M19.041 ICD-9-CM: 715.14 Wrist sprain, right, initial encounter     ICD-10-CM: Y47.772D ICD-9-CM: 842.00 Right hand pain     ICD-10-CM: M79.641 ICD-9-CM: 729.5 Right forearm cellulitis     ICD-10-CM: J89.499 ICD-9-CM: 917. 3 Vitals BP Pulse Temp Resp Height(growth percentile) Weight(growth percentile) 130/67 (BP 1 Location: Left arm, BP Patient Position: Sitting) (!) 59 98.9 °F (37.2 °C) (Oral) 16 5' 3\" (1.6 m) 133 lb 9.6 oz (60.6 kg) SpO2 BMI OB Status Smoking Status 99% 23.67 kg/m2 Hysterectomy Never Smoker BMI and BSA Data Body Mass Index Body Surface Area  
 23.67 kg/m 2 1.64 m 2 Preferred Pharmacy Pharmacy Name Phone [id number] Brandee Khanna, [address] [facility] [facility] [phone] Your Updated Medication List  
  
   
This list is accurate as of: 10/10/17  2:21 PM.  Always use your most recent med list.  
  
  
  
  
 acetaminophen-codeine 300-30 mg per tablet Commonly known as:  TYLENOL-CODEINE #3 Take 1 Tab by mouth every six (6) hours as needed for Pain. Max Daily Amount: 4 Tabs. aspirin 81 mg tablet Take 1 Tab by mouth daily. cephALEXin 500 mg capsule Commonly known as:  Tarry Reese Take 1 Cap by mouth four (4) times daily. chlorthalidone 25 mg tablet Commonly known as:  Annelle Satnam Take 1 Tab by mouth daily. digoxin 0.125 mg tablet Commonly known as:  LANOXIN Take 1 Tab by mouth daily. gemfibrozil 600 mg tablet Commonly known as:  LOPID Take 1 Tab by mouth daily. glucose blood VI test strips strip Commonly known as:  ACCU-CHEK COMPACT TEST  
by Does Not Apply route. Use as directed  
  
 ibuprofen 400 mg tablet Commonly known as:  MOTRIN Take 1 Tab by mouth every six (6) hours as needed for Pain.  
  
 lansoprazole 30 mg capsule Commonly known as:  PREVACID Take 1 Cap by mouth Daily (before breakfast). lisinopril 20 mg tablet Commonly known as:  Ale Alona Take 1 Tab by mouth daily. metFORMIN 500 mg tablet Commonly known as:  GLUCOPHAGE Take 1 Tab by mouth two (2) times daily (with meals). metoprolol succinate 25 mg XL tablet Commonly known as:  TOPROL-XL Take 1 Tab by mouth daily. mirabegron ER 25 mg ER tablet Commonly known as:  MYRBETRIQ Take 1 Tab by mouth daily. potassium chloride 20 mEq tablet Commonly known as:  K-DUR, KLOR-CON Take 1 Tab by mouth daily. terazosin 5 mg capsule Commonly known as:  HYTRIN Take 1 Cap by mouth nightly. TYLENOL ARTHRITIS PAIN PO Take  by mouth. Prescriptions Printed Refills  
 acetaminophen-codeine (TYLENOL-CODEINE #3) 300-30 mg per tablet 0 Sig: Take 1 Tab by mouth every six (6) hours as needed for Pain. Max Daily Amount: 4 Tabs. Class: Print Route: Oral  
  
Follow-up Instructions Return in about 3 weeks (around 10/31/2017) for follow up evaluation. Patient Instructions Perform gentle hand and wrist exercises Refill provided for Tylenol #3 No heavy lifting with right hand Use over the counter topical Biofreeze or Asper Cream with Lidocaine as needed as directed Follow up in 3 weeks or sooner as needed Hand Pain: Care Instructions Your Care Instructions Common causes of hand pain are overuse and injuries, such as might happen during sports or home repair projects. Everyday wear and tear, especially as you get older, also can cause hand pain. Most minor hand injuries will heal on their own, and home treatment is usually all you need to do. If you have sudden and severe pain, you may need tests and treatment. Follow-up care is a key part of your treatment and safety. Be sure to make and go to all appointments, and call your doctor if you are having problems. Its also a good idea to know your test results and keep a list of the medicines you take. How can you care for yourself at home? · Take pain medicines exactly as directed. ¨ If the doctor gave you a prescription medicine for pain, take it as prescribed. ¨ If you are not taking a prescription pain medicine, ask your doctor if you can take an over-the-counter medicine. · Rest and protect your hand. Take a break from any activity that may cause pain. · Put ice or a cold pack on your hand for 10 to 20 minutes at a time. Put a thin cloth between the ice and your skin. · Prop up the sore hand on a pillow when you ice it or anytime you sit or lie down during the next 3 days. Try to keep it above the level of your heart. This will help reduce swelling. · If your doctor recommends a sling, splint, or elastic bandage to support your hand, wear it as directed. When should you call for help? Call 911 anytime you think you may need emergency care. For example, call if: 
· Your hand turns cool or pale or changes color. Call your doctor now or seek immediate medical care if: 
· You cannot move your hand. · Your hand pops, moves out of its normal position, and then returns to its normal position. · You have signs of infection, such as: 
¨ Increased pain, swelling, warmth, or redness. ¨ Red streaks leading from the sore area. ¨ Pus draining from a place on your hand. ¨ A fever. · Your hand feels numb or tingly. Watch closely for changes in your health, and be sure to contact your doctor if: 
· Your hand feels unstable when you try to use it. · You do not get better as expected. · You have any new symptoms, such as swelling. · Bruises from an injury to your hand last longer than 2 weeks. Where can you learn more? Go to http://juju-karol.info/. Enter R273 in the search box to learn more about \"Hand Pain: Care Instructions. \" Current as of: March 20, 2017 Content Version: 11.3 © 2596-8606 [employer]. Care instructions adapted under license by [employer] (which disclaims liability or warranty for this information). If you have questions about a medical condition or this instruction, always ask your healthcare professional. [place] [address] any warranty or liability for your use of this information. Introducing [facility]! [facility] introduces [employer] patient portal. Now you can access parts of your medical record, email your doctor's office, and request medication refills online. 1. In your internet browser, go to [email or web address] 2. Click on the First Time User? Click Here link in the Sign In box. You will see the New Member Sign Up page. 3. Enter your [employer] Access Code exactly as it appears below. You will not need to use this code after youve completed the sign-up process. If you do not sign up before the expiration date, you must request a new code. · [employer] Access Code: [email or web address] Expires: 11/26/2017 10:01 AM 
 
4. Enter the last four digits of your Social Security Number (xxxx) and Date of Birth (mm/dd/yyyy) as indicated and click Submit. You will be taken to the next sign-up page. 5. Create a [employer]ourt ID. This will be your [employer] login ID and cannot be changed, so think of one that is secure and easy to remember. 6. Create a [employer] password. You can change your password at any time. 7. Enter your Password Reset Question and Answer. This can be used at a later time if you forget your password. 8. Enter your e-mail address. You will receive e-mail notification when new information is available in [address] 9. Click Sign Up. You can now view and download portions of your medical record. 10. Click the Download Summary menu link to download a portable copy of your medical information. If you have questions, please visit the Frequently Asked Questions section of the [employer] website. Remember, [employer] is NOT to be used for urgent needs. For medical emergencies, dial 911. Now available from your iPhone and Android! Please provide this summary of care documentation to your next provider. Your primary care clinician is listed as [address]. If you have any questions after today's visit, please call [phone].

## 2017-11-07 ENCOUNTER — OFFICE VISIT (OUTPATIENT)
Dept: ORTHOPEDIC SURGERY | Age: 82
End: 2017-11-07

## 2017-11-07 VITALS
BODY MASS INDEX: 23.57 KG/M2 | HEIGHT: 63 IN | WEIGHT: 133 LBS | OXYGEN SATURATION: 99 % | TEMPERATURE: 97 F | SYSTOLIC BLOOD PRESSURE: 117 MMHG | DIASTOLIC BLOOD PRESSURE: 61 MMHG | HEART RATE: 59 BPM

## 2017-11-07 DIAGNOSIS — S63.501A WRIST SPRAIN, RIGHT, INITIAL ENCOUNTER: ICD-10-CM

## 2017-11-07 DIAGNOSIS — M19.041 PRIMARY OSTEOARTHRITIS OF RIGHT HAND: Primary | ICD-10-CM

## 2017-11-07 DIAGNOSIS — M25.531 RIGHT WRIST PAIN: ICD-10-CM

## 2017-11-07 NOTE — PATIENT INSTRUCTIONS
Please follow up in 4 weeks. You are advised to contact us if your condition worsens. Wrist: Exercises  Your Care Instructions  Here are some examples of exercises for your wrist. Start each exercise slowly. Ease off the exercise if you start to have pain. Your doctor or your physical or occupational therapist will tell you when you can start these exercises. He or she will also tell you which ones will work best for you. How to do the exercises  Prayer stretch    1. Start with your palms together in front of your chest just below your chin. 2. Slowly lower your hands toward your waistline, keeping your hands close to your stomach and your palms together until you feel a mild to moderate stretch under your forearms. 3. Hold for at least 15 to 30 seconds. Repeat 2 to 4 times. Wrist flexor stretch    1. Extend your arm in front of you with your palm up. 2. Bend your wrist, pointing your hand toward the floor. 3. With your other hand, gently bend your wrist farther until you feel a mild to moderate stretch in your forearm. 4. Hold for at least 15 to 30 seconds. Repeat 2 to 4 times. Wrist extensor stretch    1. Repeat steps 1 through 4 of the stretch above, but begin with your extended hand palm down. Follow-up care is a key part of your treatment and safety. Be sure to make and go to all appointments, and call your doctor if you are having problems. It's also a good idea to know your test results and keep a list of the medicines you take. Where can you learn more? Go to http://juju-karol.info/. Enter 96771 [id number] 01 in the search box to learn more about \"Wrist: Exercises. \"  Current as of: March 21, 2017  Content Version: 11.4  © 3787-8315 [employer]. Care instructions adapted under license by [employer] (which disclaims liability or warranty for this information).  If you have questions about a medical condition or this instruction, always ask your healthcare professional. [address] any warranty or liability for your use of this information.

## 2017-11-07 NOTE — PROGRESS NOTES
AMBULATORY PROGRESS NOTE      Patient: Harjit Hensley             MRN: [id number]     SSN: [id number] Body mass index is 23.56 kg/(m^2). YOB: 1917     AGE: 80 y.o. EX: female    PCP: Yennifer Sage MD    IMPRESSION/DIAGNOSIS AND TREATMENT PLAN     DIAGNOSES  1. Primary osteoarthritis of right hand    2. Right wrist pain    3. Wrist sprain, right, initial encounter        Orders Placed This Encounter   [address] understands her diagnoses and the proposed plan. Plan:    My recommendation for this individual, Ms. Zenaida Tripathi, is to have her go to occupational therapy for a removable thermoplastic splint, removable, as well as some therapy to help restore some of the range of motion of her wrist.  Overall, she is doing much better than she had been doing in the past.      Since I saw her last, she has been using a wrist guard. She has been using her wrist a little bit more. She denies any fevers, shakes, chills, night sweats. She does report having some stiffness of her wrist.  She arrives here with, I believe, her daughter. Cardiovascular/Peripheral Vascular: Normal Pulses to each hand and foot  Musculoskeletal:  LOCATION: right hand      HAND, WRIST, FOREARM:  right    Integumentary: No rashes, skin patches, wounds, blisters, or abrasions                         Warm and normal color. No regions of expressible drainage       Deformities:  Is not present       Swelling: Regions of abnormal swelling : None to right hand/wrist       Tenderness: There is regions of tenderness: Slight. .. Along the right 5th MCP region.  She can flex and extend fingers and wrist.        Motor/Strength/Tone Exam: normal 5/5 strength in all tested muscle groups       Sensory Exam:  no sensory deficits noted       Stability Testing: NONE       ROM: full range of motion        Contractures:  None to affected region         Vascular : Normal capillary refill and digital coloration                        No embolic phenomena to the toes or hands                        Edema is not present         Neuro Exam:  Sensation : no focal                                  Motor function: WNL    CHART REVIEW     Past Medical History:   Diagnosis Date    Cardiac echocardiogram 03/21/2008    EF 60-65%. No RWMA. RVSP 35 mmHg. Mild AI. Mild MR. Mild TR. Mild PI.  Cardiac nuclear imaging test 03/29/2004    A-fib. No evidence of ischemia or prior infarction. EF 59%. No WMA.  Cardiovascular lower extremity venous duplex 10/05/2011    No deep vein or superficial thrombosis bilaterally. Bilateral calf veins w/calcific changes.  Diabetes ([facility] [address] 04/2006    Essential hypertension, benign     GERD (gastroesophageal reflux disease)     Hypercholesterolemia     Hypertension     Long term (current) use of anticoagulants 3/14/2011    Paroxysmal atrial fibrillation (HCC)     S/P total hysterectomy late 1960's     Current Outpatient Prescriptions   Medication Sig    acetaminophen-codeine (TYLENOL-CODEINE #3) 300-30 mg per tablet Take 1 Tab by mouth every six (6) hours as needed for Pain. Max Daily Amount: 4 Tabs.  ibuprofen (MOTRIN) 400 mg tablet Take 1 Tab by mouth every six (6) hours as needed for Pain.  lansoprazole (PREVACID) 30 mg capsule Take 1 Cap by mouth Daily (before breakfast).  terazosin (HYTRIN) 5 mg capsule Take 1 Cap by mouth nightly.  digoxin (LANOXIN) 0.125 mg tablet Take 1 Tab by mouth daily.  chlorthalidone (HYGROTEN) 25 mg tablet Take 1 Tab by mouth daily.  gemfibrozil (LOPID) 600 mg tablet Take 1 Tab by mouth daily.  ACETAMINOPHEN (TYLENOL ARTHRITIS PAIN PO) Take  by mouth.  metoprolol succinate (TOPROL-XL) 25 mg XL tablet Take 1 Tab by mouth daily.  metFORMIN (GLUCOPHAGE) 500 mg tablet Take 1 Tab by mouth two (2) times daily (with meals).  mirabegron ER (MYRBETRIQ) 25 mg ER tablet Take 1 Tab by mouth daily.     potassium chloride (K-DUR, KLOR-CON) 20 mEq tablet Take 1 Tab by mouth daily.  lisinopril (PRINIVIL, ZESTRIL) 20 mg tablet Take 1 Tab by mouth daily.  aspirin 81 mg tablet Take 1 Tab by mouth daily.  glucose blood VI test strips (ACCU-CHEK COMPACT TEST) strip by Does Not Apply route. Use as directed    cephALEXin (KEFLEX) 500 mg capsule Take 1 Cap by mouth four (4) times daily. No current facility-administered medications for this visit. No Known Allergies  Past Surgical History:   Procedure Laterality Date    HX CATARACT REMOVAL      bilateral    HX HYSTERECTOMY  late      Social History     Occupational History    Not on file. Social History Main Topics    Smoking status: Never Smoker    Smokeless tobacco: Never Used    Alcohol use No    Drug use: No    Sexual activity: Not on file     Family History   Problem Relation Age of Onset    Hypertension Mother     Heart Failure Father       at age 80 from CHF    Diabetes Sister     Diabetes Brother        REVIEW OF SYSTEMS : 2017  ALL BELOW ARE Negative except : SEE HPI       Constitutional: Negative for fever, chills and weight loss. Neg Weigh Loss  Cardiovascular: Negative for chest pain, claudication and leg swelling. SOB, CHAPA   Gastrointestinal: Negative for  pain, N/V/D/C, Blood in stool or urine,dysuria, hematuria,        Incontinence, pelvic pain  Musculoskeletal: see HPI. Neurological: Negative for dizziness and weakness. Negative for headaches,Visual Changes, Confusion, Seizures,   Psychiatric/Behavioral: Negative for depression, memory loss and substance abuse. Extremities:  Negative for  hair changes, rash or skin lesion changes. Hematologic: Negative for Bleeding problems, bruising, pallor or swollen lymph nodes.   Peripheral Vascular: No calf pain, vascular vein tenderness to calf pain              No calf throbbing, posterior knee throbbing pain    DIAGNOSTIC IMAGING     No notes on file    Written by Zuleyma Hoffman, as dictated by Omari Valdovinos MD. I, DrDiogenes, Omari Valdovinos MD, confirm that all documentation is accurate.

## 2017-11-07 NOTE — MR AVS SNAPSHOT
Visit Information Date & Time Provider Department Dept. Phone Encounter #  
 11/7/2017  1:00 PM Tika Del Valle, [address] [facility] [phone] [id number] Your Appointments 11/13/2017  1:40 PM  
Follow Up with Sohan Lloyd MD  
[facility] [employer] ([employer] [facility]) Appt Note: 6 month f/up; mailed ltr; Rescheduling Appointment From 08/15/2017; mailed ltr; 6 month f/up/r/s'd with the patient-SHAZIA Garner [id number] [address] [address] [phone] [address] [place] [address] [address] Upcoming Health Maintenance Date Due  
 EYE EXAM RETINAL OR DILATED Q1 9/5/1927 GLAUCOMA SCREENING Q2Y 1/1/2017 Influenza Age 5 to Adult 8/1/2017 HEMOGLOBIN A1C Q6M 8/14/2017 MEDICARE YEARLY EXAM 8/17/2017 MICROALBUMIN Q1 2/14/2018 LIPID PANEL Q1 2/14/2018 FOOT EXAM Q1 3/6/2018 DTaP/Tdap/Td series (2 - Td) 8/16/2026 Allergies as of 11/7/2017  Review Complete On: 11/7/2017 By: iTka Del Valle MD  
 No Known Allergies Current Immunizations  Never Reviewed No immunizations on file. Not reviewed this visit You Were Diagnosed With   
  
 Codes Comments Right wrist pain    -  Primary ICD-10-CM: M25.531 ICD-9-CM: 719.43 Vitals BP Pulse Temp Height(growth percentile) Weight(growth percentile) SpO2  
 117/61 (!) 59 97 °F (36.1 °C) (Oral) 5' 3\" (1.6 m) 133 lb (60.3 kg) 99% BMI OB Status Smoking Status 23.56 kg/m2 Hysterectomy Never Smoker BMI and BSA Data Body Mass Index Body Surface Area  
 23.56 kg/m 2 1.64 m 2 Preferred Pharmacy Pharmacy Name Phone [id number] [facility] [phone] Your Updated Medication List  
  
   
This list is accurate as of: 11/7/17  1:42 PM.  Always use your most recent med list.  
  
  
  
  
 acetaminophen-codeine 300-30 mg per tablet Commonly known as:  TYLENOL-CODEINE #3 Take 1 Tab by mouth every six (6) hours as needed for Pain. Max Daily Amount: 4 Tabs. aspirin 81 mg tablet Take 1 Tab by mouth daily. cephALEXin 500 mg capsule Commonly known as:  Kenya Rummer Take 1 Cap by mouth four (4) times daily. chlorthalidone 25 mg tablet Commonly known as:  Arielle Neelima Take 1 Tab by mouth daily. digoxin 0.125 mg tablet Commonly known as:  LANOXIN Take 1 Tab by mouth daily. gemfibrozil 600 mg tablet Commonly known as:  LOPID Take 1 Tab by mouth daily. glucose blood VI test strips strip Commonly known as:  ACCU-CHEK COMPACT TEST  
by Does Not Apply route. Use as directed  
  
 ibuprofen 400 mg tablet Commonly known as:  MOTRIN Take 1 Tab by mouth every six (6) hours as needed for Pain.  
  
 lansoprazole 30 mg capsule Commonly known as:  PREVACID Take 1 Cap by mouth Daily (before breakfast). lisinopril 20 mg tablet Commonly known as:  Villasenor Edman Take 1 Tab by mouth daily. metFORMIN 500 mg tablet Commonly known as:  GLUCOPHAGE Take 1 Tab by mouth two (2) times daily (with meals). metoprolol succinate 25 mg XL tablet Commonly known as:  TOPROL-XL Take 1 Tab by mouth daily. mirabegron ER 25 mg ER tablet Commonly known as:  MYRBETRIQ Take 1 Tab by mouth daily. potassium chloride 20 mEq tablet Commonly known as:  K-DUR, KLOR-CON Take 1 Tab by mouth daily. terazosin 5 mg capsule Commonly known as:  HYTRIN Take 1 Cap by mouth nightly. TYLENOL ARTHRITIS PAIN PO Take  by mouth. We Performed the Following REFERRAL TO OCCUPATIONAL THERAPY [REF53 Custom] Comments:  
 Right wrist pain Previous cellulitis 
eval and treat to include parafon wax Thermaplast splint 2-3X  A week X 3-4 weeks Referral Information Referral ID Referred By Referred To [id number] DARRELL HERNANDEZ Not Available Visits Status Start Date End Date 1 New Request 11/7/17 11/7/18 If your referral has a status of pending review or denied, additional information will be sent to support the outcome of this decision. Patient Instructions Please follow up in 4 weeks. You are advised to contact us if your condition worsens. Wrist: Exercises Your Care Instructions Here are some examples of exercises for your wrist. Start each exercise slowly. Ease off the exercise if you start to have pain. Your doctor or your physical or occupational therapist will tell you when you can start these exercises. He or she will also tell you which ones will work best for you. How to do the exercises Prayer stretch 1. Start with your palms together in front of your chest just below your chin. 2. Slowly lower your hands toward your waistline, keeping your hands close to your stomach and your palms together until you feel a mild to moderate stretch under your forearms. 3. Hold for at least 15 to 30 seconds. Repeat 2 to 4 times. Wrist flexor stretch 1. Extend your arm in front of you with your palm up. 2. Bend your wrist, pointing your hand toward the floor. 3. With your other hand, gently bend your wrist farther until you feel a mild to moderate stretch in your forearm. 4. Hold for at least 15 to 30 seconds. Repeat 2 to 4 times. Wrist extensor stretch 1. Repeat steps 1 through 4 of the stretch above, but begin with your extended hand palm down. Follow-up care is a key part of your treatment and safety. Be sure to make and go to all appointments, and call your doctor if you are having problems. It's also a good idea to know your test results and keep a list of the medicines you take. Where can you learn more? Go to http://juju-karol.info/. Enter [id number] in the search box to learn more about \"Wrist: Exercises. \" Current as of: March 21, 2017 Content Version: 11.4 © 7825-4095 Healthwise, [employer]. Care instructions adapted under license by [employer] (which disclaims liability or warranty for this information). If you have questions about a medical condition or this instruction, always ask your healthcare professional. [address] any warranty or liability for your use of this information. Introducing [facility]! Petey Villatoro introduces [employer] patient portal. Now you can access parts of your medical record, email your doctor's office, and request medication refills online. 1. In your internet browser, go to [email or web address] 2. Click on the First Time User? Click Here link in the Sign In box. You will see the New Member Sign Up page. 3. Enter your [employer]ron Access Code exactly as it appears below. You will not need to use this code after youve completed the sign-up process. If you do not sign up before the expiration date, you must request a new code. · [employer] Access Code: [email or web address] Expires: 11/26/2017  9:01 AM 
 
4. Enter the last four digits of your Social Security Number (xxxx) and Date of Birth (mm/dd/yyyy) as indicated and click Submit. You will be taken to the next sign-up page. 5. Create a Kyron ID. This will be your Kyron login ID and cannot be changed, so think of one that is secure and easy to remember. 6. Create a Kyron password. You can change your password at any time. 7. Enter your Password Reset Question and Answer. This can be used at a later time if you forget your password. 8. Enter your e-mail address. You will receive e-mail notification when new information is available in [address] 9. Click Sign Up. You can now view and download portions of your medical record. 10. Click the Download Summary menu link to download a portable copy of your medical information. If you have questions, please visit the Frequently Asked Questions section of the [employer] website. Remember, [employer] is NOT to be used for urgent needs. For medical emergencies, dial 911. Now available from your iPhone and Android! Please provide this summary of care documentation to your next provider. Your primary care clinician is listed as [address]. If you have any questions after today's visit, please call [phone].

## 2017-11-09 DIAGNOSIS — K21.9 GASTROESOPHAGEAL REFLUX DISEASE, ESOPHAGITIS PRESENCE NOT SPECIFIED: Primary | ICD-10-CM

## 2017-11-09 RX ORDER — LANSOPRAZOLE 30 MG/1
30 CAPSULE, DELAYED RELEASE ORAL
Qty: 90 CAP | Refills: 0 | Status: SHIPPED | OUTPATIENT
Start: 2017-11-09 | End: 2018-02-14 | Stop reason: SDUPTHER

## 2017-11-09 NOTE — TELEPHONE ENCOUNTER
Pt called in requesting refill of her   Requested Prescriptions     Pending Prescriptions Disp Refills    lansoprazole (PREVACID) 30 mg capsule 90 Cap 0     Sig: Take 1 Cap by mouth Daily (before breakfast).    [place]

## 2017-11-13 ENCOUNTER — OFFICE VISIT (OUTPATIENT)
Dept: CARDIOLOGY CLINIC | Age: 82
End: 2017-11-13

## 2017-11-13 VITALS
WEIGHT: 135 LBS | OXYGEN SATURATION: 95 % | HEIGHT: 63 IN | DIASTOLIC BLOOD PRESSURE: 64 MMHG | SYSTOLIC BLOOD PRESSURE: 112 MMHG | BODY MASS INDEX: 23.92 KG/M2 | HEART RATE: 60 BPM

## 2017-11-13 DIAGNOSIS — I48.0 PAROXYSMAL ATRIAL FIBRILLATION (HCC): Primary | ICD-10-CM

## 2017-11-13 DIAGNOSIS — I35.0 MILD AORTIC STENOSIS: ICD-10-CM

## 2017-11-13 DIAGNOSIS — I10 ESSENTIAL HYPERTENSION, BENIGN: ICD-10-CM

## 2017-11-13 NOTE — PROGRESS NOTES
HISTORY OF PRESENT ILLNESS  Nate Leo is a 80 y.o. female. HPI  She just turned [age] [de-identified] years of age in September of 2017. She looks very good and remains to be intelligent. She does not even have hearing difficulties. She has no joint pain or back pain. She denies chest pain, resting dyspnea, orthopnea or PND. She has very little, if any, dyspnea on exertion. She has all of her teeth with the exception of a few missing teeth in the back. She denies palpitations, dizziness or syncope. She has had no symptoms to indicate TIA or amaurosis fugax. She has a history of hypertension and acute pulmonary edema in 1988 at which time she had normal cardiac catheterization and normal left ventricular systolic function with hyperdynamic contractions. There was evidence of left ventricular diastolic dysfunction at that time which was felt to be the etiology of her pulmonary edema.    She had the atrial fibrillation for which she was anticoagulated with Coumadin for a while until April 2012 when she was seen in my office when she was found to be in sinus rhythm. Because of the fact that she was in sinus rhythm and her age was in the mid 80s, a decision was made to discontinue Coumadin altogether. She has since remained in sinus rhythm. Review of Systems   Constitutional: Negative for malaise/fatigue and weight loss. HENT: Negative for hearing loss. Eyes: Negative for blurred vision and double vision. Respiratory: Negative for shortness of breath. Cardiovascular: Positive for leg swelling. Negative for chest pain, palpitations, orthopnea, claudication and PND. Gastrointestinal: Negative for blood in stool, heartburn and melena. Genitourinary: Negative for dysuria, frequency, hematuria and urgency. Musculoskeletal: Negative for back pain and joint pain. Skin: Negative for itching and rash. Neurological: Negative for dizziness, loss of consciousness and weakness. Psychiatric/Behavioral: Negative for depression and memory loss. Physical Exam   Constitutional: She is oriented to person, place, and time. She appears well-developed and well-nourished. HENT:   Head: Normocephalic and atraumatic. Eyes: Conjunctivae are normal. Pupils are equal, round, and reactive to light. Neck: Normal range of motion. Neck supple. No JVD present. Cardiovascular: Normal rate, regular rhythm, S1 normal and S2 normal.   No extrasystoles are present. PMI is not displaced. Exam reveals no gallop. Murmur heard. Harsh early systolic murmur is present with a grade of 2/6  at the upper right sternal border radiating to the neck  Pulses:       Carotid pulses are 3+ on the right side with bruit, and 3+ on the left side. Pulmonary/Chest: Effort normal. She has no rales. Abdominal: Soft. There is no tenderness. Musculoskeletal: She exhibits edema. trace   Neurological: She is alert and oriented to person, place, and time. No cranial nerve deficit. Skin: Skin is warm and dry. Psychiatric: She has a normal mood and affect. Her behavior is normal.     Visit Vitals    /64    Pulse 60    Ht 5' 3\" (1.6 m)    Wt 61.2 kg (135 lb)    SpO2 95%    BMI 23.91 kg/m2       Past Medical History:   Diagnosis Date    Cardiac echocardiogram 03/21/2008    EF 60-65%. No RWMA. RVSP 35 mmHg. Mild AI. Mild MR. Mild TR. Mild PI.  Cardiac nuclear imaging test 03/29/2004    A-fib. No evidence of ischemia or prior infarction. EF 59%. No WMA.  Cardiovascular lower extremity venous duplex 10/05/2011    No deep vein or superficial thrombosis bilaterally. Bilateral calf veins w/calcific changes.     Diabetes ([address] 04/2006    Essential hypertension, benign     GERD (gastroesophageal reflux disease)     Hypercholesterolemia     Hypertension     Long term (current) use of anticoagulants 3/14/2011    Paroxysmal atrial fibrillation (HCC)     S/P total hysterectomy late        Social History     Social History    Marital status:      Spouse name: N/A    Number of children: N/A    Years of education: N/A     Occupational History    Not on file. Social History Main Topics    Smoking status: Never Smoker    Smokeless tobacco: Never Used    Alcohol use No    Drug use: No    Sexual activity: Not on file     Other Topics Concern    Not on file     Social History Narrative       Family History   Problem Relation Age of Onset    Hypertension Mother     Heart Failure Father       at age 80 from CHF    Diabetes Sister     Diabetes Brother        Past Surgical History:   Procedure Laterality Date    HX CATARACT REMOVAL      bilateral    HX HYSTERECTOMY  late        Current Outpatient Prescriptions   Medication Sig Dispense Refill    lansoprazole (PREVACID) 30 mg capsule Take 1 Cap by mouth Daily (before breakfast). 90 Cap 0    acetaminophen-codeine (TYLENOL-CODEINE #3) 300-30 mg per tablet Take 1 Tab by mouth every six (6) hours as needed for Pain. Max Daily Amount: 4 Tabs. 28 Tab 0    ibuprofen (MOTRIN) 400 mg tablet Take 1 Tab by mouth every six (6) hours as needed for Pain. 30 Tab 0    cephALEXin (KEFLEX) 500 mg capsule Take 1 Cap by mouth four (4) times daily. 40 Cap 0    terazosin (HYTRIN) 5 mg capsule Take 1 Cap by mouth nightly. 90 Cap 3    digoxin (LANOXIN) 0.125 mg tablet Take 1 Tab by mouth daily. 90 Tab 3    chlorthalidone (HYGROTEN) 25 mg tablet Take 1 Tab by mouth daily. 90 Tab 3    gemfibrozil (LOPID) 600 mg tablet Take 1 Tab by mouth daily. 90 Tab 3    ACETAMINOPHEN (TYLENOL ARTHRITIS PAIN PO) Take  by mouth.  metoprolol succinate (TOPROL-XL) 25 mg XL tablet Take 1 Tab by mouth daily. 90 Tab 3    metFORMIN (GLUCOPHAGE) 500 mg tablet Take 1 Tab by mouth two (2) times daily (with meals). 180 Tab 3    mirabegron ER (MYRBETRIQ) 25 mg ER tablet Take 1 Tab by mouth daily.  90 Tab 3    potassium chloride (K-DUR, KLOR-CON) 20 mEq tablet Take 1 Tab by mouth daily. 90 Tab 3    lisinopril (PRINIVIL, ZESTRIL) 20 mg tablet Take 1 Tab by mouth daily. 180 Tab 3    aspirin 81 mg tablet Take 1 Tab by mouth daily. 1 Tab 0    glucose blood VI test strips (ACCU-CHEK COMPACT TEST) strip by Does Not Apply route. Use as directed 1 Package 11       EKG: unchanged from previous tracings, normal sinus rhythm, RBBB, 1st degree AV block. ASSESSMENT and PLAN  Encounter Diagnoses   Name Primary?  Paroxysmal atrial fibrillation (HCC) Yes    Essential hypertension, benign     Mild aortic stenosis    She has been doing extremely well for her age of [age] [de-identified]. She has remained in sinus rhythm. She has had no symptoms to indicate recurrent atrial fibrillation. Her blood pressure has been well under control. At this point, she will be continued on the current medical regimen.

## 2017-11-13 NOTE — PATIENT INSTRUCTIONS
Continue current medications.    If you have any further questions or concerns, please contact our office. [phone]

## 2017-11-13 NOTE — PROGRESS NOTES
1. Have you been to the ER, urgent care clinic since your last visit? Hospitalized since your last visit? ER 9/19/17  2. Have you seen or consulted any other health care providers outside of the [address] [place] [address] since your last visit? Include any pap smears or colon screening.  no

## 2017-11-15 ENCOUNTER — TELEPHONE (OUTPATIENT)
Dept: FAMILY MEDICINE CLINIC | Age: 82
End: 2017-11-15

## 2017-11-15 NOTE — TELEPHONE ENCOUNTER
pts daughter request order for tobinator walker (the one with the seat) said her mom balance is off, worsening since last OV 09/21/2017    Would like to use the company in [facility], not sure of the name said off [address] and [address] [place] [address]

## 2017-11-16 RX ORDER — METOPROLOL SUCCINATE 25 MG/1
TABLET, EXTENDED RELEASE ORAL
Qty: 90 TAB | Refills: 3 | Status: SHIPPED | OUTPATIENT
Start: 2017-11-16 | End: 2018-05-21 | Stop reason: SDUPTHER

## 2017-11-21 ENCOUNTER — HOSPITAL ENCOUNTER (OUTPATIENT)
Dept: PHYSICAL THERAPY | Age: 82
Discharge: HOME | End: 2017-11-21
Payer: MEDICARE

## 2017-11-21 PROCEDURE — 97165 OT EVAL LOW COMPLEX 30 MIN: CPT

## 2017-11-21 PROCEDURE — G8984 CARRY CURRENT STATUS: HCPCS

## 2017-11-21 PROCEDURE — 97110 THERAPEUTIC EXERCISES: CPT

## 2017-11-21 PROCEDURE — G8985 CARRY GOAL STATUS: HCPCS

## 2017-11-21 NOTE — PROGRESS NOTES
Hand Therapy Evaluation and Daily Note    Patient Name: Dayana Gayle  Date:2017  : 1917  Age: 80 y.o.y/o  [x]  Patient  Verified  Payor: Haydee Dose / Plan: Suresh Rodriguez PPO / Product Type: PPO /    Referring Provider: MD Nathan Araiza MD Visit:  17  Onset Date:  2months    In time:1545  Out time:1630  Total Treatment Time (min): 45  Total Timed Codes (min): 15  1:1 Treatment Time ( [address] only): 45   Visit #: 1 of 8    Treatment Area: Pain in right wrist [M25.531]    Precautions: NA    Hand Dominance: right handed   Hand Involved: right    Total Evaluation Time: 30 min    History of Present Condition:  Patient is a 80 y.o. female with a chief complaint  of swelling and mild pain in the right wrist since September. Patient reports that she was weeding in her flowerbed and \"sprained\" her wrist and hand. She reports that the pain and swelling began 2 days after the sprain. MD places her in a prefabricated splint for the wrist for 3-4 weeks and she has stopped wearing that. Pain Rating:   Current: (0-no pain 10-debilitating pain) 2 / 10   At best: (0-no pain 10-debilitating pain) 1 / 10  At worst: (0-no pain 10-debilitating pain) 10 / 10  Location: right wrist  Type:  mild and moderate   Better with: medication, splinting support  Worse with: lifting and direct pressure    Medications/Allergies/Past Medical History:  See chart; reviewed with patient. Heart disease, osteoporosis, diabetes, arthritis, HTN, visual impairment    Diagnostic Tests: x-ray-No acute fracture or dislocation. Osseous structures are aligned anatomically. Vascular calcifications. Degenerative changes are seen in the radiocarpal joint. Calcification is seen in the cartilage of the distal radial ulnar and ulnar  carpal joints. Chondrocalcinosis is also seen in the MCP joints. Prior Level of Function: Independent without limitations in ADL and IADL activities.       Current Level of Function:  Limited use of the right wrist    Social History: , live alone in 1 story home. Occupation/Job Requirements: retired beautician     Observation: mild swelling noted    Palpation: mild pain     Range of Motion:   Wrist Active/Passive ROM  Wrist 11/21/17  R   L      Flex 55 55      Ext 48 65      UD 35       RD 15                 Strength:  Measurements: Taken with Henri Dynamometer, in Lbs   Level 2 11/21/17 Date Date Date Date Date Date   Right 15         Left 30         Deficit 50%         Change            Sensation:  No problems reported    Edema: GIRTH CHART measured in cm  Date: 11/21/17    Side R L   DPC circum. 19 19   Wrist Crease 17 16.5               Special Tests:    Provocative test:    ADLs  Feeding:        []MaxA   []ModA   []Teofilo   [] CGA   []SBA   [x]Meenakshi   []Independent  UE Dressing:       []MaxA   []ModA   []Teofilo   [] CGA   []SBA   [x]Meenakshi   []Independent  LE Dressing:       []MaxA   []ModA   []Teofilo   [] CGA   []SBA   [x]Meenakshi   []Independent  Grooming:       []MaxA   []ModA   []Teofilo   [] CGA   []SBA   [x]Meenakshi   []Independent  Toileting:       []MaxA   []ModA   []Teofilo   [] CGA   []SBA   [x]Meenakshi   []Independent  Bathing:       []MaxA   []ModA   []Teofilo   [] CGA   []SBA   [x]Meenakshi   []Independent  Light Meal Prep:    []MaxA   []ModA   []Teofilo   [] CGA   []SBA   [x]Meenakshi   []Independent  Household/Other: []MaxA   []ModA   []Teofilo   [] CGA   []SBA   [x]Meenakshi   []Independent  Adaptive Equip:     []MaxA   []ModA   []Teofilo   [] CGA   []SBA   []Meenakshi   []Independent  Driving:       []MaxA   []ModA   []Teofilo   [] CGA   []SBA   [x]Meenakshi   []Independent      Todays Treatment:  Patient received an initial evaluation today followed by education as to diagnosis, precautions and treatment plan. Patient was provided with a basic home exercise program including A/PROM.       OBJECTIVE    15 min Therapeutic Exercise:  [x] See flow sheet :   Rationale: increase ROM to improve the patients ability to increase functional ROM and use of the right wrist with ALD and IADL tasks      With   [] TE   [] TA   [] neuro   [] other: Patient Education: [x] Review HEP    [] Progressed/Changed HEP based on:   [] positioning   [] body mechanics   [] transfers   [] heat/ice application   [] Splint wear/care   [] Sensory re-education   [] scar management      [] other:      Pain Level (0-10 scale) post treatment: 2/10    Patient will continue to benefit from skilled OT services to address ROM deficits and address strength deficits to attain goals. Assessment: Patient is a [place] [age] y.o. female with a chief complaint  of swelling and mild pain in the right wrist since September. Patient reports that she was weeding in her flowerbed and \"sprained\" her wrist and hand. She reports that the pain and swelling began 2 days after the sprain. MD places her in a prefabricated splint for the wrist for 3-4 weeks and she has stopped wearing that per MD ok. She has mild swelling and pain at this time. She is now able to weight bear in the UE when transfering sit to stand. Patient received an initial evaluation today followed by education as to diagnosis, precautions and treatment plan. Patient was provided with a basic home exercise program including A/PROM. Evaluation Complexity: History MEDIUM Complexity : Expanded review of history including physical, cognitive and psychosocial  history  Examination MEDIUM Complexity : 3-5 performance deficits relating to physical, cognitive , or psychosocial skils that result in activity limitations and / or participation restrictions Clinical Decision Making MEDIUM Complexity : Patient may present with comorbidities that affect occupational performnce.  Miniml to moderate modification of tasks or assistance (eg, physical or verbal ) with assesment(s) is necessary to enable patient to complete evaluation   Overall Complexity Rating: MEDIUM    Patient would benefit from OT/Hand therapy services for the following problems:  Problem List: Pain effecting function, Decreased range of motion, Decreased strength and Edema effecting function     Treatment Plan may include any combination of the following: Therapeutic exercise, Physical agent/modality, Manual therapy and Patient education    Patient / Family readiness to learn indicated by: asking questions, trying to perform skills and interest    Persons(s) to be included in education:   patient (P)    Barriers to Learning/Limitations: None    Patient Goal (s): reduce the pain.     Patient Self Reported Health Status: good    Rehabilitation Potential: good    Short Term Goals: To be accomplished in 2  weeks:  Goal:* Patient will be compliant with initial home exercise program to take an active role in their rehabilitation process. Status at Eval:    Goal:* Patient will demonstrate a good understanding of their condition and strategies for self-management. Status at Eval:    Long Term Goals: To be accomplished in 4 weeks:   Goal:* Patient will have 65 degrees of right wrist extension in order to increase ease with ADL's such as bathing, eating and dressing and to eventually bear weight thru the right upper extremity as in pushing up from a chair. Status at eval: 48     Goal:* Pt will have 25 pounds of  in the right hand to allow for functional grasp for all ADL activities including dressing, bathing and self care. At Eval: 15    Goal:* Patient will show a 10 point improvement on FOTO functional status measure to improve overall functional performance. Status at Eval: 40    Frequency / Duration: Patient to be seen 1-2 times per week for 4 weeks:    Patient/ Caregiver education and instruction: Diagnosis, prognosis, self care and exercises    Functional Status Measure:  Patient's:40  FOTO Benchmark: 44  Expected Change: 23  FOTO score based on 0 - 100 point scale, with 100 being no impairment.  These scores are determined by patient reported functional assessments compared against national benchmarked data.     Carry   Current  CL= 60-79%   [id number] Goal  CJ= 20-39%      The severity rating is based on clinical judgment and the FOTO score.     Certification Period: 11/21/17-1/16/17    Italo Matthew OT, CHT, CLT 11/21/2017 3:52 PM

## 2017-11-21 NOTE — PROGRESS NOTES
[facility]  [address] [facility] [address]  [place], [address]  [phone] fax    Plan of Care/Statement of Necessity for Occupational Therapy Services    Patient name: Nehal Mtz Start of Care: 2017   Referral source: Jonah Walker MD : 1917    Medical Diagnosis: Pain in right wrist [M25.531]   Onset Date:2 months    Treatment Diagnosis: right wrist pain   Prior Hospitalization: see medical history Provider#: [id number]   Medications: Verified on Patient summary List    Comorbidities: Heart disease, osteoporosis, diabetes, arthritis, HTN, visual impairment   Prior Level of Function: Independent without limitations in ADL and IADL activities. The Plan of Care and following information is based on the information from the initial evaluation. Assessment/ key information: Patient is a 80 y.o. female with a chief complaint  of swelling and mild pain in the right wrist since September. Patient reports that she was weeding in her flowerbed and \"sprained\" her wrist and hand. She reports that the pain and swelling began 2 days after the sprain. MD places her in a prefabricated splint for the wrist for 3-4 weeks and she has stopped wearing that per MD ok. She has mild swelling and pain at this time. She is now able to weight bear in the UE when transfering sit to stand. Patient received an initial evaluation today followed by education as to diagnosis, precautions and treatment plan. Patient was provided with a basic home exercise program including A/PROM.     Evaluation Complexity: History MEDIUM Complexity : Expanded review of history including physical, cognitive and psychosocial  history  Examination MEDIUM Complexity : 3-5 performance deficits relating to physical, cognitive , or psychosocial skils that result in activity limitations and / or participation restrictions Clinical Decision Making MEDIUM Complexity : Patient may present with comorbidities that affect occupational performnce. Miniml to moderate modification of tasks or assistance (eg, physical or verbal ) with assesment(s) is necessary to enable patient to complete evaluation   Overall Complexity Rating: MEDIUM    Patient would benefit from OT/Hand therapy services for the following problems:  Problem List: Pain effecting function, Decreased range of motion, Decreased strength and Edema effecting function     Treatment Plan may include any combination of the following: Therapeutic exercise, Physical agent/modality, Manual therapy and Patient education    Patient / Family readiness to learn indicated by: asking questions, trying to perform skills and interest    Persons(s) to be included in education:   patient (P)    Barriers to Learning/Limitations: None    Patient Goal (s): reduce the pain.     Patient Self Reported Health Status: good    Rehabilitation Potential: good    Short Term Goals: To be accomplished in 2  weeks:  Goal:* Patient will be compliant with initial home exercise program to take an active role in their rehabilitation process. Status at Eval:    Goal:* Patient will demonstrate a good understanding of their condition and strategies for self-management. Status at Eval:    Long Term Goals: To be accomplished in 4 weeks:   Goal:* Patient will have 65 degrees of right wrist extension in order to increase ease with ADL's such as bathing, eating and dressing and to eventually bear weight thru the right upper extremity as in pushing up from a chair. Status at eval: 48     Goal:* Pt will have 25 pounds of  in the right hand to allow for functional grasp for all ADL activities including dressing, bathing and self care. At Eval: 15    Goal:* Patient will show a 10 point improvement on FOTO functional status measure to improve overall functional performance.   Status at Eval: 40    Frequency / Duration: Patient to be seen 1-2 times per week for 4 weeks:    Patient/ Caregiver education and instruction: Diagnosis, prognosis, self care and exercises    Functional Status Measure:  Patient's:40  FOTO Benchmark: 44  Expected Change: 23  FOTO score based on 0 - 100 point scale, with 100 being no impairment. These scores are determined by patient reported functional assessments compared against national benchmarked data.     Carry   Current  CL= 60-79%    Goal  CJ= 20-39%      The severity rating is based on clinical judgment and the FOTO score. Certification Period: 11/21/17-1/16/17    Welford Schlatter, OT, CHT, CLT 11/21/2017 3:52 PM    ________________________________________________________________________    I certify that the above Therapy Services are being furnished while the patient is under my care. I agree with the treatment plan and certify that this therapy is necessary.     Physician's Signature:____________________  Date:____________Time:__________    Please sign and return to [facility] [address] [place] [address] & [facility] [address]  [place], [address]  [phone] fax

## 2017-11-28 ENCOUNTER — HOSPITAL ENCOUNTER (OUTPATIENT)
Dept: PHYSICAL THERAPY | Age: 82
Discharge: HOME | End: 2017-11-28
Payer: MEDICARE

## 2017-11-28 ENCOUNTER — OFFICE VISIT (OUTPATIENT)
Dept: ORTHOPEDIC SURGERY | Age: 82
End: 2017-11-28

## 2017-11-28 VITALS
DIASTOLIC BLOOD PRESSURE: 78 MMHG | HEIGHT: 63 IN | SYSTOLIC BLOOD PRESSURE: 143 MMHG | BODY MASS INDEX: 24.41 KG/M2 | HEART RATE: 60 BPM | WEIGHT: 137.8 LBS | TEMPERATURE: 97.6 F | OXYGEN SATURATION: 98 % | RESPIRATION RATE: 18 BRPM

## 2017-11-28 DIAGNOSIS — M19.041 PRIMARY OSTEOARTHRITIS OF RIGHT HAND: Primary | ICD-10-CM

## 2017-11-28 DIAGNOSIS — M25.531 RIGHT WRIST PAIN: ICD-10-CM

## 2017-11-28 PROCEDURE — 97110 THERAPEUTIC EXERCISES: CPT

## 2017-11-28 NOTE — PROGRESS NOTES
OT DAILY TREATMENT NOTE - [facility] 3-16    Patient Name: Pierre Wyman  Date:2017  : 1917  [x]  Patient  Verified  [place] / Plan: [place] MED[place]ARE PART A & B / Product Type: Medicare /    In time:940  Out time:1020  Total Treatment Time (min): 40  Total Timed Codes (min): 25  1:1 Treatment Time ([address] only): 40   Visit #: 2 of 8    Treatment Area: Pain in right wrist [M25.531]    SUBJECTIVE  Pain Level (0-10 scale): 2-3/10  Any medication changes, allergies to medications, adverse drug reactions, diagnosis change, or new procedure performed?: [x] No    [] Yes (see summary sheet for update)  Subjective functional status/changes:   [] No changes reported  The swelling has gone done a lot.      OBJECTIVE     Modality rationale: decrease inflammation, decrease pain and increase tissue extensibility to improve the patients ability to grpi   Min Type Additional Details    [] Estim:  []Unatt       []IFC  []Premod                        []Other:  []w/ice   []w/heat  Position:  Location:    [] Estim: []Att    []TENS instruct  []NMES                    []Other:  []w/US   []w/ice   []w/heat  Position:  Location:    []  Traction: [] Cervical       []Lumbar                       [] Prone          []Supine                       []Intermittent   []Continuous Lbs:  [] before manual  [] after manual    []  Ultrasound: []Continuous   [] Pulsed                           []1MHz   []3MHz Location:  W/cm2:    []  Iontophoresis with dexamethasone         Location: [] Take home patch   [] In clinic   10 []  Ice     [x]  heat  []  Ice massage  []  Laser   []  Anodyne Position:  Location: R Hand    []  Laser with stim  []  Other: Position:  Location:    []  Vasopneumatic Device Pressure:       [] lo [] med [] hi   Temperature: [] lo [] med [] hi   [x] Skin assessment post-treatment:  [x]intact []redness- no adverse reaction    []redness - adverse reaction:   25 min Therapeutic Exercise:  [] See flow sheet :   Rationale: increase ROM and increase strength to improve the patients ability to , pinch    HEP Review: 10x each  Wrist Maze: 5x  Dexterity Balls: 5 min  Ball Roll-Flexion/Extension: 20x with hold      With   [] TE   [] TA   [] neuro   [] other: Patient Education: [x] Review HEP    [] Progressed/Changed HEP based on:   [] positioning   [] body mechanics   [] transfers   [] heat/ice application   [] Splint wear/care   [] Sensory re-education   [] scar management      [] other:            Other Objective/Functional Measures: Range of Motion:   Wrist Active/Passive ROM  Wrist 11/21/17  R    L         Flex 55 55         Ext 48 65         UD 35           RD 15                            Strength:  Measurements: Taken with Henri Dynamometer, in Lbs   Level 2 11/21/17 Date Date Date Date Date Date   Right 15               Left 30               Deficit 50%               Change                    Sensation:  No problems reported     Edema: GIRTH CHART measured in cm  Date: 11/21/17     Side R L   DPC circum. 19 19   Wrist Crease 17 16.5                           Pain Level (0-10 scale) post treatment: 1/10    ASSESSMENT/Changes in Function: Pain and swelling decrease. Pt requires verbal and physical cueing for HEP. Patient will continue to benefit from skilled OT services to modify and progress therapeutic interventions, address ROM deficits, address strength deficits, analyze and address soft tissue restrictions and instruct in home and community integration to attain remaining goals. []  See Plan of Care  []  See progress note/recertification  []  See Discharge Summary         Progress towards goals / Updated goals:  Short Term Goals: To be accomplished in 2  weeks:  Goal:* Patient will be compliant with initial home exercise program to take an active role in their rehabilitation process.   Status at al:  Status as Last Note: Pt requiring verbal and physical cueing for HEP.     Goal:* Patient will demonstrate a good understanding of their condition and strategies for self-management. Status at Eval:  Status as Last Note: Pt is attempting to perform HEP and has current strategy for pain relief      Long Term Goals: To be accomplished in 4 weeks:                        Goal:* Patient will have 65 degrees of right wrist extension in order to increase ease with ADL's such as bathing, eating and dressing and to eventually bear weight thru the right upper extremity as in pushing up from a chair. Status at eval: 48      Goal:* Pt will have 25 pounds of  in the right hand to allow for functional grasp for all ADL activities including dressing, bathing and self care. At Eval: 15     Goal:* Patient will show a 10 point improvement on FOTO functional status measure to improve overall functional performance.   Status at Eval: 40    PLAN  []  Upgrade activities as tolerated     []  Continue plan of care  []  Update interventions per flow sheet       []  Discharge due to:_  []  Other:_      INES Armstrong/L 11/28/2017  9:44 AM    Future Appointments  Date Time Provider [place] Car[place]   11/28/2017 3:20 PM Israel Chavez MD [place] [id number]   12/6/2017 3:00 PM Karli Escobar [facility]   12/13/2017 3:00 PM [facility]   12/21/2017 2:30 PM [facility]   11/13/2018 1:40 PM Cuauhtemoc Murray MD [address] [place] [address]

## 2017-11-28 NOTE — PROGRESS NOTES
AMBULATORY PROGRESS NOTE      Patient: Ruth Angel             MRN: [id number]     SSN: [id number] Body mass index is 24.41 kg/(m^2). YOB: 1917             AGE: 80 y.o. SEX: female    PCP: Leeanne Issa MD    IMPRESSION/DIAGNOSIS AND TREATMENT PLAN     DIAGNOSES  1. Primary osteoarthritis of right hand    2. Right wrist pain        No orders of the defined types were placed in this encounter. Ruth Angel understands her diagnoses and the proposed plan. Plan:      Progress activity as tolerated  Use over the counter topical Biofreeze or Asper Cream with Lidocaine as needed as directed  Follow up as needed      HPI AND EXAMINATION     Kaylen Zuleta IS A 80 y.o. female who presents to my outpatient office for follow up of hand and wrist pain and right hand OA. At last visit, Dr. Regi Chamorro recommended the following: The patient presents to the office today for follow up for right wrist. Overall, she is doing much better than she had been doing in the past.  She has no pain and increased ROM. Cellulitis has resovled.         Cardiovascular/Peripheral Vascular: Normal Pulses to each hand and foot. Musculoskeletal:  LOCATION: right hand      HAND, WRIST, FOREARM:  right    Integumentary: No rashes, skin patches, wounds, blisters, or abrasions                         Warm and normal color. No regions of expressible drainage       Deformities:  Is not present       Swelling: Regions of abnormal swelling : None to right hand/wrist       Tenderness: There is regions of tenderness: Slight. .. Along the right 5th MCP region.  She can flex and extend fingers and wrist.        Motor/Strength/Tone Exam: normal 5/5 strength in all tested muscle groups       Sensory Exam:  no sensory deficits noted       Stability Testing: NONE       ROM: full range of motion        Contractures:  None to affected region         Vascular : Normal capillary refill and digital coloration                        No embolic phenomena to the toes or hands                        Edema is not present         Neuro Exam:  Sensation : no focal                                  Motor function: WNL    CHART REVIEW     Past Medical History:   Diagnosis Date    Cardiac echocardiogram 03/21/2008    EF 60-65%. No RWMA. RVSP 35 mmHg. Mild AI. Mild MR. Mild TR. Mild PI.  Cardiac nuclear imaging test 03/29/2004    A-fib. No evidence of ischemia or prior infarction. EF 59%. No WMA.  Cardiovascular lower extremity venous duplex 10/05/2011    No deep vein or superficial thrombosis bilaterally. Bilateral calf veins w/calcific changes.  Diabetes ([address] 04/2006    Essential hypertension, benign     GERD (gastroesophageal reflux disease)     Hypercholesterolemia     Hypertension     Long term (current) use of anticoagulants 3/14/2011    Paroxysmal atrial fibrillation (HCC)     S/P total hysterectomy late 1960's     Current Outpatient Prescriptions   Medication Sig    metoprolol succinate (TOPROL-XL) 25 mg XL tablet TAKE 1 TABLET DAILY    lansoprazole (PREVACID) 30 mg capsule Take 1 Cap by mouth Daily (before breakfast).  ibuprofen (MOTRIN) 400 mg tablet Take 1 Tab by mouth every six (6) hours as needed for Pain.  terazosin (HYTRIN) 5 mg capsule Take 1 Cap by mouth nightly.  digoxin (LANOXIN) 0.125 mg tablet Take 1 Tab by mouth daily.  chlorthalidone (HYGROTEN) 25 mg tablet Take 1 Tab by mouth daily.  gemfibrozil (LOPID) 600 mg tablet Take 1 Tab by mouth daily.  ACETAMINOPHEN (TYLENOL ARTHRITIS PAIN PO) Take  by mouth.  metoprolol succinate (TOPROL-XL) 25 mg XL tablet Take 1 Tab by mouth daily.  metFORMIN (GLUCOPHAGE) 500 mg tablet Take 1 Tab by mouth two (2) times daily (with meals).  mirabegron ER (MYRBETRIQ) 25 mg ER tablet Take 1 Tab by mouth daily.  potassium chloride (K-DUR, KLOR-CON) 20 mEq tablet Take 1 Tab by mouth daily.     lisinopril (PRINIVIL, ZESTRIL) 20 mg tablet Take 1 Tab by mouth daily.  aspirin 81 mg tablet Take 1 Tab by mouth daily.  glucose blood VI test strips (ACCU-CHEK COMPACT TEST) strip by Does Not Apply route. Use as directed    acetaminophen-codeine (TYLENOL-CODEINE #3) 300-30 mg per tablet Take 1 Tab by mouth every six (6) hours as needed for Pain. Max Daily Amount: 4 Tabs.  cephALEXin (KEFLEX) 500 mg capsule Take 1 Cap by mouth four (4) times daily. No current facility-administered medications for this visit. No Known Allergies  Past Surgical History:   Procedure Laterality Date    HX CATARACT REMOVAL      bilateral    HX HYSTERECTOMY  late 's     Social History     Occupational History    Not on file. Social History Main Topics    Smoking status: Never Smoker    Smokeless tobacco: Never Used    Alcohol use No    Drug use: No    Sexual activity: Not on file     Family History   Problem Relation Age of Onset    Hypertension Mother     Heart Failure Father       at age 80 from CHF    Diabetes Sister     Diabetes Brother        REVIEW OF SYSTEMS : 2017  ALL BELOW ARE Negative except : SEE HPI       Constitutional: Negative for fever, chills and weight loss. Neg Weigh Loss  Cardiovascular: Negative for chest pain, claudication and leg swelling. SOB, CHAPA   Gastrointestinal: Negative for  pain, N/V/D/C, Blood in stool or urine,dysuria, hematuria,        Incontinence, pelvic pain  Musculoskeletal: see HPI. Neurological: Negative for dizziness and weakness. Negative for headaches,Visual Changes, Confusion, Seizures,   Psychiatric/Behavioral: Negative for depression, memory loss and substance abuse. Extremities:  Negative for  hair changes, rash or skin lesion changes. Hematologic: Negative for Bleeding problems, bruising, pallor or swollen lymph nodes.   Peripheral Vascular: No calf pain, vascular vein tenderness to calf pain              No calf throbbing, posterior knee throbbing pain    DIAGNOSTIC IMAGING     No notes on file    Prosper Orozco PA-C

## 2017-11-28 NOTE — PROGRESS NOTES
AMBULATORY PROGRESS NOTE      Patient: Jamia Jean-Baptiste             MRN: [id number]     SSN: [id number] There is no height or weight on file to calculate BMI. YOB: 1917     AGE: 80 y.o. EX: female    PCP: Sheila Hermosillo MD    IMPRESSION/DIAGNOSIS AND TREATMENT PLAN     DIAGNOSES  No diagnosis found. No orders of the defined types were placed in this encounter. Jamia Jean-Baptiste understands her diagnoses and the proposed plan. Plan:    1)  2)    RTO     HPI AND EXAMINATION     Bhavna Zuleta IS A 80 y.o. female who presents to my outpatient office for follow up of hand and wrist pain. At last visit, I provided a referral for occupational therapy for a removable thermoplastic splint. The patient presents to the office today ***. Cardiovascular/Peripheral Vascular: Normal Pulses to each hand and foot  Musculoskeletal:  LOCATION: right hand      HAND, WRIST, FOREARM:  right    Integumentary: No rashes, skin patches, wounds, blisters, or abrasions                         Warm and normal color. No regions of expressible drainage       Deformities:  Is not present       Swelling: Regions of abnormal swelling : None to right hand/wrist       Tenderness: There is regions of tenderness: Slight. .. Along the right 5th MCP region. She can flex and extend fingers and wrist.        Motor/Strength/Tone Exam: normal 5/5 strength in all tested muscle groups       Sensory Exam:  no sensory deficits noted       Stability Testing: NONE       ROM: full range of motion        Contractures:  None to affected region         Vascular : Normal capillary refill and digital coloration                        No embolic phenomena to the toes or hands                        Edema is not present         Neuro Exam:  Sensation : no focal                                  Motor function: WNL  ***  CHART REVIEW     Past Medical History:   Diagnosis Date    Cardiac echocardiogram 03/21/2008    EF 60-65%.   No RWMA.  RVSP 35 mmHg. Mild AI. Mild MR. Mild TR. Mild PI.  Cardiac nuclear imaging test 03/29/2004    A-fib. No evidence of ischemia or prior infarction. EF 59%. No WMA.  Cardiovascular lower extremity venous duplex 10/05/2011    No deep vein or superficial thrombosis bilaterally. Bilateral calf veins w/calcific changes.  Diabetes ([address] 04/2006    Essential hypertension, benign     GERD (gastroesophageal reflux disease)     Hypercholesterolemia     Hypertension     Long term (current) use of anticoagulants 3/14/2011    Paroxysmal atrial fibrillation (HCC)     S/P total hysterectomy late 1960's     Current Outpatient Prescriptions   Medication Sig    metoprolol succinate (TOPROL-XL) 25 mg XL tablet TAKE 1 TABLET DAILY    lansoprazole (PREVACID) 30 mg capsule Take 1 Cap by mouth Daily (before breakfast).  acetaminophen-codeine (TYLENOL-CODEINE #3) 300-30 mg per tablet Take 1 Tab by mouth every six (6) hours as needed for Pain. Max Daily Amount: 4 Tabs.  ibuprofen (MOTRIN) 400 mg tablet Take 1 Tab by mouth every six (6) hours as needed for Pain.  cephALEXin (KEFLEX) 500 mg capsule Take 1 Cap by mouth four (4) times daily.  terazosin (HYTRIN) 5 mg capsule Take 1 Cap by mouth nightly.  digoxin (LANOXIN) 0.125 mg tablet Take 1 Tab by mouth daily.  chlorthalidone (HYGROTEN) 25 mg tablet Take 1 Tab by mouth daily.  gemfibrozil (LOPID) 600 mg tablet Take 1 Tab by mouth daily.  ACETAMINOPHEN (TYLENOL ARTHRITIS PAIN PO) Take  by mouth.  metoprolol succinate (TOPROL-XL) 25 mg XL tablet Take 1 Tab by mouth daily.  metFORMIN (GLUCOPHAGE) 500 mg tablet Take 1 Tab by mouth two (2) times daily (with meals).  mirabegron ER (MYRBETRIQ) 25 mg ER tablet Take 1 Tab by mouth daily.  potassium chloride (K-DUR, KLOR-CON) 20 mEq tablet Take 1 Tab by mouth daily.  lisinopril (PRINIVIL, ZESTRIL) 20 mg tablet Take 1 Tab by mouth daily.     aspirin 81 mg tablet Take 1 Tab by mouth daily.    glucose blood VI test strips (ACCU-CHEK COMPACT TEST) strip by Does Not Apply route. Use as directed     No current facility-administered medications for this visit. Not on File  Past Surgical History:   Procedure Laterality Date    HX CATARACT REMOVAL      bilateral    HX HYSTERECTOMY  late      Social History     Occupational History    Not on file. Social History Main Topics    Smoking status: Never Smoker    Smokeless tobacco: Never Used    Alcohol use No    Drug use: No    Sexual activity: Not on file     Family History   Problem Relation Age of Onset    Hypertension Mother     Heart Failure Father       at age 80 from CHF    Diabetes Sister     Diabetes Brother        REVIEW OF SYSTEMS : 2017  ALL BELOW ARE Negative except : SEE HPI       Constitutional: Negative for fever, chills and weight loss. Neg Weigh Loss  Cardiovascular: Negative for chest pain, claudication and leg swelling. SOB, CHAPA   Gastrointestinal: Negative for  pain, N/V/D/C, Blood in stool or urine,dysuria, hematuria,        Incontinence, pelvic pain  Musculoskeletal: see HPI. Neurological: Negative for dizziness and weakness. Negative for headaches,Visual Changes, Confusion, Seizures,   Psychiatric/Behavioral: Negative for depression, memory loss and substance abuse. Extremities:  Negative for  hair changes, rash or skin lesion changes. Hematologic: Negative for Bleeding problems, bruising, pallor or swollen lymph nodes. Peripheral Vascular: No calf pain, vascular vein tenderness to calf pain              No calf throbbing, posterior knee throbbing pain    DIAGNOSTIC IMAGING     No notes on file    Written by Michael Antonio, as dictated by Dayan Mallory MD. Dr. JAH, Dayan Mallory MD, confirm that all documentation is accurate.

## 2017-11-30 RX ORDER — LISINOPRIL 20 MG/1
TABLET ORAL
Qty: 90 TAB | Refills: 3 | Status: SHIPPED | OUTPATIENT
Start: 2017-11-30 | End: 2018-06-06

## 2017-12-28 NOTE — PROGRESS NOTES
[facility]   [address]  [place], [address]  [phone] fax    Occupational Therapy Discharge Summary    Patient name: Jaja Avitia Start of Care: 17   Referral source: Maria R Parmar MD : 1917   Medical/Treatment Diagnosis: Pain in right wrist [M25.531] Onset Date:2 months     Prior Hospitalization: see medical history Provider#: [id number]   Medications: Verified on Patient Summary List    Comorbidities: Heart disease, osteoporosis, diabetes, arthritis, HTN, visual impairment   Prior Level of Function: Independent without limitations in ADL and IADL activities. Visits from Start of Care: 2    Missed Visits: 4  Reporting Period : 17-17    Summary of Care:Patient failed to return for follow up visits, therefore goal not addressed/met, status unknown    G-Codes (GO)  Carry   Current  CL= 60-79%    Goal  CL= 60-79%   D/C  CJ= 20-39%      The severity rating is based on clinical judgment and the FOTO score.     ASSESSMENT/RECOMMENDATIONS:  [x]Discontinue therapy: []Patient has reached or is progressing toward set goals      [x]Patient is non-compliant or has abdicated      []Due to lack of appreciable progress towards set goals    Kofi Sr OT 2017 12:08 PM

## 2018-01-29 NOTE — TELEPHONE ENCOUNTER
Requested Prescriptions     Pending Prescriptions Disp Refills    potassium chloride (K-DUR, KLOR-CON) 20 mEq tablet 90 Tab 3     Sig: Take 1 Tab by mouth daily.

## 2018-02-01 RX ORDER — POTASSIUM CHLORIDE 20 MEQ/1
20 TABLET, EXTENDED RELEASE ORAL DAILY
Qty: 90 TAB | Refills: 3 | Status: SHIPPED | OUTPATIENT
Start: 2018-02-01 | End: 2019-01-16 | Stop reason: SDUPTHER

## 2018-02-14 DIAGNOSIS — K21.9 GASTROESOPHAGEAL REFLUX DISEASE, ESOPHAGITIS PRESENCE NOT SPECIFIED: ICD-10-CM

## 2018-02-14 RX ORDER — LANSOPRAZOLE 30 MG/1
CAPSULE, DELAYED RELEASE ORAL
Qty: 90 CAP | Refills: 0 | Status: SHIPPED | OUTPATIENT
Start: 2018-02-14 | End: 2018-05-21 | Stop reason: SDUPTHER

## 2018-03-08 RX ORDER — METFORMIN HYDROCHLORIDE 500 MG/1
500 TABLET ORAL 2 TIMES DAILY WITH MEALS
Qty: 180 TAB | Refills: 3 | Status: SHIPPED | OUTPATIENT
Start: 2018-03-08 | End: 2018-06-13 | Stop reason: SDUPTHER

## 2018-03-26 NOTE — TELEPHONE ENCOUNTER
Pt called in requesting refill of her   Requested Prescriptions     Pending Prescriptions Disp Refills    chlorthalidone (HYGROTEN) 25 mg tablet 90 Tab 3     Sig: Take 1 Tab by mouth daily. Juan J Pina

## 2018-03-28 RX ORDER — CHLORTHALIDONE 25 MG/1
25 TABLET ORAL DAILY
Qty: 90 TAB | Refills: 3 | Status: SHIPPED | OUTPATIENT
Start: 2018-03-28 | End: 2018-06-06

## 2018-04-11 RX ORDER — GEMFIBROZIL 600 MG/1
600 TABLET, FILM COATED ORAL DAILY
Qty: 90 TAB | Refills: 3 | Status: SHIPPED | OUTPATIENT
Start: 2018-04-11 | End: 2018-06-11 | Stop reason: SDUPTHER

## 2018-04-13 ENCOUNTER — HOSPITAL ENCOUNTER (EMERGENCY)
Age: 83
Discharge: HOME | End: 2018-04-13
Attending: EMERGENCY MEDICINE | Admitting: EMERGENCY MEDICINE
Payer: MEDICARE

## 2018-04-13 ENCOUNTER — APPOINTMENT (OUTPATIENT)
Dept: GENERAL RADIOLOGY | Age: 83
End: 2018-04-13
Attending: EMERGENCY MEDICINE
Payer: MEDICARE

## 2018-04-13 VITALS
TEMPERATURE: 98.1 F | SYSTOLIC BLOOD PRESSURE: 121 MMHG | DIASTOLIC BLOOD PRESSURE: 70 MMHG | RESPIRATION RATE: 18 BRPM | OXYGEN SATURATION: 95 % | BODY MASS INDEX: 22.85 KG/M2 | HEART RATE: 74 BPM | WEIGHT: 129 LBS

## 2018-04-13 DIAGNOSIS — M54.31 SCIATICA OF RIGHT SIDE: Primary | ICD-10-CM

## 2018-04-13 LAB
BASOPHILS # BLD: 0 K/UL (ref 0–0.06)
BASOPHILS NFR BLD: 0 % (ref 0–2)
D DIMER PPP FEU-MCNC: 1.61 UG/ML(FEU)
DIFFERENTIAL METHOD BLD: ABNORMAL
EOSINOPHIL # BLD: 0 K/UL (ref 0–0.4)
EOSINOPHIL NFR BLD: 0 % (ref 0–5)
ERYTHROCYTE [DISTWIDTH] IN BLOOD BY AUTOMATED COUNT: 14.7 % (ref 11.6–14.5)
HCT VFR BLD AUTO: 34.9 % (ref 35–45)
HGB BLD-MCNC: 10.9 G/DL (ref 12–16)
LYMPHOCYTES # BLD: 0.8 K/UL (ref 0.9–3.6)
LYMPHOCYTES NFR BLD: 9 % (ref 21–52)
MCH RBC QN AUTO: 26.8 PG (ref 24–34)
MCHC RBC AUTO-ENTMCNC: 31.2 G/DL (ref 31–37)
MCV RBC AUTO: 85.7 FL (ref 74–97)
MONOCYTES # BLD: 0.6 K/UL (ref 0.05–1.2)
MONOCYTES NFR BLD: 7 % (ref 3–10)
NEUTS SEG # BLD: 7.2 K/UL (ref 1.8–8)
NEUTS SEG NFR BLD: 84 % (ref 40–73)
PLATELET # BLD AUTO: 271 K/UL (ref 135–420)
PMV BLD AUTO: 10.4 FL (ref 9.2–11.8)
RBC # BLD AUTO: 4.07 M/UL (ref 4.2–5.3)
WBC # BLD AUTO: 8.5 K/UL (ref 4.6–13.2)

## 2018-04-13 PROCEDURE — 73502 X-RAY EXAM HIP UNI 2-3 VIEWS: CPT

## 2018-04-13 PROCEDURE — 74011250637 HC RX REV CODE- 250/637: Performed by: EMERGENCY MEDICINE

## 2018-04-13 PROCEDURE — 99283 EMERGENCY DEPT VISIT LOW MDM: CPT

## 2018-04-13 PROCEDURE — 93971 EXTREMITY STUDY: CPT

## 2018-04-13 PROCEDURE — 85379 FIBRIN DEGRADATION QUANT: CPT | Performed by: EMERGENCY MEDICINE

## 2018-04-13 PROCEDURE — 85025 COMPLETE CBC W/AUTO DIFF WBC: CPT | Performed by: EMERGENCY MEDICINE

## 2018-04-13 RX ORDER — NAPROXEN 500 MG/1
500 TABLET ORAL 2 TIMES DAILY WITH MEALS
Qty: 5 TAB | Refills: 0 | Status: SHIPPED | OUTPATIENT
Start: 2018-04-13 | End: 2018-04-19 | Stop reason: SDUPTHER

## 2018-04-13 RX ORDER — HYDROCODONE BITARTRATE AND ACETAMINOPHEN 5; 325 MG/1; MG/1
TABLET ORAL
Qty: 10 TAB | Refills: 0 | Status: SHIPPED | OUTPATIENT
Start: 2018-04-13 | End: 2018-06-06

## 2018-04-13 RX ORDER — CYCLOBENZAPRINE HCL 5 MG
10 TABLET ORAL
Qty: 9 TAB | Refills: 0 | Status: SHIPPED | OUTPATIENT
Start: 2018-04-13 | End: 2018-04-19 | Stop reason: ALTCHOICE

## 2018-04-13 RX ORDER — HYDROCODONE BITARTRATE AND ACETAMINOPHEN 5; 325 MG/1; MG/1
1 TABLET ORAL
Status: COMPLETED | OUTPATIENT
Start: 2018-04-13 | End: 2018-04-13

## 2018-04-13 RX ORDER — CYCLOBENZAPRINE HCL 10 MG
10 TABLET ORAL
Status: COMPLETED | OUTPATIENT
Start: 2018-04-13 | End: 2018-04-13

## 2018-04-13 RX ADMIN — HYDROCODONE BITARTRATE AND ACETAMINOPHEN 1 TABLET: 5; 325 TABLET ORAL at 13:41

## 2018-04-13 RX ADMIN — CYCLOBENZAPRINE HYDROCHLORIDE 10 MG: 10 TABLET, FILM COATED ORAL at 15:34

## 2018-04-13 RX ADMIN — HYDROCODONE BITARTRATE AND ACETAMINOPHEN 1 TABLET: 5; 325 TABLET ORAL at 15:34

## 2018-04-13 NOTE — PROCEDURES
[facility]  *** FINAL REPORT ***    Name: Luanne Zaman  MRN: [id number]  : 05 Sep 1917  HIS Order #: [id number]  [id number] [facility] Visit #: [id number]  Date: 2018    TYPE OF TEST: Peripheral Venous Testing    REASON FOR TEST  Limb swelling    Right Leg:-  Deep venous thrombosis:           No  Superficial venous thrombosis:    No  Deep venous insufficiency:        Not examined  Superficial venous insufficiency: Not examined      INTERPRETATION/FINDINGS  Duplex images were obtained using 2-D gray scale, color flow, and  spectral Doppler analysis. Right leg :  1. Deep vein(s) visualized include the common femoral, proximal  femoral, mid femoral, distal femoral, popliteal(above knee),  popliteal(fossa), popliteal(below knee), posterior tibial and peroneal   veins. 2. No evidence of deep venous thrombosis detected in the veins  visualized. 3. No evidence of deep vein thrombosis in the contralateral common  femoral vein. 4. Superficial vein(s) visualized include the great saphenous vein. 5. No evidence of superficial thrombosis detected. ADDITIONAL COMMENTS    I have personally reviewed the data relevant to the interpretation of  this  study. TECHNOLOGIST: Emily Huynh, [facility], RVT/  Signed: 2018 02:35 PM    PHYSICIAN: Elissa Mccollum MD  Signed: 2018 07:38 AM

## 2018-04-13 NOTE — ED PROVIDER NOTES
EMERGENCY DEPARTMENT HISTORY AND PHYSICAL EXAM    1:05 PM      Date: 4/13/2018  Patient Name: Edgar Mccall    History of Presenting Illness     Chief Complaint   Patient presents with    Hip Pain    Leg Pain    Leg Swelling         History Provided By: Patient and Patient's Family    Chief Complaint: Hip Pain; Leg Pain; Leg Swelling   Duration:  Days  Timing:  Constant and Worsening  Location: Right hip radiating down right leg   Quality: N/A  Severity: N/A  Modifying Factors: Exacerbated by weight bearing; No relief with pain medication  Associated Symptoms: denies any other associated signs or symptoms      Additional History (Context): Edgar Mccall is a 80 y.o. female with PMHx of HTN, diabetes, and CHF presenting to the ED c/o constant right hip pain and right leg swelling for the past 5 days. States pain worsened yesterday and is exacerbated by weight bearing. Notes pain radiates down her right leg. States she took unknown pain medication last night with no relief. Per family, pt has PMHx of CHF. Pt notes she usually takes chlorthalidone but has been out of it for 6 days. Family states pt normally walks with a cane. Denies fall or trauma, fever, and any other symptoms or complaints. PCP: Taylor Wasserman MD    Current Outpatient Prescriptions   Medication Sig Dispense Refill    cyclobenzaprine (FLEXERIL) 5 mg tablet Take 2 Tabs by mouth three (3) times daily (with meals). 9 Tab 0    HYDROcodone-acetaminophen (NORCO) 5-325 mg per tablet Take 1-2 tablets PO every 4-6 hours as needed for pain control. If over the counter ibuprofen or acetaminophen was suggested, then only take the vicodin for pain not well controlled with the over the counter medication. 10 Tab 0    naproxen (NAPROSYN) 500 mg tablet Take 1 Tab by mouth two (2) times daily (with meals). 5 Tab 0    gemfibrozil (LOPID) 600 mg tablet Take 1 Tab by mouth daily.  90 Tab 3    chlorthalidone (HYGROTEN) 25 mg tablet Take 1 Tab by mouth daily. 90 Tab 3    metFORMIN (GLUCOPHAGE) 500 mg tablet Take 1 Tab by mouth two (2) times daily (with meals). 180 Tab 3    lansoprazole (PREVACID) 30 mg capsule TAKE 1 CAP BY MOUTH DAILY (BEFORE BREAKFAST). 90 Cap 0    potassium chloride (K-DUR, KLOR-CON) 20 mEq tablet Take 1 Tab by mouth daily. 90 Tab 3    lisinopril (PRINIVIL, ZESTRIL) 20 mg tablet TAKE 1 TABLET DAILY 90 Tab 3    terazosin (HYTRIN) 5 mg capsule Take 1 Cap by mouth nightly. 90 Cap 3    digoxin (LANOXIN) 0.125 mg tablet Take 1 Tab by mouth daily. 90 Tab 3    ACETAMINOPHEN (TYLENOL ARTHRITIS PAIN PO) Take  by mouth.  metoprolol succinate (TOPROL-XL) 25 mg XL tablet Take 1 Tab by mouth daily. 90 Tab 3    aspirin 81 mg tablet Take 1 Tab by mouth daily. 1 Tab 0    metoprolol succinate (TOPROL-XL) 25 mg XL tablet TAKE 1 TABLET DAILY 90 Tab 3    glucose blood VI test strips (ACCU-CHEK COMPACT TEST) strip by Does Not Apply route. Use as directed 1 Package 11       Past History     Past Medical History:  Past Medical History:   Diagnosis Date    Cardiac echocardiogram 03/21/2008    EF 60-65%. No RWMA. RVSP 35 mmHg. Mild AI. Mild MR. Mild TR. Mild PI.  Cardiac nuclear imaging test 03/29/2004    A-fib. No evidence of ischemia or prior infarction. EF 59%. No WMA.  Cardiovascular lower extremity venous duplex 10/05/2011    No deep vein or superficial thrombosis bilaterally. Bilateral calf veins w/calcific changes.     Diabetes ([address] 04/2006    Essential hypertension, benign     GERD (gastroesophageal reflux disease)     Hypercholesterolemia     Hypertension     Long term (current) use of anticoagulants 3/14/2011    Paroxysmal atrial fibrillation (HCC)     S/P total hysterectomy late 1960's       Past Surgical History:  Past Surgical History:   Procedure Laterality Date    HX CATARACT REMOVAL      bilateral    HX HYSTERECTOMY  late 26's       Family History:  Family History   Problem Relation Age of Onset    Hypertension Mother     Heart Failure Father       at age 80 from CHF    Diabetes Sister     Diabetes Brother        Social History:  Social History   Substance Use Topics    Smoking status: Never Smoker    Smokeless tobacco: Never Used    Alcohol use No       Allergies:  No Known Allergies      Review of Systems       Review of Systems   Constitutional: Negative. Negative for chills, diaphoresis and fever. HENT: Negative. Negative for congestion, rhinorrhea and sore throat. Eyes: Negative. Negative for pain, discharge and redness. Respiratory: Negative. Negative for cough, chest tightness, shortness of breath and wheezing. Cardiovascular: Positive for leg swelling (right). Negative for chest pain. Gastrointestinal: Negative. Negative for abdominal pain, constipation, diarrhea, nausea and vomiting. Genitourinary: Negative. Negative for dysuria, flank pain, frequency, hematuria and urgency. Musculoskeletal: Positive for arthralgias (right hip radiating down right leg). Negative for back pain and neck pain. Skin: Negative. Negative for rash. Neurological: Negative. Negative for syncope, weakness, numbness and headaches. Psychiatric/Behavioral: Negative. All other systems reviewed and are negative. Physical Exam     Visit Vitals    /70    Pulse 74    Temp 98.1 °F (36.7 °C)    Resp 18    Wt 58.5 kg (129 lb)    SpO2 95%    BMI 22.85 kg/m2         Physical Exam   Constitutional: She is oriented to person, place, and time. She appears well-developed and well-nourished. Non-toxic appearance. She does not have a sickly appearance. She does not appear ill. No distress. HENT:   Head: Normocephalic and atraumatic. Mouth/Throat: Oropharynx is clear and moist. No oropharyngeal exudate. Eyes: Conjunctivae and EOM are normal. Pupils are equal, round, and reactive to light. No scleral icterus. Neck: Normal range of motion. Neck supple.  No hepatojugular reflux and no JVD present. No tracheal deviation present. No thyromegaly present. Cardiovascular: Normal rate, regular rhythm, S1 normal, S2 normal, normal heart sounds, intact distal pulses and normal pulses. Exam reveals no gallop, no S3 and no S4. No murmur heard. Pulses:       Radial pulses are 2+ on the right side, and 2+ on the left side. Dorsalis pedis pulses are 2+ on the right side, and 2+ on the left side. Pulmonary/Chest: Effort normal and breath sounds normal. No respiratory distress. She has no decreased breath sounds. She has no wheezes. She has no rhonchi. She has no rales. Abdominal: Soft. Normal appearance and bowel sounds are normal. She exhibits no distension and no mass. There is no hepatosplenomegaly. There is no tenderness. There is no rigidity, no rebound, no guarding, no CVA tenderness, no tenderness at McBurney's point and negative Kenny's sign. Musculoskeletal: Normal range of motion. Right hip: She exhibits tenderness. She exhibits normal range of motion, normal strength, no bony tenderness, no swelling, no crepitus, no deformity and no laceration. Lymphadenopathy:        Head (right side): No submental, no submandibular, no preauricular and no occipital adenopathy present. Head (left side): No submental, no submandibular, no preauricular and no occipital adenopathy present. She has no cervical adenopathy. Right: No supraclavicular adenopathy present. Left: No supraclavicular adenopathy present. Neurological: She is alert and oriented to person, place, and time. She has normal strength and normal reflexes. She is not disoriented. No cranial nerve deficit or sensory deficit. Coordination and gait normal. GCS eye subscore is 4. GCS verbal subscore is 5. GCS motor subscore is 6. Grossly intact    Skin: Skin is warm, dry and intact. No rash noted. She is not diaphoretic. Psychiatric: She has a normal mood and affect.  Her speech is normal and behavior is normal. Judgment and thought content normal. Cognition and memory are normal.   Nursing note and vitals reviewed. Diagnostic Study Results     Labs -  Recent Results (from the past 12 hour(s))   CBC WITH AUTOMATED DIFF    Collection Time: 04/13/18  1:40 PM   Result Value Ref Range    WBC 8.5 4.6 - 13.2 K/uL    RBC 4.07 (L) 4.20 - 5.30 M/uL    HGB 10.9 (L) 12.0 - 16.0 g/dL    HCT 34.9 (L) 35.0 - 45.0 %    MCV 85.7 74.0 - 97.0 FL    MCH 26.8 24.0 - 34.0 PG    MCHC 31.2 31.0 - 37.0 g/dL    RDW 14.7 (H) 11.6 - 14.5 %    PLATELET 809 225 - 536 K/uL    MPV 10.4 9.2 - 11.8 FL    NEUTROPHILS 84 (H) 40 - 73 %    LYMPHOCYTES 9 (L) 21 - 52 %    MONOCYTES 7 3 - 10 %    EOSINOPHILS 0 0 - 5 %    BASOPHILS 0 0 - 2 %    ABS. NEUTROPHILS 7.2 1.8 - 8.0 K/UL    ABS. LYMPHOCYTES 0.8 (L) 0.9 - 3.6 K/UL    ABS. MONOCYTES 0.6 0.05 - 1.2 K/UL    ABS. EOSINOPHILS 0.0 0.0 - 0.4 K/UL    ABS. BASOPHILS 0.0 0.0 - 0.06 K/UL    DF AUTOMATED     D DIMER    Collection Time: 04/13/18  1:40 PM   Result Value Ref Range    D DIMER 1.61 (H) <0.46 ug/ml(FEU)       Radiologic Studies -   XR HIP RT W OR WO PELV 2-3 VWS   Final Result   IMPRESSION:  No evidence of acute fracture. DUPLEX LOWER EXT VENOUS RIGHT   Final Result   Right Leg:-  Deep venous thrombosis:           No  Superficial venous thrombosis:    No  Deep venous insufficiency:        Not examined  Superficial venous insufficiency: Not examined        INTERPRETATION/FINDINGS  Duplex images were obtained using 2-D gray scale, color flow, and  spectral Doppler analysis. Right leg :  1. Deep vein(s) visualized include the common femoral, proximal  femoral, mid femoral, distal femoral, popliteal(above knee),  popliteal(fossa), popliteal(below knee), posterior tibial and peroneal   veins. 2. No evidence of deep venous thrombosis detected in the veins  visualized. 3. No evidence of deep vein thrombosis in the contralateral common  femoral vein.   4. Superficial vein(s) visualized include the great saphenous vein. 5. No evidence of superficial thrombosis detected. Medical Decision Making   I am the first provider for this patient. I reviewed the vital signs, available nursing notes, past medical history, past surgical history, family history and social history. Vital Signs-Reviewed the patient's vital signs. Records Reviewed: Nursing Notes and Old Medical Records (Time of Review: 1:05 PM)    ED Course: Progress Notes, Reevaluation, and Consults:  Labs essentially normal with the exception of hemoglobin of 10.9 and D-dimer of 1.61. Hip Right hip X-ray showed no acute fracture. Vascular study showed no clot. 1:05 PM 4/13/2018    Provider Notes (Medical Decision Making):  MDM  Number of Diagnoses or Management Options  Sciatica of right side:   Diagnosis management comments: Hip fracture  Sciatica  DVT      Diagnosis       I have reassessed the patient. Patient is feeling better and able to ambulate in ED. Patient will be prescribed Flexeril, Naprosyn and a short course of Naproxen. Patient was discharged in stable condition. Patient is to return to emergency department if any new or worsening condition. Clinical Impression:   1.  Sciatica of right side        Disposition: Discharged     Follow-up Information     Follow up With Details Comments Contact Info    Joaquin Moon MD Call in 2 days For follow up  [place] [address] [address] [place] [phone] [facility] EMERGENCY DEPT  As needed, If symptoms worsen [address] [facility]  [phone]           _______________________________    Attestations:  Scribe Attestation     Miranda Broderick acting as a scribe for and in the presence of [facility], DO      April 13, 2018 at 3:25 PM       Provider Attestation:      I personally performed the services described in the documentation, reviewed the documentation, as recorded by the scribe in my presence, and it accurately and completely records my words and actions.  April 13, 2018 at 3:25 PM - Alison Chacko,     _______________________________

## 2018-04-13 NOTE — DISCHARGE INSTRUCTIONS
Sciatica: Exercises  Your Care Instructions  Here are some examples of typical rehabilitation exercises for your condition. Start each exercise slowly. Ease off the exercise if you start to have pain. Your doctor or physical therapist will tell you when you can start these exercises and which ones will work best for you. When you are not being active, find a comfortable position for rest. Some people are comfortable on the floor or a medium-firm bed with a small pillow under their head and another under their knees. Some people prefer to lie on their side with a pillow between their knees. Don't stay in one position for too long. Take short walks (10 to 20 minutes) every 2 to 3 hours. Avoid slopes, hills, and stairs until you feel better. Walk only distances you can manage without pain, especially leg pain. How to do the exercises  Back stretches    1. Get down on your hands and knees on the floor. 2. Relax your head and allow it to droop. Round your back up toward the ceiling until you feel a nice stretch in your upper, middle, and lower back. Hold this stretch for as long as it feels comfortable, or about 15 to 30 seconds. 3. Return to the starting position with a flat back while you are on your hands and knees. 4. Let your back sway by pressing your stomach toward the floor. Lift your buttocks toward the ceiling. 5. Hold this position for 15 to 30 seconds. 6. Repeat 2 to 4 times. Follow-up care is a key part of your treatment and safety. Be sure to make and go to all appointments, and call your doctor if you are having problems. It's also a good idea to know your test results and keep a list of the medicines you take. Where can you learn more? Go to http://juju-karol.info/. Enter T997 in the search box to learn more about \"Sciatica: Exercises. \"  Current as of: March 21, 2017  Content Version: 11.4  © 6986-0981 Healthwise, Incorporated.  Care instructions adapted under license by [address] (which disclaims liability or warranty for this information). If you have questions about a medical condition or this instruction, always ask your healthcare professional. [address] any warranty or liability for your use of this information. Sciatica: Care Instructions  Your Care Instructions    Sciatica (say \"ylc-FG-zn-kuh\") is an irritation of one of the sciatic nerves, which come from the spinal cord in the lower back. The sciatic nerves and their branches extend down through the buttock to the foot. Sciatica can develop when an injured disc in the back presses against a spinal nerve root. Its main symptom is pain, numbness, or weakness that is often worse in the leg or foot than in the back. Sciatica often will improve and go away with time. Early treatment usually includes medicines and exercises to relieve pain. Follow-up care is a key part of your treatment and safety. Be sure to make and go to all appointments, and call your doctor if you are having problems. It's also a good idea to know your test results and keep a list of the medicines you take. How can you care for yourself at home? · Take pain medicines exactly as directed. ¨ If the doctor gave you a prescription medicine for pain, take it as prescribed. ¨ If you are not taking a prescription pain medicine, ask your doctor if you can take an over-the-counter medicine. · Use heat or ice to relieve pain. ¨ To apply heat, put a warm water bottle, heating pad set on low, or warm cloth on your back. Do not go to sleep with a heating pad on your skin. ¨ To use ice, put ice or a cold pack on the area for 10 to 20 minutes at a time. Put a thin cloth between the ice and your skin. · Avoid sitting if possible, unless it feels better than standing. · Alternate lying down with short walks. Increase your walking distance as you are able to without making your symptoms worse.   · Do not do anything that makes your symptoms worse. When should you call for help? Call 911 anytime you think you may need emergency care. For example, call if:  · You are unable to move a leg at all. Call your doctor now or seek immediate medical care if:  · You have new or worse symptoms in your legs or buttocks. Symptoms may include:  ¨ Numbness or tingling. ¨ Weakness. ¨ Pain. · You lose bladder or bowel control. Watch closely for changes in your health, and be sure to contact your doctor if:  · You are not getting better as expected. Where can you learn more? Go to http://juju-karol.info/. Enter [phone] in the search box to learn more about \"Sciatica: Care Instructions. \"  Current as of: March 21, 2017  Content Version: 11.4  © 2906-0440 Healthwise, Incorporated. Care instructions adapted under license by [employer] (which disclaims liability or warranty for this information). If you have questions about a medical condition or this instruction, always ask your healthcare professional. [place] [address] any warranty or liability for your use of this information.

## 2018-04-13 NOTE — ED NOTES
Amira Spivey is a 80 y.o. female that was discharged in stable condition. The patients diagnosis, condition and treatment were explained to  patient and aftercare instructions were given. The patient verbalized understanding. Patient armband removed and shredded.

## 2018-04-13 NOTE — ED TRIAGE NOTES
C/o right hip and thigh pain x 2-3 days. Denies any injury. Pt reports swelling to right leg as well. Took unknown prescription med for pain without relief from pain.

## 2018-04-13 NOTE — ED NOTES
Tried to ambulate patient with two person assist.  Patient was unable to bear weight on her right leg. Patient given flexil and nocor for pain after trying to ambulate. Will try to ambulate patient again in about fifteen to twenty minutes.

## 2018-04-16 ENCOUNTER — PATIENT OUTREACH (OUTPATIENT)
Dept: FAMILY MEDICINE CLINIC | Age: 83
End: 2018-04-16

## 2018-04-16 NOTE — PROGRESS NOTES
Attempted to reach patient or family member post [facility] ED visit 4/13/18. Left message on answering machine.  Will continue to follow

## 2018-04-17 ENCOUNTER — PATIENT OUTREACH (OUTPATIENT)
Dept: FAMILY MEDICINE CLINIC | Age: 83
End: 2018-04-17

## 2018-04-17 NOTE — PROGRESS NOTES
Hospital Discharge Follow-Up      Date/Time:  2018 11:18 AM    Patient was admitted to [facility] ED on   For:    Hip Pain    Leg Pain    Leg Swelling      . Patient was contacted within 2 business days of discharge. Top Challenges reviewed with the provider   Patient is 80yrs old and ambulates with rollator         Method of communication with provider :chart routing      Nurse Navigator (NN) contacted the family by telephone to perform post ED discharge assessment. Verified name and  with family as identifiers. Provided introduction to self, and explanation of the Nurse Navigator role. Reviewed discharge instructions and red flags with family who verbalized understanding. Family given an opportunity to ask questions and does not have any further questions or concerns at this time. The family agrees to contact the PCP office for questions related to their healthcare. NN provided contact information for future reference. Summary of patients top problems:  1. Chronic pain  Barriers to care? none noted    Advance Care Planning:   Does patient have an Advance Directive:  not on file       Medication:     New Medications at Discharge: Flexeril, Hydrocodone, and Naprosyn  Medication reconciliation was performed with family, who verbalizes understanding of administration of home medications. There were no barriers to obtaining medications identified at this time. Referral to Pharm D needed: no     Current Outpatient Prescriptions   Medication Sig    cyclobenzaprine (FLEXERIL) 5 mg tablet Take 2 Tabs by mouth three (3) times daily (with meals).  HYDROcodone-acetaminophen (NORCO) 5-325 mg per tablet Take 1-2 tablets PO every 4-6 hours as needed for pain control. If over the counter ibuprofen or acetaminophen was suggested, then only take the vicodin for pain not well controlled with the over the counter medication.     naproxen (NAPROSYN) 500 mg tablet Take 1 Tab by mouth two (2) times daily (with meals).  gemfibrozil (LOPID) 600 mg tablet Take 1 Tab by mouth daily.  chlorthalidone (HYGROTEN) 25 mg tablet Take 1 Tab by mouth daily.  metFORMIN (GLUCOPHAGE) 500 mg tablet Take 1 Tab by mouth two (2) times daily (with meals).  lansoprazole (PREVACID) 30 mg capsule TAKE 1 CAP BY MOUTH DAILY (BEFORE BREAKFAST).  potassium chloride (K-DUR, KLOR-CON) 20 mEq tablet Take 1 Tab by mouth daily.  lisinopril (PRINIVIL, ZESTRIL) 20 mg tablet TAKE 1 TABLET DAILY    terazosin (HYTRIN) 5 mg capsule Take 1 Cap by mouth nightly.  digoxin (LANOXIN) 0.125 mg tablet Take 1 Tab by mouth daily.  ACETAMINOPHEN (TYLENOL ARTHRITIS PAIN PO) Take  by mouth.  metoprolol succinate (TOPROL-XL) 25 mg XL tablet Take 1 Tab by mouth daily.  aspirin 81 mg tablet Take 1 Tab by mouth daily.  glucose blood VI test strips (ACCU-CHEK COMPACT TEST) strip by Does Not Apply route. Use as directed    metoprolol succinate (TOPROL-XL) 25 mg XL tablet TAKE 1 TABLET DAILY     No current facility-administered medications for this visit. There are no discontinued medications. PCP/Specialist follow up: Future Appointments  Date Time Provider [place]   4/19/2018 10:30 AM Emmanuel Andrade NP [facility]   11/13/2018 1:40 PM Dolores Arechiga MD [address] ED     Coordinate Pain Management Plan for Patient. Patient was given Naprosyn and Hydrocodone for pain in ED. Daughter states patient is doing much better. No complaints         Patient Is staying with family. Spoke with patient's daughter Shawanda Ruff. Family member states patient is up and walking with rollator. Patient has not complained of any pain since initiating meds prescribed in ED.  Will continue to follow

## 2018-04-19 ENCOUNTER — OFFICE VISIT (OUTPATIENT)
Dept: FAMILY MEDICINE CLINIC | Age: 83
End: 2018-04-19

## 2018-04-19 VITALS
DIASTOLIC BLOOD PRESSURE: 73 MMHG | RESPIRATION RATE: 18 BRPM | TEMPERATURE: 97.8 F | WEIGHT: 136 LBS | OXYGEN SATURATION: 98 % | HEART RATE: 59 BPM | BODY MASS INDEX: 24.1 KG/M2 | SYSTOLIC BLOOD PRESSURE: 154 MMHG | HEIGHT: 63 IN

## 2018-04-19 DIAGNOSIS — M54.31 RIGHT SIDED SCIATICA: Primary | ICD-10-CM

## 2018-04-19 RX ORDER — METHOCARBAMOL 500 MG/1
500 TABLET, FILM COATED ORAL 3 TIMES DAILY
Qty: 30 TAB | Refills: 0 | Status: SHIPPED | OUTPATIENT
Start: 2018-04-19 | End: 2018-06-11 | Stop reason: ALTCHOICE

## 2018-04-19 RX ORDER — NAPROXEN 500 MG/1
500 TABLET ORAL 2 TIMES DAILY WITH MEALS
Qty: 20 TAB | Refills: 0 | Status: SHIPPED | OUTPATIENT
Start: 2018-04-19 | End: 2018-06-06

## 2018-04-19 NOTE — MR AVS SNAPSHOT
Mino Wharton 
 
 
 [address] [address] [address] [address] 
[phone] Patient: Jean Claude Faith MRN: [id number] ONL:[birth year] Visit Information Date & Time Provider Department Dept. Phone Encounter #  
 4/19/2018 10:30 AM Krishna Mazariegos NP Millie Goodman [address] [id number] Your Appointments 11/13/2018  1:40 PM  
Follow Up with Massimo Watkins MD  
[facility] [address] [address] ([address] [place] [address]) Appt Note: 1 year follow up Pascual Ackerman [id number]  
[phone] [facility] [address] [facility] [address] Upcoming Health Maintenance Date Due HEMOGLOBIN A1C Q6M 8/14/2017 MICROALBUMIN Q1 2/14/2018 LIPID PANEL Q1 2/14/2018 FOOT EXAM Q1 3/6/2018 MEDICARE YEARLY EXAM 3/14/2018 Influenza Age 5 to Adult 9/19/2018* EYE EXAM RETINAL OR DILATED Q1 12/4/2018 GLAUCOMA SCREENING Q2Y 12/4/2019 DTaP/Tdap/Td series (2 - Td) 8/16/2026 *Topic was postponed. The date shown is not the original due date. Allergies as of 4/19/2018  Review Complete On: 4/19/2018 By: Krishna Mazariegos NP No Known Allergies Current Immunizations  Never Reviewed No immunizations on file. Not reviewed this visit You Were Diagnosed With   
  
 Codes Comments Right sided sciatica    -  Primary ICD-10-CM: M54.31 
ICD-9-CM: 724.3 Vitals BP Pulse Temp Resp Height(growth percentile) Weight(growth percentile) 154/73 (BP 1 Location: Left arm, BP Patient Position: Sitting) (!) 59 97.8 °F (36.6 °C) (Oral) 18 5' 3\" (1.6 m) 136 lb (61.7 kg) SpO2 BMI OB Status Smoking Status 98% 24.09 kg/m2 Hysterectomy Never Smoker BMI and BSA Data Body Mass Index Body Surface Area 24.09 kg/m 2 1.66 m 2 Preferred Pharmacy Pharmacy Name Phone [id number] [address] [facility] [phone] Your Updated Medication List  
  
   
This list is accurate as of 4/19/18 11:10 AM.  Always use your most recent med list.  
  
  
  
  
 aspirin 81 mg tablet Take 1 Tab by mouth daily. chlorthalidone 25 mg tablet Commonly known as:  Hilda Karmen Take 1 Tab by mouth daily. digoxin 0.125 mg tablet Commonly known as:  LANOXIN Take 1 Tab by mouth daily. gemfibrozil 600 mg tablet Commonly known as:  LOPID Take 1 Tab by mouth daily. glucose blood VI test strips strip Commonly known as:  ACCU-CHEK COMPACT TEST  
by Does Not Apply route. Use as directed HYDROcodone-acetaminophen 5-325 mg per tablet Commonly known as:  [address] Take 1-2 tablets PO every 4-6 hours as needed for pain control. If over the counter ibuprofen or acetaminophen was suggested, then only take the vicodin for pain not well controlled with the over the counter medication. lansoprazole 30 mg capsule Commonly known as:  PREVACID TAKE 1 CAP BY MOUTH DAILY (BEFORE BREAKFAST). lisinopril 20 mg tablet Commonly known as:  PRINIVIL, ZESTRIL  
TAKE 1 TABLET DAILY  
  
 metFORMIN 500 mg tablet Commonly known as:  GLUCOPHAGE Take 1 Tab by mouth two (2) times daily (with meals). methocarbamol 500 mg tablet Commonly known as:  ROBAXIN Take 1 Tab by mouth three (3) times daily. * metoprolol succinate 25 mg XL tablet Commonly known as:  TOPROL-XL Take 1 Tab by mouth daily. * metoprolol succinate 25 mg XL tablet Commonly known as:  TOPROL-XL  
TAKE 1 TABLET DAILY  
  
 naproxen 500 mg tablet Commonly known as:  NAPROSYN Take 1 Tab by mouth two (2) times daily (with meals). potassium chloride 20 mEq tablet Commonly known as:  K-DUR, KLOR-CON Take 1 Tab by mouth daily. terazosin 5 mg capsule Commonly known as:  HYTRIN Take 1 Cap by mouth nightly. TYLENOL ARTHRITIS PAIN PO Take  by mouth. * Notice: This list has 2 medication(s) that are the same as other medications prescribed for you. Read the directions carefully, and ask your doctor or other care provider to review them with you. Prescriptions Sent to Pharmacy Refills  
 naproxen (NAPROSYN) 500 mg tablet 0 Sig: Take 1 Tab by mouth two (2) times daily (with meals). Class: Normal  
 Pharmacy: [facility]/pharmacy [address] [place] [address], [facility],4[address] #: [phone] Route: Oral  
 methocarbamol (ROBAXIN) 500 mg tablet 0 Sig: Take 1 Tab by mouth three (3) times daily. Class: Normal  
 Pharmacy: [facility]/pharmacy [address] [place] [address], [facility],[address] [address] Ph #: [phone] Route: Oral  
  
Introducing [facility]! [employer] introduces [employer] patient portal. Now you can access parts of your medical record, email your doctor's office, and request medication refills online. 1. In your internet browser, go to [email or web address] 2. Click on the First Time User? Click Here link in the Sign In box. You will see the New Member Sign Up page. 3. Enter your [employer] Access Code exactly as it appears below. You will not need to use this code after youve completed the sign-up process. If you do not sign up before the expiration date, you must request a new code. · [employer] Access Code: [email or web address] Expires: 7/12/2018 12:35 PM 
 
4. Enter the last four digits of your Social Security Number (xxxx) and Date of Birth (mm/dd/yyyy) as indicated and click Submit. You will be taken to the next sign-up page. 5. Create a [employer] ID. This will be your [employer] login ID and cannot be changed, so think of one that is secure and easy to remember. 6. Create a [employer] password. You can change your password at any time. 7. Enter your Password Reset Question and Answer. This can be used at a later time if you forget your password. 8. Enter your e-mail address.  You will receive e-mail notification when 26-Feb-2018 13:29 new information is available in [employer]. 9. Click Sign Up. You can now view and download portions of your medical record. 10. Click the Download Summary menu link to download a portable copy of your medical information. If you have questions, please visit the Frequently Asked Questions section of the [employer] website. Remember, [employer] is NOT to be used for urgent needs. For medical emergencies, dial 911. Now available from your iPhone and Android! Please provide this summary of care documentation to your next provider. Your primary care clinician is listed as [address]. If you have any questions after today's visit, please call [phone].

## 2018-04-19 NOTE — PROGRESS NOTES
HISTORY OF PRESENT ILLNESS  Rukhsana White is a 80 y.o. female. Patient presents today for follow up ED visit. HPI   Pt was seen in ED on 4-13-18 for pain in her hip and leg. Chief Complaint   Hip Pain    Leg Pain    Leg Swelling    Radiologic Studies -   XR HIP RT W OR WO PELV 2-3 VWS   Final Result   IMPRESSION:  No evidence of acute fracture. She was given naproxsyn and flexeril and Norco.    She states this feel much improved. She can now sit in a comfortable position. But it is still  Little tender. She is able to pick her feet up now before she was shuffling. Review of Systems   Constitutional: Negative. Respiratory: Negative. Cardiovascular: Negative. Musculoskeletal: Positive for back pain (right sciatica, hip). Neurological: Negative. Visit Vitals    /73 (BP 1 Location: Left arm, BP Patient Position: Sitting)    Pulse (!) 59    Temp 97.8 °F (36.6 °C) (Oral)    Resp 18    Ht 5' 3\" (1.6 m)    Wt 136 lb (61.7 kg)    SpO2 98%    BMI 24.09 kg/m2       Physical Exam   Constitutional: She is oriented to person, place, and time. She appears well-developed. No distress. Cardiovascular: Normal rate, regular rhythm and normal heart sounds. Pulmonary/Chest: Effort normal and breath sounds normal. No respiratory distress. She has no wheezes. She has no rales. Musculoskeletal:        Lumbar back: She exhibits decreased range of motion, tenderness (right sciatic notch.) and spasm (right paraspinal process. ). Neurological: She is alert and oriented to person, place, and time. No cranial nerve deficit. Pt is using a walker. ASSESSMENT and PLAN    ICD-10-CM ICD-9-CM    1. Right sided sciatica M54.31 724.3 naproxen (NAPROSYN) 500 mg tablet     PLAN:  I reviewed the ED notes and imaging. Pt is using her walker well. Pt was given a refill on the naprosyn and robaxin. Her [place] was not refilled.     I recommend that she use a inflatable donut/ to sit on since she has bone prominences on her buttocks. Pt is to call with any concerns. She is staying with her niece who is with her at this visit. Pt was given after visit summary.

## 2018-04-19 NOTE — PROGRESS NOTES
Patient is in the office today for ED follow up. 1. Have you been to the ER, urgent care clinic since your last visit? Hospitalized since your last visit? Yes [facility] for sciatica right leg    2. Have you seen or consulted any other health care providers outside of the [address] [place] [address] since your last visit? Include any pap smears or colon screening.  No

## 2018-05-21 DIAGNOSIS — K21.9 GASTROESOPHAGEAL REFLUX DISEASE, ESOPHAGITIS PRESENCE NOT SPECIFIED: ICD-10-CM

## 2018-05-21 RX ORDER — GEMFIBROZIL 600 MG/1
600 TABLET, FILM COATED ORAL DAILY
Qty: 90 TAB | Refills: 0 | OUTPATIENT
Start: 2018-05-21

## 2018-05-21 RX ORDER — LANSOPRAZOLE 30 MG/1
CAPSULE, DELAYED RELEASE ORAL
Qty: 90 CAP | Refills: 0 | Status: SHIPPED | OUTPATIENT
Start: 2018-05-21 | End: 2018-06-11 | Stop reason: SDUPTHER

## 2018-05-21 RX ORDER — METOPROLOL SUCCINATE 25 MG/1
25 TABLET, EXTENDED RELEASE ORAL DAILY
Qty: 90 TAB | Refills: 0 | Status: SHIPPED | OUTPATIENT
Start: 2018-05-21 | End: 2018-06-11 | Stop reason: SDUPTHER

## 2018-05-21 NOTE — TELEPHONE ENCOUNTER
Called patient at [phone] (home)  (non-secure line) and did not leave a detailed message. Patient needs to be informed that request for refill on medication gemfibrozil is too soon. Dr. Katie Portillo sent medication refill to [address] [place] [address] on 04/11/2018 for gemfibrozil, take 1 tablet by mouth daily, dispense 90 tablets with 3 refills.

## 2018-05-21 NOTE — TELEPHONE ENCOUNTER
Requested Prescriptions     Pending Prescriptions Disp Refills    gemfibrozil (LOPID) 600 mg tablet 90 Tab 3     Sig: Take 1 Tab by mouth daily.  lansoprazole (PREVACID) 30 mg capsule 90 Cap 0    metoprolol succinate (TOPROL-XL) 25 mg XL tablet 90 Tab 3     Sig: Take 1 Tab by mouth daily.

## 2018-05-21 NOTE — TELEPHONE ENCOUNTER
Spoke with patient to inform that Dr. Gisel Salinas sent medication refill to [address] on 04/11/2018 for gemfibrozil, take 1 tablet by mouth daily, dispense 90 tablets with 3 refills. Patient verbalized understanding.

## 2018-05-22 DIAGNOSIS — K21.9 GASTROESOPHAGEAL REFLUX DISEASE, ESOPHAGITIS PRESENCE NOT SPECIFIED: ICD-10-CM

## 2018-05-23 RX ORDER — LANSOPRAZOLE 30 MG/1
CAPSULE, DELAYED RELEASE ORAL
Qty: 90 CAP | Refills: 0 | Status: SHIPPED | OUTPATIENT
Start: 2018-05-23 | End: 2018-06-06

## 2018-06-06 ENCOUNTER — TELEPHONE (OUTPATIENT)
Dept: FAMILY MEDICINE CLINIC | Age: 83
End: 2018-06-06

## 2018-06-06 ENCOUNTER — HOSPITAL ENCOUNTER (EMERGENCY)
Age: 83
Discharge: HOME | End: 2018-06-06
Attending: EMERGENCY MEDICINE
Payer: MEDICARE

## 2018-06-06 ENCOUNTER — APPOINTMENT (OUTPATIENT)
Dept: GENERAL RADIOLOGY | Age: 83
End: 2018-06-06
Attending: EMERGENCY MEDICINE
Payer: MEDICARE

## 2018-06-06 VITALS
WEIGHT: 139 LBS | RESPIRATION RATE: 17 BRPM | OXYGEN SATURATION: 100 % | SYSTOLIC BLOOD PRESSURE: 146 MMHG | HEART RATE: 58 BPM | BODY MASS INDEX: 24.62 KG/M2 | DIASTOLIC BLOOD PRESSURE: 71 MMHG | TEMPERATURE: 98.5 F

## 2018-06-06 DIAGNOSIS — R06.00 DYSPNEA, UNSPECIFIED TYPE: Primary | ICD-10-CM

## 2018-06-06 LAB
ANION GAP SERPL CALC-SCNC: 8 MMOL/L (ref 3–18)
BASOPHILS # BLD: 0 K/UL (ref 0–0.06)
BASOPHILS NFR BLD: 0 % (ref 0–2)
BUN SERPL-MCNC: 20 MG/DL (ref 7–18)
BUN/CREAT SERPL: 23 (ref 12–20)
CALCIUM SERPL-MCNC: 10 MG/DL (ref 8.5–10.1)
CHLORIDE SERPL-SCNC: 104 MMOL/L (ref 100–108)
CO2 SERPL-SCNC: 30 MMOL/L (ref 21–32)
CREAT SERPL-MCNC: 0.88 MG/DL (ref 0.6–1.3)
DIFFERENTIAL METHOD BLD: ABNORMAL
DIGOXIN SERPL-MCNC: 0.7 NG/ML (ref 0.9–2)
EOSINOPHIL # BLD: 0.2 K/UL (ref 0–0.4)
EOSINOPHIL NFR BLD: 3 % (ref 0–5)
ERYTHROCYTE [DISTWIDTH] IN BLOOD BY AUTOMATED COUNT: 15 % (ref 11.6–14.5)
GLUCOSE SERPL-MCNC: 116 MG/DL (ref 74–99)
HCT VFR BLD AUTO: 35.3 % (ref 35–45)
HGB BLD-MCNC: 10.7 G/DL (ref 12–16)
LYMPHOCYTES # BLD: 1.9 K/UL (ref 0.9–3.6)
LYMPHOCYTES NFR BLD: 30 % (ref 21–52)
MAGNESIUM SERPL-MCNC: 1.5 MG/DL (ref 1.6–2.6)
MCH RBC QN AUTO: 25.8 PG (ref 24–34)
MCHC RBC AUTO-ENTMCNC: 30.3 G/DL (ref 31–37)
MCV RBC AUTO: 85.3 FL (ref 74–97)
MONOCYTES # BLD: 0.5 K/UL (ref 0.05–1.2)
MONOCYTES NFR BLD: 8 % (ref 3–10)
NEUTS SEG # BLD: 3.7 K/UL (ref 1.8–8)
NEUTS SEG NFR BLD: 59 % (ref 40–73)
PLATELET # BLD AUTO: 257 K/UL (ref 135–420)
PMV BLD AUTO: 10.2 FL (ref 9.2–11.8)
POTASSIUM SERPL-SCNC: 3.7 MMOL/L (ref 3.5–5.5)
RBC # BLD AUTO: 4.14 M/UL (ref 4.2–5.3)
SODIUM SERPL-SCNC: 142 MMOL/L (ref 136–145)
TROPONIN I SERPL-MCNC: <0.02 NG/ML (ref 0–0.06)
WBC # BLD AUTO: 6.4 K/UL (ref 4.6–13.2)

## 2018-06-06 PROCEDURE — 99284 EMERGENCY DEPT VISIT MOD MDM: CPT

## 2018-06-06 PROCEDURE — 71046 X-RAY EXAM CHEST 2 VIEWS: CPT

## 2018-06-06 PROCEDURE — 84484 ASSAY OF TROPONIN QUANT: CPT | Performed by: EMERGENCY MEDICINE

## 2018-06-06 PROCEDURE — 85025 COMPLETE CBC W/AUTO DIFF WBC: CPT | Performed by: EMERGENCY MEDICINE

## 2018-06-06 PROCEDURE — 80048 BASIC METABOLIC PNL TOTAL CA: CPT | Performed by: EMERGENCY MEDICINE

## 2018-06-06 PROCEDURE — 80162 ASSAY OF DIGOXIN TOTAL: CPT | Performed by: EMERGENCY MEDICINE

## 2018-06-06 PROCEDURE — 93005 ELECTROCARDIOGRAM TRACING: CPT

## 2018-06-06 PROCEDURE — 83735 ASSAY OF MAGNESIUM: CPT | Performed by: EMERGENCY MEDICINE

## 2018-06-06 RX ORDER — CHLORTHALIDONE 25 MG/1
25 TABLET ORAL DAILY
Qty: 30 TAB | Refills: 0 | Status: SHIPPED | OUTPATIENT
Start: 2018-06-06 | End: 2018-06-11 | Stop reason: SDUPTHER

## 2018-06-06 RX ORDER — METOPROLOL SUCCINATE 25 MG/1
25 TABLET, EXTENDED RELEASE ORAL DAILY
Qty: 30 TAB | Refills: 0 | Status: SHIPPED | OUTPATIENT
Start: 2018-06-06 | End: 2018-06-11 | Stop reason: SDUPTHER

## 2018-06-06 RX ORDER — LISINOPRIL 20 MG/1
20 TABLET ORAL DAILY
Qty: 30 TAB | Refills: 0 | Status: SHIPPED | OUTPATIENT
Start: 2018-06-06 | End: 2018-06-11 | Stop reason: SDUPTHER

## 2018-06-06 RX ORDER — GEMFIBROZIL 600 MG/1
600 TABLET, FILM COATED ORAL DAILY
Qty: 30 TAB | Refills: 0 | Status: SHIPPED | OUTPATIENT
Start: 2018-06-06 | End: 2018-07-09 | Stop reason: SDUPTHER

## 2018-06-06 NOTE — ED TRIAGE NOTES
Patient states intermittent shortness of breath x 3 days. C/o swelling to right lower leg and foot x one week. Denies chest pain. States minor nasal drainage. Patient states needing refill on Gemfibrozil and Chlorthalidone.

## 2018-06-06 NOTE — ED NOTES
Current Discharge Medication List      START taking these medications    Details   !! gemfibrozil (LOPID) 600 mg tablet Take 1 Tab by mouth daily. Qty: 30 Tab, Refills: 0      !! metoprolol succinate (TOPROL-XL) 25 mg XL tablet Take 1 Tab by mouth daily. Qty: 30 Tab, Refills: 0       !! - Potential duplicate medications found. Please discuss with provider. CONTINUE these medications which have CHANGED    Details   !! chlorthalidone (HYGROTEN) 25 mg tablet Take 1 Tab by mouth daily. Qty: 30 Tab, Refills: 0      !! chlorthalidone (HYGROTEN) 25 mg tablet Take 1 Tab by mouth daily. Qty: 30 Tab, Refills: 0      lisinopril (PRINIVIL, ZESTRIL) 20 mg tablet Take 1 Tab by mouth daily. Qty: 30 Tab, Refills: 0       !! - Potential duplicate medications found. Please discuss with provider. CONTINUE these medications which have NOT CHANGED    Details   lansoprazole (PREVACID) 30 mg capsule TAKE 1 CAP BY MOUTH DAILY (BEFORE BREAKFAST). Patient needs an appointment before any future refills are given. Qty: 90 Cap, Refills: 0    Associated Diagnoses: Gastroesophageal reflux disease, esophagitis presence not specified      !! metoprolol succinate (TOPROL-XL) 25 mg XL tablet Take 1 Tab by mouth daily. Patient needs an appointment before any future refills are given. Qty: 90 Tab, Refills: 0      !! gemfibrozil (LOPID) 600 mg tablet Take 1 Tab by mouth daily. Qty: 90 Tab, Refills: 3       !! - Potential duplicate medications found. Please discuss with provider. Patient armband removed and shredded  Prescriptions given and reviewed with patient and family.

## 2018-06-06 NOTE — ED PROVIDER NOTES
EMERGENCY DEPARTMENT HISTORY AND PHYSICAL EXAM    4:42 PM      Date: 6/6/2018  Patient Name: Colby Paz    History of Presenting Illness     Chief Complaint   Patient presents with    Shortness of Breath    Leg Swelling         History Provided By: Patient    Chief Complaint: SOB   Duration: 2-3 Days  Timing:  Intermittent  Severity: Moderate  Modifying Factors: worse when she is active   Associated Symptoms: right leg pain (lower leg) x 1 day       Additional History (Context): Colby Paz is a 80 y.o. female with PMHx of HTN, GERD, diabetes, and HTN who presents c/o intermittent moderate SOB onset for the past 2-3 days. The SOB is worse when she is active. Pt also c/o right lower leg pain x 1 day ago. Pt has no SOB at bedside. She lives alone and takes her medication daily. Per family at bedside they state the pt \"mixed up her medication\". Denies any injury to the leg, abd pain. Denies any further complaints or symptoms at the moment. PCP: Virgen Rome MD    Current Outpatient Prescriptions   Medication Sig Dispense Refill    lansoprazole (PREVACID) 30 mg capsule TAKE 1 CAP BY MOUTH DAILY (BEFORE BREAKFAST). Patient needs an appointment before any future refills are given. 90 Cap 0    metoprolol succinate (TOPROL-XL) 25 mg XL tablet Take 1 Tab by mouth daily. Patient needs an appointment before any future refills are given. 90 Tab 0    gemfibrozil (LOPID) 600 mg tablet Take 1 Tab by mouth daily. 90 Tab 3    chlorthalidone (HYGROTEN) 25 mg tablet Take 1 Tab by mouth daily. 90 Tab 3    metFORMIN (GLUCOPHAGE) 500 mg tablet Take 1 Tab by mouth two (2) times daily (with meals). 180 Tab 3    potassium chloride (K-DUR, KLOR-CON) 20 mEq tablet Take 1 Tab by mouth daily. 90 Tab 3    lisinopril (PRINIVIL, ZESTRIL) 20 mg tablet TAKE 1 TABLET DAILY 90 Tab 3    terazosin (HYTRIN) 5 mg capsule Take 1 Cap by mouth nightly. 90 Cap 3    digoxin (LANOXIN) 0.125 mg tablet Take 1 Tab by mouth daily. 90 Tab 3    aspirin 81 mg tablet Take 1 Tab by mouth daily. 1 Tab 0    methocarbamol (ROBAXIN) 500 mg tablet Take 1 Tab by mouth three (3) times daily. 30 Tab 0    glucose blood VI test strips (ACCU-CHEK COMPACT TEST) strip by Does Not Apply route. Use as directed 1 Package 11       Past History     Past Medical History:  Past Medical History:   Diagnosis Date    Cardiac echocardiogram 2008    EF 60-65%. No RWMA. RVSP 35 mmHg. Mild AI. Mild MR. Mild TR. Mild PI.  Cardiac nuclear imaging test 2004    A-fib. No evidence of ischemia or prior infarction. EF 59%. No WMA.  Cardiovascular lower extremity venous duplex 10/05/2011    No deep vein or superficial thrombosis bilaterally. Bilateral calf veins w/calcific changes.  Diabetes ([address] 2006    Essential hypertension, benign     GERD (gastroesophageal reflux disease)     Hypercholesterolemia     Hypertension     Long term (current) use of anticoagulants 3/14/2011    Paroxysmal atrial fibrillation (HCC)     S/P total hysterectomy late        Past Surgical History:  Past Surgical History:   Procedure Laterality Date    HX CATARACT REMOVAL      bilateral    HX HYSTERECTOMY  late        Family History:  Family History   Problem Relation Age of Onset    Hypertension Mother     Heart Failure Father       at age 80 from CHF    Diabetes Sister     Diabetes Brother        Social History:  Social History   Substance Use Topics    Smoking status: Never Smoker    Smokeless tobacco: Never Used    Alcohol use No       Allergies:  No Known Allergies      Review of Systems       Review of Systems   Constitutional: Negative for chills, fatigue and fever. HENT: Negative. Negative for sore throat. Eyes: Negative. Respiratory: Positive for shortness of breath. Negative for cough. Cardiovascular: Negative for chest pain and palpitations. Gastrointestinal: Negative for abdominal pain, nausea and vomiting. Genitourinary: Negative for dysuria. Musculoskeletal: Negative. Positive for leg pain    Skin: Negative. Neurological: Negative for dizziness, weakness, light-headedness and headaches. Psychiatric/Behavioral: Negative. All other systems reviewed and are negative. Physical Exam     Visit Vitals    /69    Pulse 61    Temp 98.5 °F (36.9 °C)    Resp 22    Wt 63 kg (139 lb)    SpO2 94%    BMI 24.62 kg/m2         Physical Exam   Constitutional: She is oriented to person, place, and time. She appears well-developed and well-nourished. HENT:   Head: Normocephalic and atraumatic. Mouth/Throat: Oropharynx is clear and moist.   Eyes: Conjunctivae are normal. Pupils are equal, round, and reactive to light. No scleral icterus. Neck: Normal range of motion. Neck supple. No JVD present. No tracheal deviation present. Cardiovascular: Normal rate, regular rhythm and normal heart sounds. Pulmonary/Chest: Effort normal and breath sounds normal. No respiratory distress. She has no wheezes. Abdominal: Soft. Bowel sounds are normal.   Musculoskeletal: Normal range of motion. She exhibits edema (mild bilateral ankle ). Neurological: She is alert and oriented to person, place, and time. She has normal strength. Gait normal. GCS eye subscore is 4. GCS verbal subscore is 5. GCS motor subscore is 6. Skin: Skin is warm and dry. Psychiatric: She has a normal mood and affect. Nursing note and vitals reviewed.         Diagnostic Study Results     Labs -  Recent Results (from the past 12 hour(s))   EKG, 12 LEAD, INITIAL    Collection Time: 06/06/18  4:40 PM   Result Value Ref Range    Ventricular Rate 61 BPM    Atrial Rate 61 BPM    P-R Interval 338 ms    QRS Duration 136 ms    Q-T Interval 436 ms    QTC Calculation (Bezet) 438 ms    Calculated P Axis 75 degrees    Calculated R Axis 63 degrees    Calculated T Axis -32 degrees    Diagnosis       Sinus rhythm with 1st degree AV block  Right bundle branch block  T wave abnormality, consider inferolateral ischemia  Abnormal ECG  When compared with ECG of 29-MAY-2017 18:19,  No significant change was found     METABOLIC PANEL, BASIC    Collection Time: 06/06/18  5:23 PM   Result Value Ref Range    Sodium 142 136 - 145 mmol/L    Potassium 3.7 3.5 - 5.5 mmol/L    Chloride 104 100 - 108 mmol/L    CO2 30 21 - 32 mmol/L    Anion gap 8 3.0 - 18 mmol/L    Glucose 116 (H) 74 - 99 mg/dL    BUN 20 (H) 7.0 - 18 MG/DL    Creatinine 0.88 0.6 - 1.3 MG/DL    BUN/Creatinine ratio 23 (H) 12 - 20      GFR est AA >60 >60 ml/min/1.73m2    GFR est non-AA 59 (L) >60 ml/min/1.73m2    Calcium 10.0 8.5 - 10.1 MG/DL   CBC WITH AUTOMATED DIFF    Collection Time: 06/06/18  5:23 PM   Result Value Ref Range    WBC 6.4 4.6 - 13.2 K/uL    RBC 4.14 (L) 4.20 - 5.30 M/uL    HGB 10.7 (L) 12.0 - 16.0 g/dL    HCT 35.3 35.0 - 45.0 %    MCV 85.3 74.0 - 97.0 FL    MCH 25.8 24.0 - 34.0 PG    MCHC 30.3 (L) 31.0 - 37.0 g/dL    RDW 15.0 (H) 11.6 - 14.5 %    PLATELET 613 121 - 382 K/uL    MPV 10.2 9.2 - 11.8 FL    NEUTROPHILS 59 40 - 73 %    LYMPHOCYTES 30 21 - 52 %    MONOCYTES 8 3 - 10 %    EOSINOPHILS 3 0 - 5 %    BASOPHILS 0 0 - 2 %    ABS. NEUTROPHILS 3.7 1.8 - 8.0 K/UL    ABS. LYMPHOCYTES 1.9 0.9 - 3.6 K/UL    ABS. MONOCYTES 0.5 0.05 - 1.2 K/UL    ABS. EOSINOPHILS 0.2 0.0 - 0.4 K/UL    ABS. BASOPHILS 0.0 0.0 - 0.06 K/UL    DF AUTOMATED     TROPONIN I    Collection Time: 06/06/18  5:23 PM   Result Value Ref Range    Troponin-I, Qt. <0.02 0.00 - 0.06 NG/ML   MAGNESIUM    Collection Time: 06/06/18  5:23 PM   Result Value Ref Range    Magnesium 1.5 (L) 1.6 - 2.6 mg/dL       Radiologic Studies -   XR CHEST PA LAT    (Results Pending)   Preliminary read per ED Physician showed: no acute changes compared to CXR of May 2017. Medical Decision Making   I am the first provider for this patient.     I reviewed the vital signs, available nursing notes, past medical history, past surgical history, family history and social history. Vital Signs-Reviewed the patient's vital signs. Pulse Oximetry Analysis -  94 % on RA    EKG: Interpreted by the EP. Time Interpreted: 16:40    Rate: 61 bpm    Rhythm: Sinus Rhythm    Interpretation: no acute changes, RBBB   Comparison: no changes to May 2017     Records Reviewed: Nursing Notes (Time of Review: 4:42 PM)    ED Course: Progress Notes, Reevaluation, and Consults:    Provider Notes (Medical Decision Making):     Diagnosis     Clinical Impression:   1. Dyspnea, unspecified type        Disposition: discharged. Follow-up Information     None           Patient's Medications   Start Taking    No medications on file   Continue Taking    ASPIRIN 81 MG TABLET    Take 1 Tab by mouth daily. CHLORTHALIDONE (HYGROTEN) 25 MG TABLET    Take 1 Tab by mouth daily. DIGOXIN (LANOXIN) 0.125 MG TABLET    Take 1 Tab by mouth daily. GEMFIBROZIL (LOPID) 600 MG TABLET    Take 1 Tab by mouth daily. GLUCOSE BLOOD VI TEST STRIPS (ACCU-CHEK COMPACT TEST) STRIP    by Does Not Apply route. Use as directed    LANSOPRAZOLE (PREVACID) 30 MG CAPSULE    TAKE 1 CAP BY MOUTH DAILY (BEFORE BREAKFAST). Patient needs an appointment before any future refills are given. LISINOPRIL (PRINIVIL, ZESTRIL) 20 MG TABLET    TAKE 1 TABLET DAILY    METFORMIN (GLUCOPHAGE) 500 MG TABLET    Take 1 Tab by mouth two (2) times daily (with meals). METHOCARBAMOL (ROBAXIN) 500 MG TABLET    Take 1 Tab by mouth three (3) times daily. METOPROLOL SUCCINATE (TOPROL-XL) 25 MG XL TABLET    Take 1 Tab by mouth daily. Patient needs an appointment before any future refills are given. POTASSIUM CHLORIDE (K-DUR, KLOR-CON) 20 MEQ TABLET    Take 1 Tab by mouth daily. TERAZOSIN (HYTRIN) 5 MG CAPSULE    Take 1 Cap by mouth nightly. These Medications have changed    No medications on file   Stop Taking    ACETAMINOPHEN (TYLENOL ARTHRITIS PAIN PO)    Take  by mouth.     HYDROCODONE-ACETAMINOPHEN (Dylan Chan) 5-325 MG PER TABLET    Take 1-2 tablets PO every 4-6 hours as needed for pain control. If over the counter ibuprofen or acetaminophen was suggested, then only take the vicodin for pain not well controlled with the over the counter medication. LANSOPRAZOLE (PREVACID) 30 MG CAPSULE    TAKE 1 CAP BY MOUTH DAILY (BEFORE BREAKFAST). NAPROXEN (NAPROSYN) 500 MG TABLET    Take 1 Tab by mouth two (2) times daily (with meals). _______________________________    Attestations:  Scribe Attestation     Carina Hahn (Aj) acting as a scribe for and in the presence of Hipolito Manley MD      June 06, 2018 at 4:42 PM       Provider Attestation:      I personally performed the services described in the documentation, reviewed the documentation, as recorded by the scribe in my presence, and it accurately and completely records my words and actions. June 06, 2018 at 4:42 PM - Hipolito Manley MD    _______________________________  Results reviewed with pt and family, they agree with dispo and F/U plan.   Hipolito Manley MD  7:04 PM

## 2018-06-06 NOTE — ED NOTES
Received \"hand-off\" bedside report from off-going-shift RN (Deysi),and assumed care of patient. Call bell with reach.

## 2018-06-06 NOTE — TELEPHONE ENCOUNTER
Called patient at [phone] (home)  (non-secure line) and did not leave a detailed message. Patient's female family member called for triage on patient shortness of breath and was disconnected during transfer of call. Attempted to call back.

## 2018-06-06 NOTE — DISCHARGE INSTRUCTIONS
Shortness of Breath: Care Instructions  Your Care Instructions  Shortness of breath has many causes. Sometimes conditions such as anxiety can lead to shortness of breath. Some people get mild shortness of breath when they exercise. Trouble breathing also can be a symptom of a serious problem, such as asthma, lung disease, emphysema, heart problems, and pneumonia. If your shortness of breath continues, you may need tests and treatment. Watch for any changes in your breathing and other symptoms. Follow-up care is a key part of your treatment and safety. Be sure to make and go to all appointments, and call your doctor if you are having problems. It's also a good idea to know your test results and keep a list of the medicines you take. How can you care for yourself at home? · Do not smoke or allow others to smoke around you. If you need help quitting, talk to your doctor about stop-smoking programs and medicines. These can increase your chances of quitting for good. · Get plenty of rest and sleep. · Take your medicines exactly as prescribed. Call your doctor if you think you are having a problem with your medicine. · Find healthy ways to deal with stress. ¨ Exercise daily. ¨ Get plenty of sleep. ¨ Eat regularly and well. When should you call for help? Call 911 anytime you think you may need emergency care. For example, call if:  ? · You have severe shortness of breath. ? · You have symptoms of a heart attack. These may include:  ¨ Chest pain or pressure, or a strange feeling in the chest.  ¨ Sweating. ¨ Shortness of breath. ¨ Nausea or vomiting. ¨ Pain, pressure, or a strange feeling in the back, neck, jaw, or upper belly or in one or both shoulders or arms. ¨ Lightheadedness or sudden weakness. ¨ A fast or irregular heartbeat. After you call 911, the  may tell you to chew 1 adult-strength or 2 to 4 low-dose aspirin. Wait for an ambulance. Do not try to drive yourself.    ?Call your doctor now or seek immediate medical care if:  ? · Your shortness of breath gets worse or you start to wheeze. Wheezing is a high-pitched sound when you breathe. ? · You wake up at night out of breath or have to prop your head up on several pillows to breathe. ? · You are short of breath after only light activity or while at rest.   ? Watch closely for changes in your health, and be sure to contact your doctor if:  ? · You do not get better over the next 1 to 2 days. Where can you learn more? Go to http://juju-karol.info/. Enter S780 in the search box to learn more about \"Shortness of Breath: Care Instructions. \"  Current as of: May 12, 2017  Content Version: 11.4  © 1779-8484 [employer]. Care instructions adapted under license by [employer] (which disclaims liability or warranty for this information). If you have questions about a medical condition or this instruction, always ask your healthcare professional. [address] any warranty or liability for your use of this information.

## 2018-06-06 NOTE — TELEPHONE ENCOUNTER
Called Tammie gaxiola this person was the female family member calling on behalf of the patient and the phone number [phone] was disconnected.

## 2018-06-07 ENCOUNTER — TELEPHONE (OUTPATIENT)
Dept: FAMILY MEDICINE CLINIC | Age: 83
End: 2018-06-07

## 2018-06-07 ENCOUNTER — PATIENT OUTREACH (OUTPATIENT)
Dept: FAMILY MEDICINE CLINIC | Age: 83
End: 2018-06-07

## 2018-06-07 LAB
ATRIAL RATE: 61 BPM
CALCULATED P AXIS, ECG09: 75 DEGREES
CALCULATED R AXIS, ECG10: 63 DEGREES
CALCULATED T AXIS, ECG11: -32 DEGREES
DIAGNOSIS, 93000: NORMAL
P-R INTERVAL, ECG05: 338 MS
Q-T INTERVAL, ECG07: 436 MS
QRS DURATION, ECG06: 136 MS
QTC CALCULATION (BEZET), ECG08: 438 MS
VENTRICULAR RATE, ECG03: 61 BPM

## 2018-06-07 NOTE — Clinical Note
Patient presented to [facility]  ED on 6/6/18  for SOB, bilateral ankle edema, & RLE pain. She's sched.  With you 6/11/18 at 1pm. -not interested in being followed by NN

## 2018-06-07 NOTE — PROGRESS NOTES
ED Discharge Follow-Up    Date/Time:  6/7/2018 1:30 PM    Patient presented to \[facility]\""  ED on 6/6/18 and discharged on 6/6/18 for SOB, bilateral ankle edema, & RLE pain. Top Challenges reviewed with the provider   None as per patient, but not interested in case management by NN       Nurse Navigator(NN) contacted the patient  by telephone to perform post ED discharge assessment. Verified 2 patient identifiers. Provided introduction to self, and explanation of the Nurse Navigator role. Contact within 1 business day(s) of discharge. Subjective data: \"I'm good. \" She cut off conversation quickly with NN. I was at least able to remind her of her appt. W/ PCP on 6/11/18 at 1PM.    Pertinent negatives: unknown    Medication:   New medications at discharge include:  none  There were no barriers to obtaining medications identified at this time. Does the patient have new prescription(s) to last until follow up with prescribing provider: yes  Pharmacy consult for polypharm needed?: no   Medication changes (dose adjustments or discontinued meds):    ACETAMINOPHEN (TYLENOL ARTHRITIS PAIN PO)    Take  by mouth.     HYDROCODONE-ACETAMINOPHEN (NORCO) 5-325 MG PER TABLET    Take 1-2 tablets PO every 4-6 hours as needed for pain control. If over the counter ibuprofen or acetaminophen was suggested, then only take the vicodin for pain not well controlled with the over the counter medication.     LANSOPRAZOLE (PREVACID) 30 MG CAPSULE    TAKE 1 CAP BY MOUTH DAILY (BEFORE BREAKFAST).   NAPROXEN (NAPROSYN) 500 MG TABLET    Take 1 Tab by mouth two (2) times daily (with meals). Offered follow up appointment with PCP: no, patient already scheduled for 6/11/18 at [facility]  Date Time Provider Rock Sylvester   6/11/2018 1:00 PM Virgen Rome MD [facility]   11/13/2018 1:40 PM Stephani Jackman MD [facility] Attends follow-up appointments as directed. Patient will attend all follow-up appointments as directed by 6/11/18

## 2018-06-07 NOTE — TELEPHONE ENCOUNTER
Spoke with patient to check patient's status due to patient went to emergency room yesterday. Patient verbalized understanding, stated patient was clear from emergency room, and had no concerns.

## 2018-06-11 ENCOUNTER — OFFICE VISIT (OUTPATIENT)
Dept: FAMILY MEDICINE CLINIC | Age: 83
End: 2018-06-11

## 2018-06-11 ENCOUNTER — PATIENT OUTREACH (OUTPATIENT)
Dept: FAMILY MEDICINE CLINIC | Age: 83
End: 2018-06-11

## 2018-06-11 VITALS
HEART RATE: 59 BPM | SYSTOLIC BLOOD PRESSURE: 124 MMHG | DIASTOLIC BLOOD PRESSURE: 71 MMHG | HEIGHT: 63 IN | WEIGHT: 130 LBS | RESPIRATION RATE: 16 BRPM | BODY MASS INDEX: 23.04 KG/M2 | TEMPERATURE: 98.2 F | OXYGEN SATURATION: 98 %

## 2018-06-11 DIAGNOSIS — R06.09 DOE (DYSPNEA ON EXERTION): Primary | ICD-10-CM

## 2018-06-11 DIAGNOSIS — R60.9 PERIPHERAL EDEMA: ICD-10-CM

## 2018-06-11 RX ORDER — CHLORTHALIDONE 25 MG/1
25 TABLET ORAL DAILY
Qty: 90 TAB | Refills: 3 | Status: SHIPPED | OUTPATIENT
Start: 2018-06-11 | End: 2018-07-12 | Stop reason: SDUPTHER

## 2018-06-11 RX ORDER — LANSOPRAZOLE 30 MG/1
CAPSULE, DELAYED RELEASE ORAL
Qty: 90 CAP | Refills: 3 | Status: SHIPPED | OUTPATIENT
Start: 2018-06-11 | End: 2018-08-09 | Stop reason: SDUPTHER

## 2018-06-11 RX ORDER — LISINOPRIL 20 MG/1
20 TABLET ORAL DAILY
Qty: 90 TAB | Refills: 3 | Status: SHIPPED | OUTPATIENT
Start: 2018-06-11 | End: 2019-07-22 | Stop reason: SDUPTHER

## 2018-06-11 RX ORDER — TERAZOSIN 5 MG/1
5 CAPSULE ORAL
Qty: 90 CAP | Refills: 3 | Status: SHIPPED | OUTPATIENT
Start: 2018-06-11 | End: 2019-07-22 | Stop reason: SDUPTHER

## 2018-06-11 RX ORDER — METOPROLOL SUCCINATE 25 MG/1
25 TABLET, EXTENDED RELEASE ORAL DAILY
Qty: 90 TAB | Refills: 3 | Status: SHIPPED | OUTPATIENT
Start: 2018-06-11 | End: 2018-07-20 | Stop reason: SDUPTHER

## 2018-06-11 NOTE — PROGRESS NOTES
1. Have you been to the ER, urgent care clinic since your last visit? Hospitalized since your last visit? Yes Where: [facility] emergency room    2. Have you seen or consulted any other health care providers outside of the [facility] since your last visit? Include any pap smears or colon screening.  No

## 2018-06-11 NOTE — PATIENT INSTRUCTIONS
Shortness of Breath: Care Instructions  Your Care Instructions  Shortness of breath has many causes. Sometimes conditions such as anxiety can lead to shortness of breath. Some people get mild shortness of breath when they exercise. Trouble breathing also can be a symptom of a serious problem, such as asthma, lung disease, emphysema, heart problems, and pneumonia. If your shortness of breath continues, you may need tests and treatment. Watch for any changes in your breathing and other symptoms. Follow-up care is a key part of your treatment and safety. Be sure to make and go to all appointments, and call your doctor if you are having problems. It's also a good idea to know your test results and keep a list of the medicines you take. How can you care for yourself at home? · Do not smoke or allow others to smoke around you. If you need help quitting, talk to your doctor about stop-smoking programs and medicines. These can increase your chances of quitting for good. · Get plenty of rest and sleep. · Take your medicines exactly as prescribed. Call your doctor if you think you are having a problem with your medicine. · Find healthy ways to deal with stress. ¨ Exercise daily. ¨ Get plenty of sleep. ¨ Eat regularly and well. When should you call for help? Call 911 anytime you think you may need emergency care. For example, call if:  ? · You have severe shortness of breath. ? · You have symptoms of a heart attack. These may include:  ¨ Chest pain or pressure, or a strange feeling in the chest.  ¨ Sweating. ¨ Shortness of breath. ¨ Nausea or vomiting. ¨ Pain, pressure, or a strange feeling in the back, neck, jaw, or upper belly or in one or both shoulders or arms. ¨ Lightheadedness or sudden weakness. ¨ A fast or irregular heartbeat. After you call 911, the  may tell you to chew 1 adult-strength or 2 to 4 low-dose aspirin. Wait for an ambulance. Do not try to drive yourself.    ?Call your doctor now or seek immediate medical care if:  ? · Your shortness of breath gets worse or you start to wheeze. Wheezing is a high-pitched sound when you breathe. ? · You wake up at night out of breath or have to prop your head up on several pillows to breathe. ? · You are short of breath after only light activity or while at rest.   ? Watch closely for changes in your health, and be sure to contact your doctor if:  ? · You do not get better over the next 1 to 2 days. Where can you learn more? Go to http://juju-karol.info/. Enter S780 in the search box to learn more about \"Shortness of Breath: Care Instructions. \"  Current as of: May 12, 2017  Content Version: 11.4  © 2382-4080 [employer]. Care instructions adapted under license by [employer] - is now [employer] (which disclaims liability or warranty for this information). If you have questions about a medical condition or this instruction, always ask your healthcare professional. [address] any warranty or liability for your use of this information.

## 2018-06-11 NOTE — PROGRESS NOTES
Goals Addressed             Most Recent     Attends follow-up appointments as directed. On track (6/11/2018)             Patient will attend all follow-up appointments as directed by 6/11/18            Patient attended her follow-up appt. With PCP today.

## 2018-06-11 NOTE — PROGRESS NOTES
HISTORY OF PRESENT ILLNESS  Ana Foster is a 80 y.o. female. HPI  Patient is here today for follow up on: Shortness of Breath    Shortness of Breath: states that last Friday when she was trying to make it to the bathroom to urinate she felt SOB. She has had some urge incontinence. She has not been taking myrbetriq. She has not felt SOB since that time. Pt was at her sisters home on Saturday; she did not c/o SOB at the time. No CP. She has had some mild ankle edema; her  Right leg has felt warm and tight. She has not been immobile recently. She is not sedentary. Pt had these similar sx of leg pain in April and had neg DVT studies at that time. Pt also went to the ED on 6/6/18 because of her SOB sx. She had a CXR ; no acute findings were found on CXR. Current Outpatient Prescriptions:     chlorthalidone (HYGROTEN) 25 mg tablet, Take 1 Tab by mouth daily. , Disp: 30 Tab, Rfl: 0    lisinopril (PRINIVIL, ZESTRIL) 20 mg tablet, Take 1 Tab by mouth daily. , Disp: 30 Tab, Rfl: 0    gemfibrozil (LOPID) 600 mg tablet, Take 1 Tab by mouth daily. , Disp: 30 Tab, Rfl: 0    metoprolol succinate (TOPROL-XL) 25 mg XL tablet, Take 1 Tab by mouth daily. , Disp: 30 Tab, Rfl: 0    lansoprazole (PREVACID) 30 mg capsule, TAKE 1 CAP BY MOUTH DAILY (BEFORE BREAKFAST). Patient needs an appointment before any future refills are given., Disp: 90 Cap, Rfl: 0    metFORMIN (GLUCOPHAGE) 500 mg tablet, Take 1 Tab by mouth two (2) times daily (with meals). , Disp: 180 Tab, Rfl: 3    potassium chloride (K-DUR, KLOR-CON) 20 mEq tablet, Take 1 Tab by mouth daily. , Disp: 90 Tab, Rfl: 3    terazosin (HYTRIN) 5 mg capsule, Take 1 Cap by mouth nightly., Disp: 90 Cap, Rfl: 3    digoxin (LANOXIN) 0.125 mg tablet, Take 1 Tab by mouth daily. , Disp: 90 Tab, Rfl: 3    aspirin 81 mg tablet, Take 1 Tab by mouth daily. , Disp: 1 Tab, Rfl: 0    glucose blood VI test strips (ACCU-CHEK COMPACT TEST) strip, by Does Not Apply route.  Use as directed, Disp: 1 Package, Rfl: 11      PMH,  Meds, Allergies, Family History, Social history reviewed    Review of Systems   Constitutional: Negative for fever. Respiratory: Negative for cough. Physical Exam   Constitutional: She appears well-developed and well-nourished. No distress. Cardiovascular: Normal rate and regular rhythm. Exam reveals no gallop and no friction rub. No murmur heard. Pulmonary/Chest: Breath sounds normal. No respiratory distress. She has no wheezes. She has no rales. Musculoskeletal: She exhibits edema (mild ankle edema; neg homans; no warmth to calf;  no TTP in posterior calf). Nursing note and vitals reviewed. Visit Vitals    /71 (BP 1 Location: Left arm, BP Patient Position: Sitting)    Pulse (!) 59    Temp 98.2 °F (36.8 °C) (Oral)    Resp 16    Ht 5' 3\" (1.6 m)    Wt 130 lb (59 kg)    SpO2 98%    BMI 23.03 kg/m2         ASSESSMENT and PLAN    ICD-10-CM ICD-9-CM    1. CHAPA (dyspnea on exertion) R06.09 786.09    2. Peripheral edema R60.9 782.3        As above, no evidence of pneumonia, heart failure PE currently. Pt seems to be more symptomatic with urinary urgency ; Resume myrbetric to control urinary urgency which may help CHAPA trying to get to the bathroom. Follow-up Disposition:  Return in about 6 weeks (around 7/23/2018) for CHAPA/urinary urgency. An After Visit Summary was printed and given to the patient. This has been fully explained to the patient, who indicates understanding.

## 2018-06-11 NOTE — MR AVS SNAPSHOT
[address] [place] [address] 
 
 
 [address] [address] [address] [address] 
[phone] Patient: Edgar Mccall MRN: [id number] Visit Information Date & Time Provider Department Dept. Phone Encounter #  
 6/11/2018  1:00 PM Taylor Wasserman, [address] [place] [address] [phone] [id number] Follow-up Instructions Return in about 6 weeks (around 7/23/2018) for CHAPA/urinary urgency. Your Appointments 11/13/2018  1:40 PM  
Follow Up with Marena Bumpers, MD  
[facility] ([facility]) Appt Note: 1 [place] [phone] [address] Upcoming Health Maintenance Date Due HEMOGLOBIN A1C Q6M 8/14/2017 MICROALBUMIN Q1 2/14/2018 LIPID PANEL Q1 2/14/2018 FOOT EXAM Q1 3/6/2018 MEDICARE YEARLY EXAM 3/14/2018 Influenza Age 5 to Adult 9/19/2018* EYE EXAM RETINAL OR DILATED Q1 12/4/2018 GLAUCOMA SCREENING Q2Y 12/4/2019 DTaP/Tdap/Td series (2 - Td) 8/16/2026 *Topic was postponed. The date shown is not the original due date. Allergies as of 6/11/2018  Review Complete On: 6/11/2018 By: Taylor Wasserman MD  
 No Known Allergies Current Immunizations  Never Reviewed No immunizations on file. Not reviewed this visit You Were Diagnosed With   
  
 Codes Comments CHAPA (dyspnea on exertion)    -  Primary ICD-10-CM: R06.09 
ICD-9-CM: 786.09 Peripheral edema     ICD-10-CM: R60.9 ICD-9-CM: 320. 3 Vitals BP Pulse Temp Resp Height(growth percentile) Weight(growth percentile) 124/71 (BP 1 Location: Left arm, BP Patient Position: Sitting) (!) 59 98.2 °F (36.8 °C) (Oral) 16 5' 3\" (1.6 m) 130 lb (59 kg) SpO2 BMI OB Status Smoking Status 98% 23.03 kg/m2 Hysterectomy Never Smoker BMI and BSA Data Body Mass Index Body Surface Area 23.03 kg/m 2 1.62 m 2 Preferred Pharmacy Pharmacy Name Phone [facility], [address] [facility] [address] [phone] Your Updated Medication List  
  
   
This list is accurate as of 6/11/18  1:42 PM.  Always use your most recent med list.  
  
  
  
  
 aspirin 81 mg tablet Take 1 Tab by mouth daily. chlorthalidone 25 mg tablet Commonly known as:  Fox Pummel Take 1 Tab by mouth daily. digoxin 0.125 mg tablet Commonly known as:  LANOXIN Take 1 Tab by mouth daily. gemfibrozil 600 mg tablet Commonly known as:  LOPID Take 1 Tab by mouth daily. glucose blood VI test strips strip Commonly known as:  ACCU-CHEK COMPACT TEST  
by Does Not Apply route. Use as directed  
  
 lansoprazole 30 mg capsule Commonly known as:  PREVACID TAKE 1 CAP BY MOUTH DAILY (BEFORE BREAKFAST). lisinopril 20 mg tablet Commonly known as:  Dorathy Massed Take 1 Tab by mouth daily. metFORMIN 500 mg tablet Commonly known as:  GLUCOPHAGE Take 1 Tab by mouth two (2) times daily (with meals). metoprolol succinate 25 mg XL tablet Commonly known as:  TOPROL-XL Take 1 Tab by mouth daily. MYRBETRIQ 25 mg ER tablet Generic drug:  mirabegron ER Take 25 mg by mouth daily. potassium chloride 20 mEq tablet Commonly known as:  K-DUR, KLOR-CON Take 1 Tab by mouth daily. terazosin 5 mg capsule Commonly known as:  HYTRIN Take 1 Cap by mouth nightly. Prescriptions Sent to Pharmacy Refills  
 lisinopril (PRINIVIL, ZESTRIL) 20 mg tablet 3 Sig: Take 1 Tab by mouth daily. Class: Normal  
 Pharmacy: [address] [facility], [address] [facility] Ph #: [phone] Route: Oral  
 terazosin (HYTRIN) 5 mg capsule 3 Sig: Take 1 Cap by mouth nightly.   
 Class: Normal  
 Pharmacy: [address] [facility], [address] [facility] Ph #: [phone] Route: Oral  
 chlorthalidone (HYGROTEN) 25 mg tablet 3 Sig: Take 1 Tab by mouth daily. Class: Normal  
 Pharmacy: [address] [facility], [address] [facility] Ph #: [phone] Route: Oral  
 metoprolol succinate (TOPROL-XL) 25 mg XL tablet 3 Sig: Take 1 Tab by mouth daily. Class: Normal  
 Pharmacy: [address] [facility], [address] [facility] Ph #: [phone] Route: Oral  
 lansoprazole (PREVACID) 30 mg capsule 3 Sig: TAKE 1 CAP BY MOUTH DAILY (BEFORE BREAKFAST). Class: Normal  
 Pharmacy: [address] [facility], [address] [facility] Ph #: [phone] Follow-up Instructions Return in about 6 weeks (around 7/23/2018) for CHAPA/urinary urgency. Patient Instructions Shortness of Breath: Care Instructions Your Care Instructions Shortness of breath has many causes. Sometimes conditions such as anxiety can lead to shortness of breath. Some people get mild shortness of breath when they exercise. Trouble breathing also can be a symptom of a serious problem, such as asthma, lung disease, emphysema, heart problems, and pneumonia. If your shortness of breath continues, you may need tests and treatment. Watch for any changes in your breathing and other symptoms. Follow-up care is a key part of your treatment and safety. Be sure to make and go to all appointments, and call your doctor if you are having problems. It's also a good idea to know your test results and keep a list of the medicines you take. How can you care for yourself at home? · Do not smoke or allow others to smoke around you. If you need help quitting, talk to your doctor about stop-smoking programs and medicines. These can increase your chances of quitting for good. · Get plenty of rest and sleep. · Take your medicines exactly as prescribed. Call your doctor if you think you are having a problem with your medicine. · Find healthy ways to deal with stress. ¨ Exercise daily. ¨ Get plenty of sleep. ¨ Eat regularly and well. When should you call for help? Call 911 anytime you think you may need emergency care. For example, call if: 
? · You have severe shortness of breath. ? · You have symptoms of a heart attack. These may include: ¨ Chest pain or pressure, or a strange feeling in the chest. 
¨ Sweating. ¨ Shortness of breath. ¨ Nausea or vomiting. ¨ Pain, pressure, or a strange feeling in the back, neck, jaw, or upper belly or in one or both shoulders or arms. ¨ Lightheadedness or sudden weakness. ¨ A fast or irregular heartbeat. After you call 911, the  may tell you to chew 1 adult-strength or 2 to 4 low-dose aspirin. Wait for an ambulance. Do not try to drive yourself. ?Call your doctor now or seek immediate medical care if: 
? · Your shortness of breath gets worse or you start to wheeze. Wheezing is a high-pitched sound when you breathe. ? · You wake up at night out of breath or have to prop your head up on several pillows to breathe. ? · You are short of breath after only light activity or while at rest. ? Watch closely for changes in your health, and be sure to contact your doctor if: 
? · You do not get better over the next 1 to 2 days. Where can you learn more? Go to http://juju-karol.info/. Enter S780 in the search box to learn more about \"Shortness of Breath: Care Instructions. \" Current as of: May 12, 2017 Content Version: 11.4 © 8822-3322 [employer]. Care instructions adapted under license by [employer] (which disclaims liability or warranty for this information). If you have questions about a medical condition or this instruction, always ask your healthcare professional. [address] any warranty or liability for your use of this information. Introducing [facility]! Emily Mcgrath introduces [employer] patient portal. Now you can access parts of your medical record, email your doctor's office, and request medication refills online. 1. In your internet browser, go to [email or web address] 2. Click on the First Time User? Click Here link in the Sign In box. You will see the New Member Sign Up page. 3. Enter your [employer] Access Code exactly as it appears below. You will not need to use this code after youve completed the sign-up process. If you do not sign up before the expiration date, you must request a new code. · [employer] Access Code: [email or web address] Expires: 7/12/2018 12:35 PM 
 
4. Enter the last four digits of your Social Security Number (xxxx) and Date of Birth (mm/dd/yyyy) as indicated and click Submit. You will be taken to the next sign-up page. 5. Create a [employer] ID. This will be your [employer] login ID and cannot be changed, so think of one that is secure and easy to remember. 6. Create a [employer] password. You can change your password at any time. 7. Enter your Password Reset Question and Answer. This can be used at a later time if you forget your password. 8. Enter your e-mail address. You will receive e-mail notification when new information is available in [address] 9. Click Sign Up. You can now view and download portions of your medical record. 10. Click the Download Summary menu link to download a portable copy of your medical information. If you have questions, please visit the Frequently Asked Questions section of the [employer] website. Remember, [employer] is NOT to be used for urgent needs. For medical emergencies, dial 911. Now available from your iPhone and Android! Please provide this summary of care documentation to your next provider. Your primary care clinician is listed as [address]. If you have any questions after today's visit, please call [phone].

## 2018-06-14 RX ORDER — METFORMIN HYDROCHLORIDE 500 MG/1
500 TABLET ORAL 2 TIMES DAILY WITH MEALS
Qty: 30 TAB | Refills: 0 | Status: SHIPPED | OUTPATIENT
Start: 2018-06-14 | End: 2018-07-17 | Stop reason: SDUPTHER

## 2018-06-24 ENCOUNTER — APPOINTMENT (OUTPATIENT)
Dept: GENERAL RADIOLOGY | Age: 83
End: 2018-06-24
Attending: PHYSICIAN ASSISTANT
Payer: MEDICARE

## 2018-06-24 ENCOUNTER — HOSPITAL ENCOUNTER (EMERGENCY)
Age: 83
Discharge: HOME | End: 2018-06-24
Attending: EMERGENCY MEDICINE
Payer: MEDICARE

## 2018-06-24 VITALS
TEMPERATURE: 98.5 F | RESPIRATION RATE: 18 BRPM | HEART RATE: 66 BPM | DIASTOLIC BLOOD PRESSURE: 76 MMHG | BODY MASS INDEX: 24.62 KG/M2 | WEIGHT: 139 LBS | OXYGEN SATURATION: 97 % | SYSTOLIC BLOOD PRESSURE: 138 MMHG

## 2018-06-24 DIAGNOSIS — R60.9 PERIPHERAL EDEMA: Primary | ICD-10-CM

## 2018-06-24 LAB
ANION GAP SERPL CALC-SCNC: 7 MMOL/L (ref 3–18)
APPEARANCE UR: CLEAR
BACTERIA URNS QL MICRO: ABNORMAL /HPF
BASOPHILS # BLD: 0 K/UL (ref 0–0.06)
BASOPHILS NFR BLD: 0 % (ref 0–2)
BILIRUB UR QL: NEGATIVE
BNP SERPL-MCNC: 216 PG/ML (ref 0–1800)
BUN SERPL-MCNC: 27 MG/DL (ref 7–18)
BUN/CREAT SERPL: 26 (ref 12–20)
CALCIUM SERPL-MCNC: 10.1 MG/DL (ref 8.5–10.1)
CHLORIDE SERPL-SCNC: 105 MMOL/L (ref 100–108)
CK MB CFR SERPL CALC: NORMAL % (ref 0–4)
CK MB SERPL-MCNC: <1 NG/ML (ref 5–25)
CK SERPL-CCNC: 54 U/L (ref 26–192)
CO2 SERPL-SCNC: 27 MMOL/L (ref 21–32)
COLOR UR: YELLOW
CREAT SERPL-MCNC: 1.02 MG/DL (ref 0.6–1.3)
DIFFERENTIAL METHOD BLD: ABNORMAL
EOSINOPHIL # BLD: 0.2 K/UL (ref 0–0.4)
EOSINOPHIL NFR BLD: 3 % (ref 0–5)
EPITH CASTS URNS QL MICRO: ABNORMAL /LPF (ref 0–5)
ERYTHROCYTE [DISTWIDTH] IN BLOOD BY AUTOMATED COUNT: 15.4 % (ref 11.6–14.5)
GLUCOSE SERPL-MCNC: 125 MG/DL (ref 74–99)
GLUCOSE UR STRIP.AUTO-MCNC: NEGATIVE MG/DL
HCT VFR BLD AUTO: 32.8 % (ref 35–45)
HGB BLD-MCNC: 10 G/DL (ref 12–16)
HGB UR QL STRIP: NEGATIVE
KETONES UR QL STRIP.AUTO: NEGATIVE MG/DL
LEUKOCYTE ESTERASE UR QL STRIP.AUTO: ABNORMAL
LYMPHOCYTES # BLD: 1.8 K/UL (ref 0.9–3.6)
LYMPHOCYTES NFR BLD: 25 % (ref 21–52)
MCH RBC QN AUTO: 25.6 PG (ref 24–34)
MCHC RBC AUTO-ENTMCNC: 30.5 G/DL (ref 31–37)
MCV RBC AUTO: 84.1 FL (ref 74–97)
MONOCYTES # BLD: 0.6 K/UL (ref 0.05–1.2)
MONOCYTES NFR BLD: 9 % (ref 3–10)
NEUTS SEG # BLD: 4.5 K/UL (ref 1.8–8)
NEUTS SEG NFR BLD: 63 % (ref 40–73)
NITRITE UR QL STRIP.AUTO: NEGATIVE
PH UR STRIP: 6 [PH] (ref 5–8)
PLATELET # BLD AUTO: 295 K/UL (ref 135–420)
PMV BLD AUTO: 9.5 FL (ref 9.2–11.8)
POTASSIUM SERPL-SCNC: 3.8 MMOL/L (ref 3.5–5.5)
PROT UR STRIP-MCNC: NEGATIVE MG/DL
RBC # BLD AUTO: 3.9 M/UL (ref 4.2–5.3)
RBC #/AREA URNS HPF: ABNORMAL /HPF (ref 0–5)
SODIUM SERPL-SCNC: 139 MMOL/L (ref 136–145)
SP GR UR REFRACTOMETRY: 1.01 (ref 1–1.03)
TROPONIN I SERPL-MCNC: <0.02 NG/ML (ref 0–0.06)
UROBILINOGEN UR QL STRIP.AUTO: 1 EU/DL (ref 0.2–1)
WBC # BLD AUTO: 7.2 K/UL (ref 4.6–13.2)
WBC URNS QL MICRO: ABNORMAL /HPF (ref 0–4)

## 2018-06-24 PROCEDURE — 93005 ELECTROCARDIOGRAM TRACING: CPT

## 2018-06-24 PROCEDURE — 99283 EMERGENCY DEPT VISIT LOW MDM: CPT

## 2018-06-24 PROCEDURE — 96361 HYDRATE IV INFUSION ADD-ON: CPT

## 2018-06-24 PROCEDURE — 81001 URINALYSIS AUTO W/SCOPE: CPT | Performed by: EMERGENCY MEDICINE

## 2018-06-24 PROCEDURE — 82550 ASSAY OF CK (CPK): CPT | Performed by: PHYSICIAN ASSISTANT

## 2018-06-24 PROCEDURE — 83880 ASSAY OF NATRIURETIC PEPTIDE: CPT | Performed by: PHYSICIAN ASSISTANT

## 2018-06-24 PROCEDURE — 74011250636 HC RX REV CODE- 250/636: Performed by: EMERGENCY MEDICINE

## 2018-06-24 PROCEDURE — 77030038269 HC DRN EXT URIN PURWCK BARD -A

## 2018-06-24 PROCEDURE — 80048 BASIC METABOLIC PNL TOTAL CA: CPT | Performed by: PHYSICIAN ASSISTANT

## 2018-06-24 PROCEDURE — 73630 X-RAY EXAM OF FOOT: CPT

## 2018-06-24 PROCEDURE — 96374 THER/PROPH/DIAG INJ IV PUSH: CPT

## 2018-06-24 PROCEDURE — 85025 COMPLETE CBC W/AUTO DIFF WBC: CPT | Performed by: PHYSICIAN ASSISTANT

## 2018-06-24 PROCEDURE — 71045 X-RAY EXAM CHEST 1 VIEW: CPT

## 2018-06-24 RX ORDER — SODIUM CHLORIDE 9 MG/ML
250 INJECTION, SOLUTION INTRAVENOUS CONTINUOUS
Status: DISCONTINUED | OUTPATIENT
Start: 2018-06-24 | End: 2018-06-24 | Stop reason: HOSPADM

## 2018-06-24 RX ORDER — FUROSEMIDE 10 MG/ML
20 INJECTION INTRAMUSCULAR; INTRAVENOUS
Status: COMPLETED | OUTPATIENT
Start: 2018-06-24 | End: 2018-06-24

## 2018-06-24 RX ORDER — FUROSEMIDE 10 MG/ML
40 INJECTION INTRAMUSCULAR; INTRAVENOUS
Status: DISCONTINUED | OUTPATIENT
Start: 2018-06-24 | End: 2018-06-24 | Stop reason: DRUGHIGH

## 2018-06-24 RX ADMIN — FUROSEMIDE 20 MG: 10 INJECTION, SOLUTION INTRAVENOUS at 17:09

## 2018-06-24 RX ADMIN — SODIUM CHLORIDE 250 ML: 900 INJECTION, SOLUTION INTRAVENOUS at 16:50

## 2018-06-24 NOTE — DISCHARGE INSTRUCTIONS
Low Sodium Diet (2,000 Milligram): Care Instructions  Your Care Instructions    Too much sodium causes your body to hold on to extra water. This can raise your blood pressure and force your heart and kidneys to work harder. In very serious cases, this could cause you to be put in the hospital. It might even be life-threatening. By limiting sodium, you will feel better and lower your risk of serious problems. The most common source of sodium is salt. People get most of the salt in their diet from canned, prepared, and packaged foods. Fast food and restaurant meals also are very high in sodium. Your doctor will probably limit your sodium to less than 2,000 milligrams (mg) a day. This limit counts all the sodium in prepared and packaged foods and any salt you add to your food. Follow-up care is a key part of your treatment and safety. Be sure to make and go to all appointments, and call your doctor if you are having problems. It's also a good idea to know your test results and keep a list of the medicines you take. How can you care for yourself at home? Read food labels  · Read labels on cans and food packages. The labels tell you how much sodium is in each serving. Make sure that you look at the serving size. If you eat more than the serving size, you have eaten more sodium. · Food labels also tell you the Percent Daily Value for sodium. Choose products with low Percent Daily Values for sodium. · Be aware that sodium can come in forms other than salt, including monosodium glutamate (MSG), sodium citrate, and sodium bicarbonate (baking soda). MSG is often added to Asian food. When you eat out, you can sometimes ask for food without MSG or added salt. Buy low-sodium foods  · Buy foods that are labeled \"unsalted\" (no salt added), \"sodium-free\" (less than 5 mg of sodium per serving), or \"low-sodium\" (less than 140 mg of sodium per serving).  Foods labeled \"reduced-sodium\" and \"light sodium\" may still have too much sodium. Be sure to read the label to see how much sodium you are getting. · Buy fresh vegetables, or frozen vegetables without added sauces. Buy low-sodium versions of canned vegetables, soups, and other canned goods. Prepare low-sodium meals  · Cut back on the amount of salt you use in cooking. This will help you adjust to the taste. Do not add salt after cooking. One teaspoon of salt has about 2,300 mg of sodium. · Take the salt shaker off the table. · Flavor your food with garlic, lemon juice, onion, vinegar, herbs, and spices. Do not use soy sauce, lite soy sauce, steak sauce, onion salt, garlic salt, celery salt, mustard, or ketchup on your food. · Use low-sodium salad dressings, sauces, and ketchup. Or make your own salad dressings and sauces without adding salt. · Use less salt (or none) when recipes call for it. You can often use half the salt a recipe calls for without losing flavor. Other foods such as rice, pasta, and grains do not need added salt. · Rinse canned vegetables, and cook them in fresh water. This removes some-but not all-of the salt. · Avoid water that is naturally high in sodium or that has been treated with water softeners, which add sodium. Call your local water company to find out the sodium content of your water supply. If you buy bottled water, read the label and choose a sodium-free brand. Avoid high-sodium foods  · Avoid eating:  ¨ Smoked, cured, salted, and canned meat, fish, and poultry. ¨ Ham, crowley, hot dogs, and luncheon meats. ¨ Regular, hard, and processed cheese and regular peanut butter. ¨ Crackers with salted tops, and other salted snack foods such as pretzels, chips, and salted popcorn. ¨ Frozen prepared meals, unless labeled low-sodium. ¨ Canned and dried soups, broths, and bouillon, unless labeled sodium-free or low-sodium. ¨ Canned vegetables, unless labeled sodium-free or low-sodium. ¨ Western Karen fries, pizza, tacos, and other fast foods.   Jose F Lancaster, olives, ketchup, and other condiments, especially soy sauce, unless labeled sodium-free or low-sodium. Where can you learn more? Go to http://juju-karol.info/. Enter I869 in the search box to learn more about \"Low Sodium Diet (2,000 Milligram): Care Instructions. \"  Current as of: May 12, 2017  Content Version: 11.4  © 8837-3600 [employer]. Care instructions adapted under license by [employer] (which disclaims liability or warranty for this information). If you have questions about a medical condition or this instruction, always ask your healthcare professional. [place] [address] any warranty or liability for your use of this information. Leg and Ankle Edema: Care Instructions  Your Care Instructions  Swelling in the legs, ankles, and feet is called edema. It is common after you sit or stand for a while. Long plane flights or car rides often cause swelling in the legs and feet. You may also have swelling if you have to stand for long periods of time at your job. Problems with the veins in the legs (varicose veins) and changes in hormones can also cause swelling. Sometimes the swelling in the ankles and feet is caused by a more serious problem, such as heart failure, infection, blood clots, or liver or kidney disease. Follow-up care is a key part of your treatment and safety. Be sure to make and go to all appointments, and call your doctor if you are having problems. It's also a good idea to know your test results and keep a list of the medicines you take. How can you care for yourself at home? · If your doctor gave you medicine, take it as prescribed. Call your doctor if you think you are having a problem with your medicine. · Whenever you are resting, raise your legs up. Try to keep the swollen area higher than the level of your heart. · Take breaks from standing or sitting in one position.   ¨ Walk around to increase the blood flow in your lower legs. ¨ Move your feet and ankles often while you stand, or tighten and relax your leg muscles. · Wear support stockings. Put them on in the morning, before swelling gets worse. · Eat a balanced diet. Lose weight if you need to. · Limit the amount of salt (sodium) in your diet. Salt holds fluid in the body and may increase swelling. When should you call for help? Call 911 anytime you think you may need emergency care. For example, call if:  ? · You have symptoms of a blood clot in your lung (called a pulmonary embolism). These may include:  ¨ Sudden chest pain. ¨ Trouble breathing. ¨ Coughing up blood. ?Call your doctor now or seek immediate medical care if:  ? · You have signs of a blood clot, such as:  ¨ Pain in your calf, back of the knee, thigh, or groin. ¨ Redness and swelling in your leg or groin. ? · You have symptoms of infection, such as:  ¨ Increased pain, swelling, warmth, or redness. ¨ Red streaks or pus. ¨ A fever. ? Watch closely for changes in your health, and be sure to contact your doctor if:  ? · Your swelling is getting worse. ? · You have new or worsening pain in your legs. ? · You do not get better as expected. Where can you learn more? Go to http://juju-karol.info/. Enter N951 in the search box to learn more about \"Leg and Ankle Edema: Care Instructions. \"  Current as of: March 20, 2017  Content Version: 11.4  © 9296-8767 [employer]. Care instructions adapted under license by [employer] (which disclaims liability or warranty for this information). If you have questions about a medical condition or this instruction, always ask your healthcare professional. [address] any warranty or liability for your use of this information.

## 2018-06-24 NOTE — ED TRIAGE NOTES
C/o bilateral pedal edema & pain x 1 week. Pt states she has also experienced intermittent shortness of breath with activity the past week. Pt's bp noted 87/56 @ triage. Pt's family member reports that pt's bp has been reading \"LOW\" on home bp monitor \"for a while\".

## 2018-06-24 NOTE — ED PROVIDER NOTES
EMERGENCY DEPARTMENT HISTORY AND PHYSICAL EXAM    4:10 PM      Date: 6/24/2018  Patient Name: Franklin Geronimo    History of Presenting Illness     Chief Complaint   Patient presents with    Foot Swelling    Foot Pain    Hypotension    Shortness of Breath         History Provided By: Patient    Chief Complaint: Foot pain  Duration: 2 Weeks  Timing:  Constant  Location: Right  Quality:   Severity: Mild  Modifying Factors: s/p stub in kitchen  Associated Symptoms: LE edema, SOB. Denies fever or CP      Additional History (Context): Franklin Geronimo is a 80 y.o. female with hx of HTN, GERD, A-fib, Hypercholesterolemia, DM, CHF, and HTN who presents with c/o constant mild right foot pain onset 2 weeks s/p stubbing great toe in kitchen. Pt states pain is increased at night and reports LE edema with increased edema in right foot. Pt also reports SOB when she is rushing to the phone and with exertion. Denies fever or CP. Pt is non-smoker and denies ETOH/drug use. No other current complaints or symptoms reported. Sister and niece at bedside. PCP: Gisell Contreras MD    Current Facility-Administered Medications   Medication Dose Route Frequency Provider Last Rate Last Dose    0.9% sodium chloride infusion 250 mL  250 mL IntraVENous CONTINUOUS Esperanza Metz DO   Stopped at 06/24/18 1730     Current Outpatient Prescriptions   Medication Sig Dispense Refill    metFORMIN (GLUCOPHAGE) 500 mg tablet Take 1 Tab by mouth two (2) times daily (with meals). 30 Tab 0    lisinopril (PRINIVIL, ZESTRIL) 20 mg tablet Take 1 Tab by mouth daily. 90 Tab 3    terazosin (HYTRIN) 5 mg capsule Take 1 Cap by mouth nightly. 90 Cap 3    chlorthalidone (HYGROTEN) 25 mg tablet Take 1 Tab by mouth daily. 90 Tab 3    metoprolol succinate (TOPROL-XL) 25 mg XL tablet Take 1 Tab by mouth daily. 90 Tab 3    lansoprazole (PREVACID) 30 mg capsule TAKE 1 CAP BY MOUTH DAILY (BEFORE BREAKFAST).  90 Cap 3    mirabegron ER (MYRBETRIQ) 25 mg ER tablet Take 25 mg by mouth daily.  gemfibrozil (LOPID) 600 mg tablet Take 1 Tab by mouth daily. 30 Tab 0    potassium chloride (K-DUR, KLOR-CON) 20 mEq tablet Take 1 Tab by mouth daily. 90 Tab 3    digoxin (LANOXIN) 0.125 mg tablet Take 1 Tab by mouth daily. 90 Tab 3    aspirin 81 mg tablet Take 1 Tab by mouth daily. 1 Tab 0    glucose blood VI test strips (ACCU-CHEK COMPACT TEST) strip by Does Not Apply route. Use as directed 1 Package 11       Past History     Past Medical History:  Past Medical History:   Diagnosis Date    Cardiac echocardiogram 2008    EF 60-65%. No RWMA. RVSP 35 mmHg. Mild AI. Mild MR. Mild TR. Mild PI.  Cardiac nuclear imaging test 2004    A-fib. No evidence of ischemia or prior infarction. EF 59%. No WMA.  Cardiovascular lower extremity venous duplex 10/05/2011    No deep vein or superficial thrombosis bilaterally. Bilateral calf veins w/calcific changes.  Diabetes ([address] 2006    Essential hypertension, benign     GERD (gastroesophageal reflux disease)     Hypercholesterolemia     Hypertension     Long term (current) use of anticoagulants 3/14/2011    Paroxysmal atrial fibrillation (HCC)     S/P total hysterectomy late 's       Past Surgical History:  Past Surgical History:   Procedure Laterality Date    HX CATARACT REMOVAL      bilateral    HX HYSTERECTOMY  late        Family History:  Family History   Problem Relation Age of Onset    Hypertension Mother     Heart Failure Father       at age 80 from CHF    Diabetes Sister     Diabetes Brother        Social History:  Social History   Substance Use Topics    Smoking status: Never Smoker    Smokeless tobacco: Never Used    Alcohol use No       Allergies:  No Known Allergies      Review of Systems       Review of Systems   Constitutional: Negative for activity change, fatigue and fever. HENT: Negative for congestion and rhinorrhea.     Eyes: Negative for visual disturbance. Respiratory: Positive for shortness of breath. Cardiovascular: Positive for leg swelling. Negative for chest pain and palpitations. Gastrointestinal: Negative for abdominal pain, diarrhea, nausea and vomiting. Genitourinary: Negative for dysuria and hematuria. Musculoskeletal: Negative for back pain. Right foot pain   Skin: Negative for rash. Neurological: Negative for dizziness, weakness and light-headedness. All other systems reviewed and are negative. Physical Exam     Visit Vitals    /55 (BP 1 Location: Left arm, BP Patient Position: At rest)    Pulse 66    Temp 98.5 °F (36.9 °C)    Resp 18    Wt 63 kg (139 lb)    SpO2 96%    BMI 24.62 kg/m2         Physical Exam   Constitutional: She is oriented to person, place, and time. She appears well-developed and well-nourished. Non-toxic appearance. She does not have a sickly appearance. She does not appear ill. No distress. HENT:   Head: Normocephalic and atraumatic. Mouth/Throat: Oropharynx is clear and moist. No oropharyngeal exudate. Eyes: Conjunctivae and EOM are normal. Pupils are equal, round, and reactive to light. No scleral icterus. Neck: Normal range of motion. Neck supple. No hepatojugular reflux and no JVD present. No tracheal deviation present. No thyromegaly present. Cardiovascular: Normal rate, S1 normal, S2 normal, intact distal pulses and normal pulses. An irregular rhythm present. Exam reveals no gallop, no S3 and no S4. Murmur heard. Systolic murmur is present with a grade of 3/6   Pulses:       Radial pulses are 2+ on the right side, and 2+ on the left side. Dorsalis pedis pulses are 2+ on the right side, and 2+ on the left side. Pulmonary/Chest: Effort normal and breath sounds normal. No accessory muscle usage. No respiratory distress. She has no decreased breath sounds. She has no wheezes. She has no rhonchi. She has no rales. Abdominal: Soft.  Normal appearance and bowel sounds are normal. She exhibits no distension and no mass. There is no hepatosplenomegaly. There is no tenderness. There is no rigidity, no rebound, no guarding, no CVA tenderness, no tenderness at McBurney's point and negative Kenny's sign. Musculoskeletal: Normal range of motion. She exhibits edema (1+ edema bilateral ). Strength 4/5 throughout    Lymphadenopathy:        Head (right side): No submental, no submandibular, no preauricular and no occipital adenopathy present. Head (left side): No submental, no submandibular, no preauricular and no occipital adenopathy present. She has no cervical adenopathy. Right: No supraclavicular adenopathy present. Left: No supraclavicular adenopathy present. Neurological: She is alert and oriented to person, place, and time. She has normal strength and normal reflexes. She is not disoriented. No cranial nerve deficit or sensory deficit. Coordination and gait normal. GCS eye subscore is 4. GCS verbal subscore is 5. GCS motor subscore is 6. Grossly intact    Skin: Skin is warm, dry and intact. No rash noted. She is not diaphoretic. Psychiatric: She has a normal mood and affect. Her speech is normal and behavior is normal. Judgment and thought content normal. Cognition and memory are normal.   Nursing note and vitals reviewed.         Diagnostic Study Results     Labs -  Recent Results (from the past 12 hour(s))   EKG, 12 LEAD, INITIAL    Collection Time: 06/24/18  4:28 PM   Result Value Ref Range    Ventricular Rate 68 BPM    Atrial Rate 68 BPM    P-R Interval 278 ms    QRS Duration 136 ms    Q-T Interval 426 ms    QTC Calculation (Bezet) 452 ms    Calculated P Axis 74 degrees    Calculated R Axis 59 degrees    Calculated T Axis -33 degrees    Diagnosis       Sinus rhythm with 1st degree AV block  Right bundle branch block  T wave abnormality, consider inferolateral ischemia  Abnormal ECG  When compared with ECG of 06-JUN-2018 16:40,  No significant change was found     CBC WITH AUTOMATED DIFF    Collection Time: 06/24/18  4:35 PM   Result Value Ref Range    WBC 7.2 4.6 - 13.2 K/uL    RBC 3.90 (L) 4.20 - 5.30 M/uL    HGB 10.0 (L) 12.0 - 16.0 g/dL    HCT 32.8 (L) 35.0 - 45.0 %    MCV 84.1 74.0 - 97.0 FL    MCH 25.6 24.0 - 34.0 PG    MCHC 30.5 (L) 31.0 - 37.0 g/dL    RDW 15.4 (H) 11.6 - 14.5 %    PLATELET 173 391 - 936 K/uL    MPV 9.5 9.2 - 11.8 FL    NEUTROPHILS 63 40 - 73 %    LYMPHOCYTES 25 21 - 52 %    MONOCYTES 9 3 - 10 %    EOSINOPHILS 3 0 - 5 %    BASOPHILS 0 0 - 2 %    ABS. NEUTROPHILS 4.5 1.8 - 8.0 K/UL    ABS. LYMPHOCYTES 1.8 0.9 - 3.6 K/UL    ABS. MONOCYTES 0.6 0.05 - 1.2 K/UL    ABS. EOSINOPHILS 0.2 0.0 - 0.4 K/UL    ABS. BASOPHILS 0.0 0.0 - 0.06 K/UL    DF AUTOMATED     METABOLIC PANEL, BASIC    Collection Time: 06/24/18  4:35 PM   Result Value Ref Range    Sodium 139 136 - 145 mmol/L    Potassium 3.8 3.5 - 5.5 mmol/L    Chloride 105 100 - 108 mmol/L    CO2 27 21 - 32 mmol/L    Anion gap 7 3.0 - 18 mmol/L    Glucose 125 (H) 74 - 99 mg/dL    BUN 27 (H) 7.0 - 18 MG/DL    Creatinine 1.02 0.6 - 1.3 MG/DL    BUN/Creatinine ratio 26 (H) 12 - 20      GFR est AA >60 >60 ml/min/1.73m2    GFR est non-AA 50 (L) >60 ml/min/1.73m2    Calcium 10.1 8.5 - 10.1 MG/DL   NT-PRO BNP    Collection Time: 06/24/18  4:35 PM   Result Value Ref Range    NT pro- 0 - 1800 PG/ML   CARDIAC PANEL,(CK, CKMB & TROPONIN)    Collection Time: 06/24/18  4:35 PM   Result Value Ref Range    CK 54 26 - 192 U/L    CK - MB <1.0 <3.6 ng/ml    CK-MB Index  0.0 - 4.0 %     CALCULATION NOT PERFORMED WHEN RESULT IS BELOW LINEAR LIMIT    Troponin-I, Qt. <0.02 0.00 - 0.06 NG/ML       Radiologic Studies -   XR CHEST PORT    (Results Pending)   XR FOOT RT MIN 3 V    (Results Pending)         Medical Decision Making   I am the first provider for this patient.     I reviewed the vital signs, available nursing notes, past medical history, past surgical history, family history and social history. Vital Signs-Reviewed the patient's vital signs. Records Reviewed: Nursing Notes (Time of Review: 4:18 PM)    ED Course: Progress Notes, Reevaluation, and Consults:  Labs essentially normal with the exception of Hemoglobin of 10.0 (this is chronic). Troponin (-). . Chest X-Ray showed No acute process. EKG showed SR with rate of 68 bpm. 1st degree block. RBBB. ST depressions throughout. XR Right Foot showed no acute process. 6:04 PM  6/24/2018    Provider Notes (Medical Decision Making):  MDM  Number of Diagnoses or Management Options  Peripheral edema:   Diagnosis management comments: Shortness of breath etiologies include chronic obstructive pulmonary disease (COPD), acute asthma exacerbation, congestive heart failure, pneumonia, acute bronchitis, pulmonary embolism, upper respiratory infection, cardiac event to include acute coronary syndrome, acute myocardial infarction or a combination of the above (ex URI on top of COPD thus causing respiratory distress). Diagnosis       I have reassessed the patient. Patient is feeling much better. Patient will not be prescribed medications. Patient was discharged in stable condition. Patient is to return to emergency department if any new or worsening condition. 6:08 PM     Clinical Impression:   1.  Peripheral edema        Disposition: Discharge    Follow-up Information     Follow up With Details Comments Contact Info    Bryan Evangelista MD Call in 2 days For follow up [facility] [address] [place] [address]  [phone]      [facility] EMERGENCY DEPT Go to As needed, If symptoms worsen [address] [facility]  [phone]           _______________________________    Attestations:  Scribe Attestation     Alena Carr acting as a scribe for and in the presence of Gavin Reese,       June 24, 2018 at 4:18 PM       Provider Attestation:      I personally performed the services described in the documentation, reviewed the documentation, as recorded by the scribe in my presence, and it accurately and completely records my words and actions.  June 24, 2018 at 4:18 PM - [address] DO    _______________________________

## 2018-06-25 LAB
ATRIAL RATE: 68 BPM
CALCULATED P AXIS, ECG09: 74 DEGREES
CALCULATED R AXIS, ECG10: 59 DEGREES
CALCULATED T AXIS, ECG11: -33 DEGREES
DIAGNOSIS, 93000: NORMAL
P-R INTERVAL, ECG05: 278 MS
Q-T INTERVAL, ECG07: 426 MS
QRS DURATION, ECG06: 136 MS
QTC CALCULATION (BEZET), ECG08: 452 MS
VENTRICULAR RATE, ECG03: 68 BPM

## 2018-06-26 RX ORDER — DIGOXIN 125 MCG
TABLET ORAL
Qty: 90 TAB | Refills: 3 | Status: SHIPPED | OUTPATIENT
Start: 2018-06-26 | End: 2019-06-05 | Stop reason: SDUPTHER

## 2018-06-26 RX ORDER — LANSOPRAZOLE 30 MG/1
CAPSULE, DELAYED RELEASE ORAL
Qty: 90 CAP | Refills: 0 | Status: SHIPPED | OUTPATIENT
Start: 2018-06-26 | End: 2018-08-09 | Stop reason: SDUPTHER

## 2018-07-09 RX ORDER — METOPROLOL SUCCINATE 25 MG/1
25 TABLET, EXTENDED RELEASE ORAL DAILY
Qty: 90 TAB | Refills: 3 | Status: CANCELLED | OUTPATIENT
Start: 2018-07-09

## 2018-07-10 ENCOUNTER — PATIENT OUTREACH (OUTPATIENT)
Dept: FAMILY MEDICINE CLINIC | Age: 83
End: 2018-07-10

## 2018-07-10 RX ORDER — GEMFIBROZIL 600 MG/1
600 TABLET, FILM COATED ORAL DAILY
Qty: 30 TAB | Refills: 0 | Status: SHIPPED | OUTPATIENT
Start: 2018-07-10 | End: 2018-08-09 | Stop reason: SDUPTHER

## 2018-07-10 NOTE — PROGRESS NOTES
Transitions of Care Coordination  Follow-Up    Date/Time:  7/10/2018 2:34 PM     NN contacted patient for Transitions of Care Coordination  follow up. Spoke to patient. Introduced self/role and reason for call. Patient reported: \"I'm good today. I occasionally get short of breath, but it don't stay for long. The swelling on my right ankle has gone down. My right knee though sometimes salud, so I have to be careful standing up so I don't fall. \"    Pertinent negatives: pain     Medications:   New medications since last outreach: no  Does patient need refills on any medications: no  Medication changes since last outreach (dose adjustments or discontinued meds): no    Home Health company: none    Barriers to care? lack of knowledge about disease    Specialist appointments since last outreach? no    Patient reminded that there are physicians on call 24 hours a day / 7 days a week (M-F 5pm to 8am and from Friday 5pm until Monday 8a for the weekend) should the patient have questions or concerns. Future Appointments  Date Time Provider Rock Sylvester   8/9/2018 4:20 PM Millicent Sandoval MD [facility]   11/13/2018 1:40 PM Rogelio Nix  [facility] provides transportation to [facility]. Goals     None        Patient has graduated from the Transitions of Care Coordination  program on 7/10/18.

## 2018-07-12 RX ORDER — CHLORTHALIDONE 25 MG/1
25 TABLET ORAL DAILY
Qty: 90 TAB | Refills: 3 | Status: SHIPPED | OUTPATIENT
Start: 2018-07-12 | End: 2019-07-22 | Stop reason: SDUPTHER

## 2018-07-20 RX ORDER — METOPROLOL SUCCINATE 25 MG/1
25 TABLET, EXTENDED RELEASE ORAL DAILY
Qty: 30 TAB | Refills: 0 | Status: SHIPPED | OUTPATIENT
Start: 2018-07-20 | End: 2018-08-22 | Stop reason: SDUPTHER

## 2018-07-20 RX ORDER — METOPROLOL SUCCINATE 25 MG/1
25 TABLET, EXTENDED RELEASE ORAL DAILY
Qty: 90 TAB | Refills: 3 | Status: SHIPPED | OUTPATIENT
Start: 2018-07-20 | End: 2018-07-20 | Stop reason: SDUPTHER

## 2018-07-30 RX ORDER — TERAZOSIN 5 MG/1
CAPSULE ORAL
Qty: 90 CAP | Refills: 3 | Status: SHIPPED | OUTPATIENT
Start: 2018-07-30 | End: 2018-08-09 | Stop reason: SDUPTHER

## 2018-08-09 ENCOUNTER — OFFICE VISIT (OUTPATIENT)
Dept: FAMILY MEDICINE CLINIC | Age: 83
End: 2018-08-09

## 2018-08-09 VITALS
RESPIRATION RATE: 16 BRPM | WEIGHT: 129 LBS | BODY MASS INDEX: 22.86 KG/M2 | HEIGHT: 63 IN | DIASTOLIC BLOOD PRESSURE: 74 MMHG | OXYGEN SATURATION: 95 % | TEMPERATURE: 98.4 F | SYSTOLIC BLOOD PRESSURE: 132 MMHG | HEART RATE: 60 BPM

## 2018-08-09 DIAGNOSIS — R39.15 URINARY URGENCY: ICD-10-CM

## 2018-08-09 DIAGNOSIS — R06.09 OTHER FORM OF DYSPNEA: Primary | ICD-10-CM

## 2018-08-09 LAB
BILIRUB UR QL STRIP: NEGATIVE
GLUCOSE UR-MCNC: NEGATIVE MG/DL
KETONES P FAST UR STRIP-MCNC: NEGATIVE MG/DL
PH UR STRIP: 6 [PH] (ref 4.6–8)
PROT UR QL STRIP: NEGATIVE
SP GR UR STRIP: 1.01 (ref 1–1.03)
UA UROBILINOGEN AMB POC: NORMAL (ref 0.2–1)
URINALYSIS CLARITY POC: CLEAR
URINALYSIS COLOR POC: YELLOW
URINE BLOOD POC: NEGATIVE
URINE LEUKOCYTES POC: NEGATIVE
URINE NITRITES POC: NEGATIVE

## 2018-08-09 RX ORDER — LANSOPRAZOLE 30 MG/1
CAPSULE, DELAYED RELEASE ORAL
Qty: 90 CAP | Refills: 3 | Status: SHIPPED | OUTPATIENT
Start: 2018-08-09 | End: 2018-08-27 | Stop reason: SDUPTHER

## 2018-08-09 RX ORDER — ACETAMINOPHEN 500 MG
TABLET ORAL
COMMUNITY

## 2018-08-09 RX ORDER — GEMFIBROZIL 600 MG/1
600 TABLET, FILM COATED ORAL DAILY
Qty: 90 TAB | Refills: 3 | Status: SHIPPED | OUTPATIENT
Start: 2018-08-09 | End: 2018-08-27 | Stop reason: SDUPTHER

## 2018-08-09 NOTE — PROGRESS NOTES
1. Have you been to the ER, urgent care clinic since your last visit? Hospitalized since your last visit? no    2. Have you seen or consulted any other health care providers outside of the [facility] since your last visit? Include any pap smears or colon screening.  no

## 2018-08-09 NOTE — MR AVS SNAPSHOT
[address] [facility], [place] 
 
 
 [address], [place] [address] [address] 
[phone] Patient: Irene Ledesma MRN: [id number] Visit Information Date & Time Provider Department Dept. Phone Encounter #  
 8/9/2018  4:20 PM Cristina Hough, [address] [place] [address] [phone] [id number] Follow-up Instructions Return in about 3 months (around 11/9/2018) for htn/. Your Appointments 11/13/2018  1:40 PM  
Follow Up with Haja Guillen MD  
[facility] [place] [id number] ([facility]) Appt Note: 1 year follow up [place] [phone]  
[phone] [facility] [address] Upcoming Health Maintenance Date Due HEMOGLOBIN A1C Q6M 8/14/2017 MICROALBUMIN Q1 2/14/2018 LIPID PANEL Q1 2/14/2018 FOOT EXAM Q1 3/6/2018 MEDICARE YEARLY EXAM 3/14/2018 Influenza Age 5 to Adult 9/19/2018* EYE EXAM RETINAL OR DILATED Q1 12/4/2018 GLAUCOMA SCREENING Q2Y 12/4/2019 DTaP/Tdap/Td series (2 - Td) 8/16/2026 *Topic was postponed. The date shown is not the original due date. Allergies as of 8/9/2018  Review Complete On: 8/9/2018 By: Yaneth Alvarez LPN No Known Allergies Current Immunizations  Never Reviewed No immunizations on file. Not reviewed this visit You Were Diagnosed With   
  
 Codes Comments Other form of dyspnea    -  Primary ICD-10-CM: R06.09 
ICD-9-CM: 786.09 Urinary urgency     ICD-10-CM: R39.15 ICD-9-CM: 382.39 Vitals BP Pulse Temp Resp Height(growth percentile) Weight(growth percentile) 132/74 (BP 1 Location: Right arm, BP Patient Position: Sitting) 60 98.4 °F (36.9 °C) (Oral) 16 5' 3\" (1.6 m) 129 lb (58.5 kg) SpO2 BMI OB Status Smoking Status 95% 22.85 kg/m2 Hysterectomy Never Smoker BMI and BSA Data Body Mass Index Body Surface Area 22.85 kg/m 2 1.61 m 2 Preferred Pharmacy Pharmacy Name Phone [facility], [address] 240 [facility] [address] [phone] Your Updated Medication List  
  
   
This list is accurate as of 8/9/18  6:00 PM.  Always use your most recent med list.  
  
  
  
  
 aspirin 81 mg tablet Take 1 Tab by mouth daily. chlorthalidone 25 mg tablet Commonly known as:  Minor Median Take 1 Tab by mouth daily. digoxin 0.125 mg tablet Commonly known as:  LANOXIN  
TAKE 1 TABLET DAILY gemfibrozil 600 mg tablet Commonly known as:  LOPID Take 1 Tab by mouth daily. glucose blood VI test strips strip Commonly known as:  ACCU-CHEK COMPACT TEST  
by Does Not Apply route. Use as directed  
  
 lansoprazole 30 mg capsule Commonly known as:  PREVACID TAKE 1 CAPSULE DAILY       (BEFORE BREAKFAST)  
  
 lisinopril 20 mg tablet Commonly known as:  [place] Take 1 Tab by mouth daily. metFORMIN 500 mg tablet Commonly known as:  GLUCOPHAGE  
TAKE 1 TABLET BY MOUTH TWICE A DAY WITH FOOD  
  
 metoprolol succinate 25 mg XL tablet Commonly known as:  TOPROL-XL Take 1 Tab by mouth daily. mirabegron ER 25 mg ER tablet Commonly known as:  MYRBETRIQ Take 1 Tab by mouth daily. potassium chloride 20 mEq tablet Commonly known as:  K-DUR, KLOR-CON Take 1 Tab by mouth daily. terazosin 5 mg capsule Commonly known as:  HYTRIN Take 1 Cap by mouth nightly. TYLENOL EXTRA STRENGTH 500 mg tablet Generic drug:  acetaminophen Take  by mouth every six (6) hours as needed for Pain. Prescriptions Sent to Pharmacy Refills  
 gemfibrozil (LOPID) 600 mg tablet 3 Sig: Take 1 Tab by mouth daily. Class: Normal  
 Pharmacy: [address] [facility], [address] [facility] #: [phone]  Route: Oral  
 lansoprazole (PREVACID) 30 mg capsule 3  
 Sig: TAKE 1 CAPSULE DAILY       (BEFORE BREAKFAST) Class: Normal  
 Pharmacy: [address] [facility], [address] [place] Ph #: [phone]  
 mirabegron ER (MYRBETRIQ) 25 mg ER tablet 3 Sig: Take 1 Tab by mouth daily. Class: Normal  
 Pharmacy: [address] , [address] Ph #: [phone] Route: Oral  
  
We Performed the Following AMB POC URINALYSIS DIP STICK AUTO W/ MICRO [85440 CPT(R)] Follow-up Instructions Return in about 3 months (around 11/9/2018) for htn/.  
  
  
Patient Instructions Painful Urination (Dysuria): Care Instructions Your Care Instructions Burning pain with urination (dysuria) is a common symptom of a urinary tract infection or other urinary problems. The bladder may become inflamed. This can cause pain when the bladder fills and empties. You may also feel pain if the tube that carries urine from the bladder to the outside of the body (urethra) gets irritated or infected. Sexually transmitted infections (STIs) also may cause pain when you urinate. Sometimes the pain can be caused by things other than an infection. The urethra can be irritated by soaps, perfumes, or foreign objects in the urethra. Kidney stones can cause pain when they pass through the urethra. The cause may be hard to find. You may need tests. Treatment for painful urination depends on the cause. Follow-up care is a key part of your treatment and safety. Be sure to make and go to all appointments, and call your doctor if you are having problems. It's also a good idea to know your test results and keep a list of the medicines you take. How can you care for yourself at home? · Drink extra water for the next day or two. This will help make the urine less concentrated. (If you have kidney, heart, or liver disease and have to limit fluids, talk with your doctor before you increase the amount of fluids you drink.) · Avoid drinks that are carbonated or have caffeine. They can irritate the bladder. · Urinate often. Try to empty your bladder each time. For women: · Urinate right after you have sex. · After going to the bathroom, wipe from front to back. · Avoid douches, bubble baths, and feminine hygiene sprays. And avoid other feminine hygiene products that have deodorants. When should you call for help? Call your doctor now or seek immediate medical care if: 
  · You have new symptoms, such as fever, nausea, or vomiting.  
  · You have new or worse symptoms of a urinary problem. For example: ¨ You have blood or pus in your urine. ¨ You have chills or body aches. ¨ It hurts worse to urinate. ¨ You have groin or belly pain. ¨ You have pain in your back just below your rib cage (the flank area).  
 Watch closely for changes in your health, and be sure to contact your doctor if you have any problems. Where can you learn more? Go to http://juju-karol.info/. Enter C591 in the search box to learn more about \"Painful Urination (Dysuria): Care Instructions. \" Current as of: May 12, 2017 Content Version: 11.7 © 4748-1858 [employer]. Care instructions adapted under license by [employer] (which disclaims liability or warranty for this information). If you have questions about a medical condition or this instruction, always ask your healthcare professional. [place] [address] any warranty or liability for your use of this information. Introducing [facility]! [employer] introduces [employer] patient portal. Now you can access parts of your medical record, email your doctor's office, and request medication refills online. 1. In your internet browser, go to [email or web address] 2. Click on the First Time User? Click Here link in the Sign In box. You will see the New Member Sign Up page. 3. Enter your Silver Curve Access Code exactly as it appears below. You will not need to use this code after youve completed the sign-up process. If you do not sign up before the expiration date, you must request a new code. · Silver Curve Access Code: [email or web address] Expires: 10/7/2018  5:18 AM 
 
4. Enter the last four digits of your Social Security Number (xxxx) and Date of Birth (mm/dd/yyyy) as indicated and click Submit. You will be taken to the next sign-up page. 5. Create a Silver Curve ID. This will be your Silver Curve login ID and cannot be changed, so think of one that is secure and easy to remember. 6. Create a Silver Curve password. You can change your password at any time. 7. Enter your Password Reset Question and Answer. This can be used at a later time if you forget your password. 8. Enter your e-mail address. You will receive e-mail notification when new information is available in [address]. 9. Click Sign Up. You can now view and download portions of your medical record. 10. Click the Download Summary menu link to download a portable copy of your medical information. If you have questions, please visit the Frequently Asked Questions section of the [employer] website. Remember, [employer] is NOT to be used for urgent needs. For medical emergencies, dial 911. Now available from your iPhone and Android! Please provide this summary of care documentation to your next provider. Your primary care clinician is listed as [address]. If you have any questions after today's visit, please call [phone].

## 2018-08-09 NOTE — PATIENT INSTRUCTIONS
Painful Urination (Dysuria): Care Instructions  Your Care Instructions  Burning pain with urination (dysuria) is a common symptom of a urinary tract infection or other urinary problems. The bladder may become inflamed. This can cause pain when the bladder fills and empties. You may also feel pain if the tube that carries urine from the bladder to the outside of the body (urethra) gets irritated or infected. Sexually transmitted infections (STIs) also may cause pain when you urinate. Sometimes the pain can be caused by things other than an infection. The urethra can be irritated by soaps, perfumes, or foreign objects in the urethra. Kidney stones can cause pain when they pass through the urethra. The cause may be hard to find. You may need tests. Treatment for painful urination depends on the cause. Follow-up care is a key part of your treatment and safety. Be sure to make and go to all appointments, and call your doctor if you are having problems. It's also a good idea to know your test results and keep a list of the medicines you take. How can you care for yourself at home? · Drink extra water for the next day or two. This will help make the urine less concentrated. (If you have kidney, heart, or liver disease and have to limit fluids, talk with your doctor before you increase the amount of fluids you drink.)  · Avoid drinks that are carbonated or have caffeine. They can irritate the bladder. · Urinate often. Try to empty your bladder each time. For women:  · Urinate right after you have sex. · After going to the bathroom, wipe from front to back. · Avoid douches, bubble baths, and feminine hygiene sprays. And avoid other feminine hygiene products that have deodorants. When should you call for help? Call your doctor now or seek immediate medical care if:    · You have new symptoms, such as fever, nausea, or vomiting.     · You have new or worse symptoms of a urinary problem.  For example:  Shamar Agudelo have blood or pus in your urine. ¨ You have chills or body aches. ¨ It hurts worse to urinate. ¨ You have groin or belly pain. ¨ You have pain in your back just below your rib cage (the flank area).    Watch closely for changes in your health, and be sure to contact your doctor if you have any problems. Where can you learn more? Go to http://juju-karol.info/. Enter Q785 in the search box to learn more about \"Painful Urination (Dysuria): Care Instructions. \"  Current as of: May 12, 2017  Content Version: 11.7  © 1123-4897 [employer]. Care instructions adapted under license by [employer] (which disclaims liability or warranty for this information). If you have questions about a medical condition or this instruction, always ask your healthcare professional. [address] any warranty or liability for your use of this information.

## 2018-08-09 NOTE — PROGRESS NOTES
HISTORY OF PRESENT ILLNESS  Augie Espinoza is a 80 y.o. female. HPI  Patient is here today for follow up on: Dyspnea on Exertion / Urinary Urgency    Dyspnea: pt states that her SOB is better; Has sx intermittently;     Urinary Urgency:  Pt would like to go back on myrbetriq. She was on med before. She does not recall exactly  how well med worked. She does drink green tea. States her urine is yellow. Current Outpatient Prescriptions:     acetaminophen (TYLENOL EXTRA STRENGTH) 500 mg tablet, Take  by mouth every six (6) hours as needed for Pain., Disp: , Rfl:     gemfibrozil (LOPID) 600 mg tablet, Take 1 Tab by mouth daily. , Disp: 90 Tab, Rfl: 3    lansoprazole (PREVACID) 30 mg capsule, TAKE 1 CAPSULE DAILY       (BEFORE BREAKFAST), Disp: 90 Cap, Rfl: 3    mirabegron ER (MYRBETRIQ) 25 mg ER tablet, Take 1 Tab by mouth daily. , Disp: 90 Tab, Rfl: 3    metoprolol succinate (TOPROL-XL) 25 mg XL tablet, Take 1 Tab by mouth daily. , Disp: 30 Tab, Rfl: 0    metFORMIN (GLUCOPHAGE) 500 mg tablet, TAKE 1 TABLET BY MOUTH TWICE A DAY WITH FOOD, Disp: 60 Tab, Rfl: 3    chlorthalidone (HYGROTEN) 25 mg tablet, Take 1 Tab by mouth daily. , Disp: 90 Tab, Rfl: 3    digoxin (LANOXIN) 0.125 mg tablet, TAKE 1 TABLET DAILY, Disp: 90 Tab, Rfl: 3    lisinopril (PRINIVIL, ZESTRIL) 20 mg tablet, Take 1 Tab by mouth daily. , Disp: 90 Tab, Rfl: 3    terazosin (HYTRIN) 5 mg capsule, Take 1 Cap by mouth nightly., Disp: 90 Cap, Rfl: 3    potassium chloride (K-DUR, KLOR-CON) 20 mEq tablet, Take 1 Tab by mouth daily. , Disp: 90 Tab, Rfl: 3    aspirin 81 mg tablet, Take 1 Tab by mouth daily. , Disp: 1 Tab, Rfl: 0    glucose blood VI test strips (ACCU-CHEK COMPACT TEST) strip, by Does Not Apply route. Use as directed, Disp: 1 Package, Rfl: 11     PMH,  Meds, Allergies, Family History, Social history reviewed      Review of Systems   Constitutional: Negative for chills and fever.         Has had some hot flashes   Cardiovascular: Negative for chest pain and palpitations. Physical Exam   Constitutional: She appears well-developed and well-nourished. No distress. Cardiovascular: Normal rate and regular rhythm. Exam reveals no gallop and no friction rub. No murmur heard. Pulmonary/Chest: Breath sounds normal. No respiratory distress. She has no wheezes. She has no rales. Musculoskeletal: She exhibits no edema. Nursing note and vitals reviewed. Visit Vitals    /74 (BP 1 Location: Right arm, BP Patient Position: Sitting)    Pulse 60    Temp 98.4 °F (36.9 °C) (Oral)    Resp 16    Ht 5' 3\" (1.6 m)    Wt 129 lb (58.5 kg)    SpO2 95%    BMI 22.85 kg/m2     ua noted    ASSESSMENT and PLAN    ICD-10-CM ICD-9-CM    1. Other form of dyspnea R06.09 786.09    2. Urinary urgency R39.15 788.63 AMB POC URINALYSIS DIP STICK AUTO W/ MICRO       As above,   above all stable unless otherwise noted   treatment plan as listed below  Orders Placed This Encounter    AMB POC URINALYSIS DIP STICK AUTO W/ MICRO    acetaminophen (TYLENOL EXTRA STRENGTH) 500 mg tablet    gemfibrozil (LOPID) 600 mg tablet    lansoprazole (PREVACID) 30 mg capsule    mirabegron ER (MYRBETRIQ) 25 mg ER tablet     Resume myrbetric  Refilled meds needed  Follow-up Disposition:  Return in about 3 months (around 11/9/2018) for htn/. An After Visit Summary was printed and given to the patient. This has been fully explained to the patient, who indicates understanding.

## 2018-08-27 NOTE — TELEPHONE ENCOUNTER
Santosh Larsen from [employer] called for a 90 day supply refill of pt's medication. ID: [id number]    Phone: [phone] [address]  Fax: [phone]      Requested Prescriptions     Pending Prescriptions Disp Refills    gemfibrozil (LOPID) 600 mg tablet 90 Tab 3     Sig: Take 1 Tab by mouth daily.  lansoprazole (PREVACID) 30 mg capsule 90 Cap 3     Sig: TAKE 1 CAPSULE DAILY       (BEFORE BREAKFAST)    mirabegron ER (MYRBETRIQ) 25 mg ER tablet 90 Tab 3     Sig: Take 1 Tab by mouth daily.

## 2018-08-28 RX ORDER — GEMFIBROZIL 600 MG/1
600 TABLET, FILM COATED ORAL DAILY
Qty: 90 TAB | Refills: 3 | Status: SHIPPED | OUTPATIENT
Start: 2018-08-28 | End: 2019-07-22 | Stop reason: SDUPTHER

## 2018-08-28 RX ORDER — LANSOPRAZOLE 30 MG/1
CAPSULE, DELAYED RELEASE ORAL
Qty: 90 CAP | Refills: 3 | Status: SHIPPED | OUTPATIENT
Start: 2018-08-28 | End: 2019-07-22 | Stop reason: SDUPTHER

## 2018-09-21 NOTE — TELEPHONE ENCOUNTER
Requested Prescriptions     Pending Prescriptions Disp Refills    metoprolol succinate (TOPROL-XL) 25 mg XL tablet 90 Tab 3     Sig: Take 1 Tab by mouth daily. Please assist with Metoprolol.  Prescription was eprescribed to [facility] order 06/11/2018 and per Bennie Barrett (I spoke with them) they are not her authorized mail order pharmacy, [address] [place] [address] is    Pt is out of this medication    Please send short supply to [facility]  High      Please also send regular 90 day supply to [facility] mail order General Sunscreen Counseling: I recommended a broad spectrum sunscreen with a SPF of 30 or higher. Detail Level: Zone

## 2018-10-24 RX ORDER — METFORMIN HYDROCHLORIDE 500 MG/1
TABLET ORAL
Qty: 180 TAB | Refills: 2 | Status: SHIPPED | OUTPATIENT
Start: 2018-10-24 | End: 2019-07-22 | Stop reason: SDUPTHER

## 2018-11-04 ENCOUNTER — HOSPITAL ENCOUNTER (EMERGENCY)
Age: 83
Discharge: HOME | End: 2018-11-04
Attending: EMERGENCY MEDICINE
Payer: MEDICARE

## 2018-11-04 ENCOUNTER — APPOINTMENT (OUTPATIENT)
Dept: CT IMAGING | Age: 83
End: 2018-11-04
Attending: EMERGENCY MEDICINE
Payer: MEDICARE

## 2018-11-04 VITALS
HEART RATE: 67 BPM | SYSTOLIC BLOOD PRESSURE: 122 MMHG | RESPIRATION RATE: 20 BRPM | TEMPERATURE: 98.9 F | OXYGEN SATURATION: 95 % | DIASTOLIC BLOOD PRESSURE: 72 MMHG

## 2018-11-04 DIAGNOSIS — R10.31 ABDOMINAL PAIN, RIGHT LOWER QUADRANT: Primary | ICD-10-CM

## 2018-11-04 LAB
ALBUMIN SERPL-MCNC: 3.3 G/DL (ref 3.4–5)
ALBUMIN/GLOB SERPL: 0.9 {RATIO} (ref 0.8–1.7)
ALP SERPL-CCNC: 81 U/L (ref 45–117)
ALT SERPL-CCNC: 12 U/L (ref 13–56)
ANION GAP SERPL CALC-SCNC: 10 MMOL/L (ref 3–18)
APPEARANCE UR: CLEAR
AST SERPL-CCNC: 13 U/L (ref 15–37)
BASOPHILS # BLD: 0 K/UL (ref 0–0.1)
BASOPHILS NFR BLD: 0 % (ref 0–2)
BILIRUB SERPL-MCNC: 0.2 MG/DL (ref 0.2–1)
BILIRUB UR QL: NEGATIVE
BUN SERPL-MCNC: 22 MG/DL (ref 7–18)
BUN/CREAT SERPL: 21 (ref 12–20)
CALCIUM SERPL-MCNC: 9.6 MG/DL (ref 8.5–10.1)
CHLORIDE SERPL-SCNC: 106 MMOL/L (ref 100–108)
CO2 SERPL-SCNC: 27 MMOL/L (ref 21–32)
COLOR UR: YELLOW
CREAT SERPL-MCNC: 1.06 MG/DL (ref 0.6–1.3)
DIFFERENTIAL METHOD BLD: ABNORMAL
EOSINOPHIL # BLD: 0.2 K/UL (ref 0–0.4)
EOSINOPHIL NFR BLD: 2 % (ref 0–5)
EPITH CASTS URNS QL MICRO: NORMAL /LPF (ref 0–5)
ERYTHROCYTE [DISTWIDTH] IN BLOOD BY AUTOMATED COUNT: 14.9 % (ref 11.6–14.5)
GLOBULIN SER CALC-MCNC: 3.7 G/DL (ref 2–4)
GLUCOSE SERPL-MCNC: 97 MG/DL (ref 74–99)
GLUCOSE UR STRIP.AUTO-MCNC: NEGATIVE MG/DL
HCT VFR BLD AUTO: 34.8 % (ref 35–45)
HGB BLD-MCNC: 10.6 G/DL (ref 12–16)
HGB UR QL STRIP: NEGATIVE
KETONES UR QL STRIP.AUTO: NEGATIVE MG/DL
LEUKOCYTE ESTERASE UR QL STRIP.AUTO: ABNORMAL
LYMPHOCYTES # BLD: 1.6 K/UL (ref 0.9–3.6)
LYMPHOCYTES NFR BLD: 22 % (ref 21–52)
MCH RBC QN AUTO: 26.1 PG (ref 24–34)
MCHC RBC AUTO-ENTMCNC: 30.5 G/DL (ref 31–37)
MCV RBC AUTO: 85.7 FL (ref 74–97)
MONOCYTES # BLD: 0.6 K/UL (ref 0.05–1.2)
MONOCYTES NFR BLD: 9 % (ref 3–10)
NEUTS SEG # BLD: 4.6 K/UL (ref 1.8–8)
NEUTS SEG NFR BLD: 67 % (ref 40–73)
NITRITE UR QL STRIP.AUTO: NEGATIVE
PH UR STRIP: 6 [PH] (ref 5–8)
PLATELET # BLD AUTO: 257 K/UL (ref 135–420)
PMV BLD AUTO: 10.2 FL (ref 9.2–11.8)
POTASSIUM SERPL-SCNC: 3.9 MMOL/L (ref 3.5–5.5)
PROT SERPL-MCNC: 7 G/DL (ref 6.4–8.2)
PROT UR STRIP-MCNC: NEGATIVE MG/DL
RBC # BLD AUTO: 4.06 M/UL (ref 4.2–5.3)
RBC #/AREA URNS HPF: NORMAL /HPF (ref 0–5)
SODIUM SERPL-SCNC: 143 MMOL/L (ref 136–145)
SP GR UR REFRACTOMETRY: 1.02 (ref 1–1.03)
UROBILINOGEN UR QL STRIP.AUTO: 1 EU/DL (ref 0.2–1)
WBC # BLD AUTO: 7 K/UL (ref 4.6–13.2)
WBC URNS QL MICRO: NORMAL /HPF (ref 0–4)

## 2018-11-04 PROCEDURE — 80053 COMPREHEN METABOLIC PANEL: CPT | Performed by: PHYSICIAN ASSISTANT

## 2018-11-04 PROCEDURE — 87086 URINE CULTURE/COLONY COUNT: CPT | Performed by: EMERGENCY MEDICINE

## 2018-11-04 PROCEDURE — 81001 URINALYSIS AUTO W/SCOPE: CPT | Performed by: PHYSICIAN ASSISTANT

## 2018-11-04 PROCEDURE — 74176 CT ABD & PELVIS W/O CONTRAST: CPT

## 2018-11-04 PROCEDURE — 99283 EMERGENCY DEPT VISIT LOW MDM: CPT

## 2018-11-04 PROCEDURE — 85025 COMPLETE CBC W/AUTO DIFF WBC: CPT | Performed by: PHYSICIAN ASSISTANT

## 2018-11-04 RX ORDER — POLYETHYLENE GLYCOL 3350 17 G/17G
17 POWDER, FOR SOLUTION ORAL DAILY
Qty: 238 G | Refills: 0 | Status: SHIPPED | OUTPATIENT
Start: 2018-11-04 | End: 2020-01-01 | Stop reason: ALTCHOICE

## 2018-11-04 RX ORDER — POLYETHYLENE GLYCOL 3350 17 G/17G
17 POWDER, FOR SOLUTION ORAL DAILY
Qty: 238 G | Refills: 0 | Status: SHIPPED | OUTPATIENT
Start: 2018-11-04 | End: 2018-11-04

## 2018-11-04 RX ORDER — CEPHALEXIN 500 MG/1
500 CAPSULE ORAL 3 TIMES DAILY
Qty: 15 CAP | Refills: 0 | Status: SHIPPED | OUTPATIENT
Start: 2018-11-04 | End: 2018-11-09

## 2018-11-04 RX ORDER — CEPHALEXIN 500 MG/1
500 CAPSULE ORAL 3 TIMES DAILY
Qty: 15 CAP | Refills: 0 | Status: SHIPPED | OUTPATIENT
Start: 2018-11-04 | End: 2018-11-04

## 2018-11-04 NOTE — ED TRIAGE NOTES
Pt c/o right groin pain that radiates down her right leg for 2 days. Has hx of sciatica. Also c/o constipation and occ SOB. Pt is in no distress while sitting in wheelchair

## 2018-11-04 NOTE — ED PROVIDER NOTES
EMERGENCY DEPARTMENT HISTORY AND PHYSICAL EXAM 
 
2:38 PM 
 
 
Date: 11/4/2018 Patient Name: Bessie Urena History of Presenting Illness Chief Complaint Patient presents with  Groin Pain  Leg Pain  Buttocks pain  Abdominal Pain  Constipation History Provided By: Patient, Patient's Son and Patient's niece Chief Complaint: Abd pain Duration: 3 to 4 Days Timing:  Constant Location: RLQ Quality: Not obtained Severity: Moderate Modifying Factors: Worse with general movement. Associated Symptoms: loss of appetite, right foot pain, SOB, right flank pain, constipation, weight loss Additional History (Context): 3:20 PM Bessie Urena is a 80 y.o. female with h/o HTN, DM, GERD, hysterectomy, and other noted PMHx who presents to ED complaining of constant, moderate, RLQ abd pain onset 3 to 4 days, with associated right flank pain, loss of appetite, SOB, right foot pain, constipation, weight loss. The patient reports that the abd pain radiates from the right side to her \"buttocks\" and down to the right foot. She notes that the right flank pain is worse when sitting up, and the abd pain is worse with general movement. Regarding the constipation, the patient states that she has been \"straining. \" She notes that she started drinking prune juice. The patient claims that she not had a BM today, but does so \"every few days. \" She reports that she feels \"bloated like there's gas,\" and has been able to pass gas. The patient denies nausea, vomiting, and fever. Although the patient claims she usually eats \"3 meals a day,\" she has not had a Jessica meal\" since last week. The patient's son is concerned that the patient is eating \"very small meals. \" Per the patient's son, the patient's weight loss is not acute, and has instead been over Mildred Mulch few years. \" Per the patient's niece, the patient's loss of appetite is not a new symptom.  The patient also notes that after standing up, she sometimes has the urge to urinate. No other concerns or symptoms at this time. PCP: Edgardo Kelley MD 
 
 
 
Past History Past Medical History: 
Past Medical History:  
Diagnosis Date  Cardiac echocardiogram 2008 EF 60-65%. No RWMA. RVSP 35 mmHg. Mild AI. Mild MR. Mild TR. Mild PI.  Cardiac nuclear imaging test 2004 A-fib. No evidence of ischemia or prior infarction. EF 59%. No WMA.  Cardiovascular lower extremity venous duplex 10/05/2011 No deep vein or superficial thrombosis bilaterally. Bilateral calf veins w/calcific changes.  Diabetes ([address] 2006  Essential hypertension, benign  GERD (gastroesophageal reflux disease)  Hypercholesterolemia  Hypertension  Long term (current) use of anticoagulants 3/14/2011  Paroxysmal atrial fibrillation (HCC)  S/P total hysterectomy late  Past Surgical History: 
Past Surgical History:  
Procedure Laterality Date  HX CATARACT REMOVAL    
 bilateral  
 HX HYSTERECTOMY  late  Family History: 
Family History Problem Relation Age of Onset  Hypertension Mother  Heart Failure Father  at age 80 from CHF  Diabetes Sister  Diabetes Brother Social History: 
Social History Tobacco Use  Smoking status: Never Smoker  Smokeless tobacco: Never Used Substance Use Topics  Alcohol use: No  
 Drug use: No  
 
 
Allergies: 
No Known Allergies Review of Systems Review of Systems Constitutional: Positive for appetite change and unexpected weight change. Negative for activity change, fatigue and fever. HENT: Negative for congestion and rhinorrhea. Eyes: Negative for visual disturbance. Respiratory: Positive for shortness of breath. Cardiovascular: Negative for chest pain and palpitations. Gastrointestinal: Positive for abdominal pain and constipation. Negative for diarrhea, nausea and vomiting. Genitourinary: Positive for flank pain. Negative for dysuria and hematuria. Musculoskeletal: Positive for arthralgias. Negative for back pain. Skin: Negative for rash. Neurological: Negative for dizziness, weakness and light-headedness. All other systems reviewed and are negative. Physical Exam  
 
Visit Vitals /72 (BP 1 Location: Left arm) Pulse 67 Temp 98.9 °F (37.2 °C) Resp 20 SpO2 95% Physical Exam  
Constitutional: She is oriented to person, place, and time. She appears well-developed and well-nourished. No distress. HENT:  
Head: Normocephalic and atraumatic. Right Ear: External ear normal.  
Left Ear: External ear normal.  
Nose: Nose normal.  
Mouth/Throat: Oropharynx is clear and moist.  
Eyes: Conjunctivae and EOM are normal. Pupils are equal, round, and reactive to light. No scleral icterus. Neck: Normal range of motion. Neck supple. No JVD present. No tracheal deviation present. No thyromegaly present. Cardiovascular: Normal rate, regular rhythm, normal heart sounds and intact distal pulses. Exam reveals no gallop and no friction rub. No murmur heard. Pulmonary/Chest: Effort normal and breath sounds normal. She exhibits no tenderness. Abdominal: Soft. Bowel sounds are normal. She exhibits no distension. There is tenderness. There is no rebound and no guarding. Mild distension, RLQ pain without guarding Musculoskeletal: Normal range of motion. She exhibits no edema or tenderness. R hip with FROM without pain, negative pelvic rock, distal pulses are equal, no calf pain Lymphadenopathy:  
  She has no cervical adenopathy. Neurological: She is alert and oriented to person, place, and time. No cranial nerve deficit. Coordination normal.  
No sensory loss, Gait normal, Motor 5/5 Skin: Skin is warm and dry. Psychiatric: She has a normal mood and affect.  Her behavior is normal. Judgment and thought content normal.  
 Supportive family at the bedside Nursing note and vitals reviewed. Diagnostic Study Results Labs - Recent Results (from the past 12 hour(s)) CBC WITH AUTOMATED DIFF Collection Time: 11/04/18  3:05 PM  
Result Value Ref Range WBC 7.0 4.6 - 13.2 K/uL  
 RBC 4.06 (L) 4.20 - 5.30 M/uL  
 HGB 10.6 (L) 12.0 - 16.0 g/dL HCT 34.8 (L) 35.0 - 45.0 % MCV 85.7 74.0 - 97.0 FL  
 MCH 26.1 24.0 - 34.0 PG  
 MCHC 30.5 (L) 31.0 - 37.0 g/dL  
 RDW 14.9 (H) 11.6 - 14.5 % PLATELET 031 728 - 642 K/uL MPV 10.2 9.2 - 11.8 FL  
 NEUTROPHILS 67 40 - 73 % LYMPHOCYTES 22 21 - 52 % MONOCYTES 9 3 - 10 % EOSINOPHILS 2 0 - 5 % BASOPHILS 0 0 - 2 %  
 ABS. NEUTROPHILS 4.6 1.8 - 8.0 K/UL  
 ABS. LYMPHOCYTES 1.6 0.9 - 3.6 K/UL  
 ABS. MONOCYTES 0.6 0.05 - 1.2 K/UL  
 ABS. EOSINOPHILS 0.2 0.0 - 0.4 K/UL  
 ABS. BASOPHILS 0.0 0.0 - 0.1 K/UL  
 DF AUTOMATED METABOLIC PANEL, COMPREHENSIVE Collection Time: 11/04/18  3:05 PM  
Result Value Ref Range Sodium 143 136 - 145 mmol/L Potassium 3.9 3.5 - 5.5 mmol/L Chloride 106 100 - 108 mmol/L  
 CO2 27 21 - 32 mmol/L Anion gap 10 3.0 - 18 mmol/L Glucose 97 74 - 99 mg/dL BUN 22 (H) 7.0 - 18 MG/DL Creatinine 1.06 0.6 - 1.3 MG/DL  
 BUN/Creatinine ratio 21 (H) 12 - 20 GFR est AA 58 (L) >60 ml/min/1.73m2 GFR est non-AA 48 (L) >60 ml/min/1.73m2 Calcium 9.6 8.5 - 10.1 MG/DL Bilirubin, total 0.2 0.2 - 1.0 MG/DL  
 ALT (SGPT) 12 (L) 13 - 56 U/L  
 AST (SGOT) 13 (L) 15 - 37 U/L Alk. phosphatase 81 45 - 117 U/L Protein, total 7.0 6.4 - 8.2 g/dL Albumin 3.3 (L) 3.4 - 5.0 g/dL Globulin 3.7 2.0 - 4.0 g/dL A-G Ratio 0.9 0.8 - 1.7 Radiologic Studies -  
CT ABD PELV WO CONT Final Result CT ABD PELV: 
IMPRESSION: 
 
No acute abnormalities. Diverticulosis coli. No diverticulitis. Bilateral inguinal hernias containing fat. Granulomas in the spleen. A hypodensity laterally is likely benign finding like hemangioma or hamartoma. Cystocele. Calcifications aortic valve and possibly ascending aortic dilatation. Would 
correlate for any findings on physical examination suggesting aortic stenosis. AAA. Medical Decision Making I am the first provider for this patient. I reviewed the vital signs, available nursing notes, past medical history, past surgical history, family history and social history. Vital Signs-Reviewed the patient's vital signs. Pulse Oximetry Analysis -  95% on room air (Interpretation) WNL Cardiac Monitor: 
Rate: 67 Rhythm:  Normal Sinus Rhythm Records Reviewed: Nursing Notes (Time of Review: 2:38 PM) Provider Notes (Medical Decision Making): MDM Number of Diagnoses or Management Options Diagnosis management comments: Pt is a 79yo female with a hx of HTN, PAF, DM, hx of ESPERANZA presents with 4 days of RUQ pain with decreased appetite and constipation. It radiates to the R leg but seems to be localized to the RLQ on exam not to hip/pelvis. Will CT AP for intraabdominal process and avoid contrast if possible to prevent renal injury, follow UA/labs, then reevaluate. Saige Tay DO 3:07 PM 
 
 
 
Medications - No data to display ED Course: Progress Notes, Reevaluation, and Consults: 
Pt labs are reassuring and CT notes inguinal hernias. Pt UA has trace leuk esterase so will give keflex while sending urine culture. Will, also, give miralax for possible constipation. I reassured the family regarding the CT and do not feel aortic findings are emergent. Pt is to return if at all worsened or concerned and follow closely with PMD. Saige Tay, DO 5:35 PM 
  
 
Diagnosis Clinical Impression: 1. Abdominal pain, right lower quadrant Disposition: DC Follow-up Information None Medication List  
  
ASK your doctor about these medications   
aspirin 81 mg tablet Take 1 Tab by mouth daily. chlorthalidone 25 mg tablet Commonly known as:  Jeralyn Alt Take 1 Tab by mouth daily. digoxin 0.125 mg tablet Commonly known as:  LANOXIN 
TAKE 1 TABLET DAILY gemfibrozil 600 mg tablet Commonly known as:  LOPID Take 1 Tab by mouth daily. glucose blood VI test strips strip Commonly known as:  ACCU-CHEK COMPACT TEST 
by Does Not Apply route. Use as directed 
  
lansoprazole 30 mg capsule Commonly known as:  PREVACID TAKE 1 CAPSULE DAILY       (BEFORE BREAKFAST) 
  
lisinopril 20 mg tablet Commonly known as:  Antonieta Fossa Take 1 Tab by mouth daily. metFORMIN 500 mg tablet Commonly known as:  GLUCOPHAGE 
TAKE 1 TABLET BY MOUTH TWICE A DAY WITH FOOD 
  
metoprolol succinate 25 mg XL tablet Commonly known as:  TOPROL-XL 
TAKE 1 TABLET BY MOUTH EVERY DAY 
  
mirabegron ER 25 mg ER tablet Commonly known as:  MYRBETRIQ Take 1 Tab by mouth daily. potassium chloride 20 mEq tablet Commonly known as:  K-DUR, KLOR-CON Take 1 Tab by mouth daily. terazosin 5 mg capsule Commonly known as:  HYTRIN Take 1 Cap by mouth nightly. TYLENOL EXTRA STRENGTH 500 mg tablet Generic drug:  acetaminophen 
  
  
 
_______________________________ Attestations: 
Scribe Attestation Neeru Jaffe acting as a scribe for and in the presence of Aleena Loya MD     
November 04, 2018 at 2:38 PM 
    
Provider Attestation:     
I personally performed the services described in the documentation, reviewed the documentation, as recorded by the scribe in my presence, and it accurately and completely records my words and actions. November 04, 2018 at 2:38 PM - Aleena Loya MD   
______________________ 
_________

## 2018-11-04 NOTE — ED NOTES
Fidelina Pinto is a 80 y.o. female that was discharged in stable condition. The patients diagnosis, condition and treatment were explained to  patient and aftercare instructions were given. The patient verbalized understanding. Patient armband removed and shredded.

## 2018-11-04 NOTE — DISCHARGE INSTRUCTIONS

## 2018-11-06 LAB
BACTERIA SPEC CULT: NORMAL
SERVICE CMNT-IMP: NORMAL

## 2018-11-08 ENCOUNTER — HOSPITAL ENCOUNTER (EMERGENCY)
Age: 83
Discharge: STILL ADMITTED OR ADMINISTRATIVE | End: 2018-11-08
Attending: EMERGENCY MEDICINE

## 2018-11-18 RX ORDER — LISINOPRIL 20 MG/1
TABLET ORAL
Qty: 90 TAB | Refills: 3 | Status: SHIPPED | OUTPATIENT
Start: 2018-11-18 | End: 2019-01-08 | Stop reason: SDUPTHER

## 2019-01-08 ENCOUNTER — OFFICE VISIT (OUTPATIENT)
Dept: CARDIOLOGY CLINIC | Age: 84
End: 2019-01-08

## 2019-01-08 VITALS
HEIGHT: 63 IN | WEIGHT: 128 LBS | HEART RATE: 62 BPM | OXYGEN SATURATION: 96 % | BODY MASS INDEX: 22.68 KG/M2 | SYSTOLIC BLOOD PRESSURE: 90 MMHG | DIASTOLIC BLOOD PRESSURE: 60 MMHG

## 2019-01-08 DIAGNOSIS — I35.0 MILD AORTIC STENOSIS: ICD-10-CM

## 2019-01-08 DIAGNOSIS — I48.0 PAF (PAROXYSMAL ATRIAL FIBRILLATION) (HCC): Primary | ICD-10-CM

## 2019-01-08 DIAGNOSIS — I10 ESSENTIAL HYPERTENSION: ICD-10-CM

## 2019-01-08 NOTE — PROGRESS NOTES
HISTORY OF PRESENT ILLNESS  Karmen Krueger is a 80 y.o. female. HPI  She looks very good for her age of 80 going on 80. She has only mild dyspnea on exertion. She continues with occasional palpitation as a fluttering but no prolonged palpitation. She has had no chest pain, resting dyspnea, orthopnea, PND. She has had no dizziness or syncope. She denies any symptoms of TIA or amaurosis fugax. She just gave up driving last year. She has a history of hypertension and acute pulmonary edema in 1988 at which time she had normal cardiac catheterization and normal left ventricular systolic function with hyperdynamic contractions. There was evidence of left ventricular diastolic dysfunction at that time which was felt to be the etiology of her pulmonary edema.    She had the atrial fibrillation for which she was anticoagulated with Coumadin for a while until April 2012 when she was seen in my office when she was found to be in sinus rhythm. Because of the fact that she was in sinus rhythm and her age was in the mid 80s, a decision was made to discontinue Coumadin altogether. She has since remained in sinus rhythm. Review of Systems   Constitutional: Positive for malaise/fatigue. Negative for weight loss. HENT: Positive for hearing loss. Eyes: Negative for blurred vision and double vision. Respiratory: Positive for shortness of breath. Cardiovascular: Positive for palpitations. Negative for chest pain, orthopnea, claudication, leg swelling and PND. Gastrointestinal: Negative for blood in stool, heartburn and melena. Genitourinary: Negative for dysuria, frequency, hematuria and urgency. Musculoskeletal: Negative for back pain and joint pain. Skin: Negative for itching and rash. Neurological: Negative for dizziness, loss of consciousness and weakness. Psychiatric/Behavioral: Negative for depression and memory loss.        Physical Exam   Constitutional: She is oriented to person, place, and time. She appears well-developed and well-nourished. HENT:   Head: Normocephalic and atraumatic. Eyes: Conjunctivae are normal. Pupils are equal, round, and reactive to light. Neck: Normal range of motion. Neck supple. No JVD present. Cardiovascular: Normal rate, regular rhythm, S1 normal and S2 normal.  No extrasystoles are present. PMI is not displaced. Exam reveals no gallop and no friction rub. Murmur heard. Harsh early systolic murmur is present with a grade of 1/6 at the upper right sternal border radiating to the neck. Pulses:       Carotid pulses are 3+ on the right side with bruit, and 3+ on the left side with bruit. Pulmonary/Chest: Effort normal. She has no rales. Abdominal: Soft. There is no tenderness. Musculoskeletal: She exhibits no edema. Neurological: She is alert and oriented to person, place, and time. No cranial nerve deficit. Skin: Skin is warm and dry. Psychiatric: She has a normal mood and affect. Visit Vitals  BP 90/60   Pulse 62   Ht 5' 3\" (1.6 m)   Wt 58.1 kg (128 lb)   SpO2 96%   BMI 22.67 kg/m²       Past Medical History:   Diagnosis Date    Cardiac echocardiogram 03/21/2008    EF 60-65%. No RWMA. RVSP 35 mmHg. Mild AI. Mild MR. Mild TR. Mild PI.  Cardiac nuclear imaging test 03/29/2004    A-fib. No evidence of ischemia or prior infarction. EF 59%. No WMA.  Cardiovascular lower extremity venous duplex 10/05/2011    No deep vein or superficial thrombosis bilaterally. Bilateral calf veins w/calcific changes.     Diabetes ([address] 04/2006    Essential hypertension, benign     GERD (gastroesophageal reflux disease)     Hypercholesterolemia     Hypertension     Long term (current) use of anticoagulants 3/14/2011    Paroxysmal atrial fibrillation (HCC)     S/P total hysterectomy late 1960's       Social History     Socioeconomic History    Marital status:      Spouse name: Not on file    Number of children: Not on file    Years of education: Not on file    Highest education level: Not on file   Social Needs    Financial resource strain: Not on file    Food insecurity - worry: Not on file    Food insecurity - inability: Not on file    Transportation needs - medical: Not on file   [employer] needs - non-medical: Not on file   Occupational History    Not on file   Tobacco Use    Smoking status: Never Smoker    Smokeless tobacco: Never Used   Substance and Sexual Activity    Alcohol use: No    Drug use: No    Sexual activity: Not on file   Other Topics Concern    Not on file   Social History Narrative    Not on file       Family History   Problem Relation Age of Onset    Hypertension Mother     Heart Failure Father          at age 80 from CHF    Diabetes Sister     Diabetes Brother        Past Surgical History:   Procedure Laterality Date    HX CATARACT REMOVAL      bilateral    HX HYSTERECTOMY  late 's       Current Outpatient Medications   Medication Sig Dispense Refill    polyethylene glycol (MIRALAX) 17 gram/dose powder Take 17 g by mouth daily. 1 tablespoon with 8 oz of water daily 238 g 0    metFORMIN (GLUCOPHAGE) 500 mg tablet TAKE 1 TABLET BY MOUTH TWICE A DAY WITH FOOD 180 Tab 2    gemfibrozil (LOPID) 600 mg tablet Take 1 Tab by mouth daily. 90 Tab 3    lansoprazole (PREVACID) 30 mg capsule TAKE 1 CAPSULE DAILY       (BEFORE BREAKFAST) 90 Cap 3    mirabegron ER (MYRBETRIQ) 25 mg ER tablet Take 1 Tab by mouth daily. 90 Tab 3    metoprolol succinate (TOPROL-XL) 25 mg XL tablet TAKE 1 TABLET BY MOUTH EVERY DAY 30 Tab 6    acetaminophen (TYLENOL EXTRA STRENGTH) 500 mg tablet Take  by mouth every six (6) hours as needed for Pain.  chlorthalidone (HYGROTEN) 25 mg tablet Take 1 Tab by mouth daily. 90 Tab 3    digoxin (LANOXIN) 0.125 mg tablet TAKE 1 TABLET DAILY 90 Tab 3    lisinopril (PRINIVIL, ZESTRIL) 20 mg tablet Take 1 Tab by mouth daily.  90 Tab 3    terazosin (HYTRIN) 5 mg capsule Take 1 Cap by mouth nightly. 90 Cap 3    potassium chloride (K-DUR, KLOR-CON) 20 mEq tablet Take 1 Tab by mouth daily. 90 Tab 3    aspirin 81 mg tablet Take 1 Tab by mouth daily. 1 Tab 0    glucose blood VI test strips (ACCU-CHEK COMPACT TEST) strip by Does Not Apply route. Use as directed 1 Package 11       EKG: unchanged from previous tracings, normal sinus rhythm, RBBB, 1st degree AV block. ASSESSMENT and PLAN  Encounter Diagnoses   Name Primary?  PAF (paroxysmal atrial fibrillation) ([facility]) Yes    Essential hypertension     Mild aortic stenosis    She looks wonderful for her age of 80 going on 80. She was able to walk into the exam room and walk out using a cane, not a walker. She is still quite sharp. She has had no symptoms to indicate angina or cardiac decompensation. She has remained in sinus rhythm. Her blood pressure is somewhat low and at this point I would make her [place] on a prn basis. I am inclined to discontinue Digoxin altogether, but at this point, I would try not to change anything since she has done so well.

## 2019-01-08 NOTE — PROGRESS NOTES
Amador Espinoza presents today for   Chief Complaint   Patient presents with    Follow-up     1 year     Leg Swelling     right ankle swelling     Shortness of Breath     on exertion     Palpitations     racing palpitations every other day        Amador Espinoza preferred language for health care discussion is english/other. Is someone accompanying this pt? Yes, son     Is the patient using any DME equipment during [address]? Cane     Depression Screening:  PHQ over the last two weeks 8/9/2018   Little interest or pleasure in doing things Not at all   Feeling down, depressed, irritable, or hopeless Not at all   Total Score PHQ 2 0       Learning Assessment:  Learning Assessment 5/15/2014   PRIMARY LEARNER Patient   PRIMARY LANGUAGE ENGLISH   LEARNER PREFERENCE PRIMARY READING   ANSWERED BY patient   RELATIONSHIP SELF       Abuse Screening:  Abuse Screening Questionnaire 8/12/2015   Do you ever feel afraid of your partner? N   Are you in a relationship with someone who physically or mentally threatens you? N   Is it safe for you to go home? Y       Fall Risk  Fall Risk Assessment, last 12 mths 8/9/2018   Able to walk? Yes   Fall in past 12 months? No   Fall with injury? -   Number of falls in past 12 months -   Fall Risk Score -       Pt currently taking Antiplatelet therapy? ASA     Coordination of Care:  1. Have you been to the ER, urgent care clinic since your last visit? Hospitalized since your last visit? SOB and swelling 06/2018    2. Have you seen or consulted any other health care providers outside of the [address] [place] [address] since your last visit? Include any pap smears or colon screening.  No

## 2019-01-16 RX ORDER — POTASSIUM CHLORIDE 20 MEQ/1
20 TABLET, EXTENDED RELEASE ORAL DAILY
Qty: 90 TAB | Refills: 3 | Status: SHIPPED | OUTPATIENT
Start: 2019-01-16 | End: 2019-11-18 | Stop reason: SDUPTHER

## 2019-01-24 ENCOUNTER — HOSPITAL ENCOUNTER (EMERGENCY)
Age: 84
Discharge: HOME | End: 2019-01-24
Attending: EMERGENCY MEDICINE
Payer: MEDICARE

## 2019-01-24 ENCOUNTER — APPOINTMENT (OUTPATIENT)
Dept: CT IMAGING | Age: 84
End: 2019-01-24
Attending: EMERGENCY MEDICINE
Payer: MEDICARE

## 2019-01-24 VITALS
OXYGEN SATURATION: 100 % | HEIGHT: 63 IN | DIASTOLIC BLOOD PRESSURE: 78 MMHG | BODY MASS INDEX: 23.04 KG/M2 | SYSTOLIC BLOOD PRESSURE: 144 MMHG | RESPIRATION RATE: 18 BRPM | WEIGHT: 130 LBS | TEMPERATURE: 98.8 F | HEART RATE: 61 BPM

## 2019-01-24 DIAGNOSIS — M54.9 UPPER BACK PAIN ON RIGHT SIDE: Primary | ICD-10-CM

## 2019-01-24 DIAGNOSIS — S16.1XXA NECK STRAIN, INITIAL ENCOUNTER: ICD-10-CM

## 2019-01-24 LAB
ALBUMIN SERPL-MCNC: 3.2 G/DL (ref 3.4–5)
ALBUMIN/GLOB SERPL: 0.9 {RATIO} (ref 0.8–1.7)
ALP SERPL-CCNC: 74 U/L (ref 45–117)
ALT SERPL-CCNC: 8 U/L (ref 13–56)
ANION GAP SERPL CALC-SCNC: 12 MMOL/L (ref 3–18)
APPEARANCE UR: ABNORMAL
AST SERPL-CCNC: 10 U/L (ref 15–37)
BASOPHILS # BLD: 0 K/UL (ref 0–0.1)
BASOPHILS NFR BLD: 0 % (ref 0–2)
BILIRUB SERPL-MCNC: 0.3 MG/DL (ref 0.2–1)
BILIRUB UR QL: NEGATIVE
BUN SERPL-MCNC: 18 MG/DL (ref 7–18)
BUN/CREAT SERPL: 18 (ref 12–20)
CALCIUM SERPL-MCNC: 9.7 MG/DL (ref 8.5–10.1)
CHLORIDE SERPL-SCNC: 105 MMOL/L (ref 100–108)
CO2 SERPL-SCNC: 25 MMOL/L (ref 21–32)
COLOR UR: YELLOW
CREAT SERPL-MCNC: 0.98 MG/DL (ref 0.6–1.3)
DIFFERENTIAL METHOD BLD: ABNORMAL
EOSINOPHIL # BLD: 0.1 K/UL (ref 0–0.4)
EOSINOPHIL NFR BLD: 2 % (ref 0–5)
ERYTHROCYTE [DISTWIDTH] IN BLOOD BY AUTOMATED COUNT: 14.9 % (ref 11.6–14.5)
GLOBULIN SER CALC-MCNC: 3.7 G/DL (ref 2–4)
GLUCOSE SERPL-MCNC: 93 MG/DL (ref 74–99)
GLUCOSE UR STRIP.AUTO-MCNC: NEGATIVE MG/DL
HCT VFR BLD AUTO: 33.7 % (ref 35–45)
HGB BLD-MCNC: 10.3 G/DL (ref 12–16)
HGB UR QL STRIP: NEGATIVE
KETONES UR QL STRIP.AUTO: NEGATIVE MG/DL
LEUKOCYTE ESTERASE UR QL STRIP.AUTO: NEGATIVE
LIPASE SERPL-CCNC: 123 U/L (ref 73–393)
LYMPHOCYTES # BLD: 1.4 K/UL (ref 0.9–3.6)
LYMPHOCYTES NFR BLD: 23 % (ref 21–52)
MCH RBC QN AUTO: 26.3 PG (ref 24–34)
MCHC RBC AUTO-ENTMCNC: 30.6 G/DL (ref 31–37)
MCV RBC AUTO: 86.2 FL (ref 74–97)
MONOCYTES # BLD: 0.6 K/UL (ref 0.05–1.2)
MONOCYTES NFR BLD: 10 % (ref 3–10)
NEUTS SEG # BLD: 4.1 K/UL (ref 1.8–8)
NEUTS SEG NFR BLD: 65 % (ref 40–73)
NITRITE UR QL STRIP.AUTO: NEGATIVE
PH UR STRIP: 5 [PH] (ref 5–8)
PLATELET # BLD AUTO: 286 K/UL (ref 135–420)
PMV BLD AUTO: 9.7 FL (ref 9.2–11.8)
POTASSIUM SERPL-SCNC: 4.3 MMOL/L (ref 3.5–5.5)
PROT SERPL-MCNC: 6.9 G/DL (ref 6.4–8.2)
PROT UR STRIP-MCNC: NEGATIVE MG/DL
RBC # BLD AUTO: 3.91 M/UL (ref 4.2–5.3)
SODIUM SERPL-SCNC: 142 MMOL/L (ref 136–145)
SP GR UR REFRACTOMETRY: >1.03 (ref 1–1.03)
UROBILINOGEN UR QL STRIP.AUTO: 1 EU/DL (ref 0.2–1)
WBC # BLD AUTO: 6.3 K/UL (ref 4.6–13.2)

## 2019-01-24 PROCEDURE — 74011000250 HC RX REV CODE- 250: Performed by: EMERGENCY MEDICINE

## 2019-01-24 PROCEDURE — 81003 URINALYSIS AUTO W/O SCOPE: CPT

## 2019-01-24 PROCEDURE — 83690 ASSAY OF LIPASE: CPT

## 2019-01-24 PROCEDURE — 85025 COMPLETE CBC W/AUTO DIFF WBC: CPT

## 2019-01-24 PROCEDURE — 72125 CT NECK SPINE W/O DYE: CPT

## 2019-01-24 PROCEDURE — 74011636320 HC RX REV CODE- 636/320: Performed by: EMERGENCY MEDICINE

## 2019-01-24 PROCEDURE — 99283 EMERGENCY DEPT VISIT LOW MDM: CPT

## 2019-01-24 PROCEDURE — 80053 COMPREHEN METABOLIC PANEL: CPT

## 2019-01-24 PROCEDURE — 74177 CT ABD & PELVIS W/CONTRAST: CPT

## 2019-01-24 RX ORDER — LIDOCAINE 50 MG/G
PATCH TOPICAL
Qty: 30 EACH | Refills: 0 | Status: SHIPPED | OUTPATIENT
Start: 2019-01-24 | End: 2019-07-22

## 2019-01-24 RX ORDER — LIDOCAINE 50 MG/G
OINTMENT TOPICAL
Status: DISCONTINUED | OUTPATIENT
Start: 2019-01-24 | End: 2019-01-24

## 2019-01-24 RX ORDER — LIDOCAINE 4 G/100G
1 PATCH TOPICAL
Status: DISCONTINUED | OUTPATIENT
Start: 2019-01-24 | End: 2019-01-24

## 2019-01-24 RX ORDER — LIDOCAINE 4 G/100G
1 PATCH TOPICAL
Status: DISCONTINUED | OUTPATIENT
Start: 2019-01-24 | End: 2019-01-24 | Stop reason: HOSPADM

## 2019-01-24 RX ADMIN — IOPAMIDOL 80 ML: 612 INJECTION, SOLUTION INTRAVENOUS at 13:29

## 2019-01-24 NOTE — ED TRIAGE NOTES
Patient states lifting heavy object with right arm prior to onset of right lateral neck pain one week ago. She states pain will radiate from right lateral neck down right arm. C/o pain radiating to right hip as well.

## 2019-01-24 NOTE — ED PROVIDER NOTES
EMERGENCY DEPARTMENT HISTORY AND PHYSICAL EXAM 
 
Date: 1/24/2019 Patient Name: Bhavna Fernandez History of Presenting Illness Chief Complaint Patient presents with  Neck Pain History Provided By: Patient, Patient's Sister and niece Chief Complaint: neck pain Duration: 4 Days Timing:  Acute Location: right neck Quality: Aching and Dull Severity: Moderate Modifying Factors: hurts to turn neck Associated Symptoms: abd pain, dark urine, lower back pain, occasional constipation Additional History (Context): Bhavna Fernandez is a 80 y.o. female with diabetes, hypertension, hyperlipidemia and GERD who presents with right neck pain, right sided abd pain, occasional constipation, and lower back pain for past few days. On miralax. Denies injury, weakness, numbness. Symptoms intermittent. PCP: Silvano Schaffer MD 
 
Current Facility-Administered Medications Medication Dose Route Frequency Provider Last Rate Last Dose  lidocaine 4 % patch 1 Patch  1 Patch TransDERmal NOW Mechelle Ivey PA   1 Patch at 01/24/19 2500 Current Outpatient Medications Medication Sig Dispense Refill  lidocaine (LIDODERM) 5 % Apply patch to the affected area for 12 hours a day and remove for 12 hours a day. 30 Each 0  
 potassium chloride (K-DUR, KLOR-CON) 20 mEq tablet Take 1 Tab by mouth daily. 90 Tab 3  polyethylene glycol (MIRALAX) 17 gram/dose powder Take 17 g by mouth daily. 1 tablespoon with 8 oz of water daily 238 g 0  
 metFORMIN (GLUCOPHAGE) 500 mg tablet TAKE 1 TABLET BY MOUTH TWICE A DAY WITH FOOD 180 Tab 2  
 gemfibrozil (LOPID) 600 mg tablet Take 1 Tab by mouth daily. 90 Tab 3  
 lansoprazole (PREVACID) 30 mg capsule TAKE 1 CAPSULE DAILY       (BEFORE BREAKFAST) 90 Cap 3  
 mirabegron ER (MYRBETRIQ) 25 mg ER tablet Take 1 Tab by mouth daily. 90 Tab 3  
 metoprolol succinate (TOPROL-XL) 25 mg XL tablet TAKE 1 TABLET BY MOUTH EVERY DAY 30 Tab 6  acetaminophen (TYLENOL EXTRA STRENGTH) 500 mg tablet Take  by mouth every six (6) hours as needed for Pain.  chlorthalidone (HYGROTEN) 25 mg tablet Take 1 Tab by mouth daily. 90 Tab 3  
 digoxin (LANOXIN) 0.125 mg tablet TAKE 1 TABLET DAILY 90 Tab 3  
 lisinopril (PRINIVIL, ZESTRIL) 20 mg tablet Take 1 Tab by mouth daily. 90 Tab 3  
 terazosin (HYTRIN) 5 mg capsule Take 1 Cap by mouth nightly. 90 Cap 3  
 aspirin 81 mg tablet Take 1 Tab by mouth daily. 1 Tab 0  
 glucose blood VI test strips (ACCU-CHEK COMPACT TEST) strip by Does Not Apply route. Use as directed 1 Package 11 Past History Past Medical History: 
Past Medical History:  
Diagnosis Date  Cardiac echocardiogram 2008 EF 60-65%. No RWMA. RVSP 35 mmHg. Mild AI. Mild MR. Mild TR. Mild PI.  Cardiac nuclear imaging test 2004 A-fib. No evidence of ischemia or prior infarction. EF 59%. No WMA.  Cardiovascular lower extremity venous duplex 10/05/2011 No deep vein or superficial thrombosis bilaterally. Bilateral calf veins w/calcific changes.  Diabetes ([address] 2006  Essential hypertension, benign  GERD (gastroesophageal reflux disease)  Hypercholesterolemia  Hypertension  Long term (current) use of anticoagulants 3/14/2011  Paroxysmal atrial fibrillation (HCC)  S/P total hysterectomy late  Past Surgical History: 
Past Surgical History:  
Procedure Laterality Date  HX CATARACT REMOVAL    
 bilateral  
 HX HYSTERECTOMY  late  Family History: 
Family History Problem Relation Age of Onset  Hypertension Mother  Heart Failure Father  at age 80 from CHF  Diabetes Sister  Diabetes Brother Social History: 
Social History Tobacco Use  Smoking status: Never Smoker  Smokeless tobacco: Never Used Substance Use Topics  Alcohol use: No  
 Drug use: No  
 
 
Allergies: 
No Known Allergies Review of Systems Review of Systems Constitutional: Negative for fever. Respiratory: Negative for shortness of breath. Cardiovascular: Negative for chest pain. Gastrointestinal: Positive for abdominal pain. Musculoskeletal: Positive for back pain and neck pain. All other systems reviewed and are negative. All Other Systems Negative Physical Exam  
 
Vitals:  
 01/24/19 1202 01/24/19 1522 BP: 135/75 144/78 Pulse: (!) 59 61 Resp: 18 18 Temp: 98.8 °F (37.1 °C) SpO2: 100% 100% Weight: 59 kg (130 lb) Height: 5' 3\" (1.6 m) Physical Exam  
Constitutional: She is oriented to person, place, and time. She appears well-developed and well-nourished. No distress. HENT:  
Head: Normocephalic and atraumatic. Eyes: Pupils are equal, round, and reactive to light. Neck: No JVD present. No tracheal deviation present. No thyromegaly present. Inferior cervical spine midline TTP as well as right posterior SCM distribution TTP. Cardiovascular: Normal rate, regular rhythm, normal heart sounds and intact distal pulses. Exam reveals no gallop and no friction rub. No murmur heard. Equal radial pulses. Pulmonary/Chest: Effort normal and breath sounds normal. No stridor. No respiratory distress. She has no wheezes. She has no rales. She exhibits no tenderness. Abdominal: Soft. She exhibits no distension and no mass. There is tenderness. There is no rebound and no guarding. Mild right latera mid-abd TTP. No rebound or guarding. Musculoskeletal: She exhibits no edema or tenderness. Lymphadenopathy:  
  She has no cervical adenopathy. Neurological: She is alert and oriented to person, place, and time. Skin: Skin is warm and dry. No rash noted. She is not diaphoretic. No erythema. No pallor. Psychiatric: She has a normal mood and affect. Her behavior is normal. Thought content normal.  
Nursing note and vitals reviewed. Diagnostic Study Results Labs - 
  
 Recent Results (from the past 12 hour(s)) CBC WITH AUTOMATED DIFF Collection Time: 01/24/19 12:45 PM  
Result Value Ref Range WBC 6.3 4.6 - 13.2 K/uL  
 RBC 3.91 (L) 4.20 - 5.30 M/uL  
 HGB 10.3 (L) 12.0 - 16.0 g/dL HCT 33.7 (L) 35.0 - 45.0 % MCV 86.2 74.0 - 97.0 FL  
 MCH 26.3 24.0 - 34.0 PG  
 MCHC 30.6 (L) 31.0 - 37.0 g/dL  
 RDW 14.9 (H) 11.6 - 14.5 % PLATELET 617 501 - 211 K/uL MPV 9.7 9.2 - 11.8 FL  
 NEUTROPHILS 65 40 - 73 % LYMPHOCYTES 23 21 - 52 % MONOCYTES 10 3 - 10 % EOSINOPHILS 2 0 - 5 % BASOPHILS 0 0 - 2 %  
 ABS. NEUTROPHILS 4.1 1.8 - 8.0 K/UL  
 ABS. LYMPHOCYTES 1.4 0.9 - 3.6 K/UL  
 ABS. MONOCYTES 0.6 0.05 - 1.2 K/UL  
 ABS. EOSINOPHILS 0.1 0.0 - 0.4 K/UL  
 ABS. BASOPHILS 0.0 0.0 - 0.1 K/UL  
 DF AUTOMATED METABOLIC PANEL, COMPREHENSIVE Collection Time: 01/24/19 12:45 PM  
Result Value Ref Range Sodium 142 136 - 145 mmol/L Potassium 4.3 3.5 - 5.5 mmol/L Chloride 105 100 - 108 mmol/L  
 CO2 25 21 - 32 mmol/L Anion gap 12 3.0 - 18 mmol/L Glucose 93 74 - 99 mg/dL BUN 18 7.0 - 18 MG/DL Creatinine 0.98 0.6 - 1.3 MG/DL  
 BUN/Creatinine ratio 18 12 - 20 GFR est AA >60 >60 ml/min/1.73m2 GFR est non-AA 52 (L) >60 ml/min/1.73m2 Calcium 9.7 8.5 - 10.1 MG/DL Bilirubin, total 0.3 0.2 - 1.0 MG/DL  
 ALT (SGPT) 8 (L) 13 - 56 U/L  
 AST (SGOT) 10 (L) 15 - 37 U/L Alk. phosphatase 74 45 - 117 U/L Protein, total 6.9 6.4 - 8.2 g/dL Albumin 3.2 (L) 3.4 - 5.0 g/dL Globulin 3.7 2.0 - 4.0 g/dL A-G Ratio 0.9 0.8 - 1.7 LIPASE Collection Time: 01/24/19 12:45 PM  
Result Value Ref Range Lipase 123 73 - 393 U/L  
URINALYSIS W/ RFLX MICROSCOPIC Collection Time: 01/24/19  2:50 PM  
Result Value Ref Range Color YELLOW Appearance CLOUDY Specific gravity >1.030 (H) 1.005 - 1.030  
 pH (UA) 5.0 5.0 - 8.0 Protein NEGATIVE  NEG mg/dL Glucose NEGATIVE  NEG mg/dL Ketone NEGATIVE  NEG mg/dL Bilirubin NEGATIVE  NEG Blood NEGATIVE  NEG Urobilinogen 1.0 0.2 - 1.0 EU/dL Nitrites NEGATIVE  NEG Leukocyte Esterase NEGATIVE  NEG Radiologic Studies -  
CT ABD PELV W CONT Final Result IMPRESSION:   
  
1. No acute diagnostic finding noted to explain patient's symptom. 2. Splenic cystic lesion appears stable, probably representing a hemangioma or  
lymphangioma. 3. Prominent proximal gastric wall, probably due to suboptimal distention. Upper GI study can be correlated if indicated. 4. Diverticulosis coli. Focal mural thickening at rectosigmoid junction,  
probably due to peristalsis and poor distention. Recommend colonoscopy  
correlation to exclude potential mass lesion. 5. Moderate spondylosis of the lumbar spine. Thank you for this referral.   
  
CT SPINE CERV WO CONT Final Result IMPRESSION:  
  
No CT evidence of acute C-spine injury seen. Moderate spondylosis at multiple levels of C-spine as above. Thank you for your referral. Delayed CT Results  (Last 48 hours) 01/24/19 1404  CT ABD PELV W CONT Final result Impression:  IMPRESSION:   
   
1. No acute diagnostic finding noted to explain patient's symptom. 2. Splenic cystic lesion appears stable, probably representing a hemangioma or  
lymphangioma. 3. Prominent proximal gastric wall, probably due to suboptimal distention. Upper GI study can be correlated if indicated. 4. Diverticulosis coli. Focal mural thickening at rectosigmoid junction,  
probably due to peristalsis and poor distention. Recommend colonoscopy  
correlation to exclude potential mass lesion. 5. Moderate spondylosis of the lumbar spine. Thank you for this referral.   
  
 Narrative:  CT of abdomen and pelvis with contrast   
   
INDICATION: Heavy lifting and then left lateral neck pain radiating to the right  
arm and right hip COMPARISON: 11/4/18. TECHNIQUE: 5 mm helical scan to the abdomen and pelvis is obtained  from the  
diaphragm to the symphysis pubis after uneventful nonionic IV contrast  
administration. All CT scans at this facility performed using dose optimization techniques as  
appreciated to a performed exam, to include automated exposure control,  
adjustment of the mA and or KU according to patient size (including appropriate  
matching for site specific examination), or use of iterative reconstruction  
technique. CONTRAST: 100 cc Isovue 370. FINDINGS:   
   
Lung Bases: Chronic right basilar atelectasis present. Mild cardiomegaly and  
ectatic and tortuous distal descending aorta. Liver: Mild hepatic steatosis. Gallbladder: Normal.   
   
Biliary System: No ductal dilatation. Spleen: Cystic lesion with mild nodular wall again noted at the lateral aspect  
of the spleen, measuring 2.2 x 3.1 cm, grossly unchanged. Pancreas: Normal.  
   
Kidneys: Unremarkable. Adrenal Glands: Normal.  
   
Bowel: The stomach is suboptimally distended. Prominent gastric wall in the  
fundus noted as nonspecific finding. The small and large bowel are nondilated. Mild fecal impaction. Scattered diverticula in the distal colon without acute  
finding. The appendix is not clearly seen. There is a focal mural thickening at  
rectosigmoid junction, probably due to peristalsis and acute turn. Potential  
mass lesion cannot be entirely excluded. Genital structures: The bladder is moderately distended. Tiny diverticulum in  
right anterior aspect. The prostate is not clearly seen, probably postop  
changes. Peritoneum/Retroperitoneum: No adenopathy. No free air or fluid. Vasculature: Moderate aortic atherosclerotic disease. [place] Peralta Other:  None. CT OSSEOUS STRUCTURES:     
   
Moderate spondylosis in lower lumbar spine.  Minimal anterolisthesis of L4 on L5  
and retrolisthesis of L5 on S1.  
   
  
 01/24/19 1404  CT SPINE CERV WO CONT Final result Impression:  IMPRESSION:  
   
No CT evidence of acute C-spine injury seen. Moderate spondylosis at multiple levels of C-spine as above. Thank you for your referral. Delayed Narrative:  CT cervical spine without contrast   
   
CPT CODE: 91523 HISTORY: Trauma. COMPARISON: None. TECHNIQUE: Helical axial images to the cervical spine are obtained without  
intrathecal contrast. Sagittal and coronal reconstructions were obtained to  
better evaluate alignment, disc space heights, interfacet relations and the  
vertebral integrity. All CT scans at this facility performed using dose optimization techniques as  
appreciated to a performed exam, to include automated exposure control,  
adjustment of the mA and or KU according to patient size (including appropriate  
matching for site specific examination), or use of iterative reconstruction  
technique. FINDINGS: The vertebral column of the cervical spine are within normal limits. No acute compression fracture seen. Minimal retrolisthesis of L3 on L4 noted  
with associated moderate osteophytic changes as likely chronic finding. The  
odontoid and C1-2 relationship appear within normal.  Multilevel mild disc space  
narrowing at the posterior aspect of L spine. Calcified disc at C6-7 along with  
uncovertebral spurring resulting in moderate to severe bilateral foraminal  
stenosis. Moderate bilateral neural foraminal stenosis at C4-5 and C5-6. The  
prevertebral soft tissue space appears unremarkable. CXR Results  (Last 48 hours) None Medical Decision Making I am the first provider for this patient. I reviewed the vital signs, available nursing notes, past medical history, past surgical history, family history and social history. Vital Signs-Reviewed the patient's vital signs. Records Reviewed: Nursing Notes Procedures: Procedures Provider Notes (Medical Decision Making): nothing acute on scans, labs. Treat her reproducible musculoskeletal pain w/topical lidoderm. MED RECONCILIATION: 
Current Facility-Administered Medications Medication Dose Route Frequency  lidocaine 4 % patch 1 Patch  1 Patch TransDERmal NOW Current Outpatient Medications Medication Sig  
 lidocaine (LIDODERM) 5 % Apply patch to the affected area for 12 hours a day and remove for 12 hours a day.  potassium chloride (K-DUR, KLOR-CON) 20 mEq tablet Take 1 Tab by mouth daily.  polyethylene glycol (MIRALAX) 17 gram/dose powder Take 17 g by mouth daily. 1 tablespoon with 8 oz of water daily  metFORMIN (GLUCOPHAGE) 500 mg tablet TAKE 1 TABLET BY MOUTH TWICE A DAY WITH FOOD  gemfibrozil (LOPID) 600 mg tablet Take 1 Tab by mouth daily.  lansoprazole (PREVACID) 30 mg capsule TAKE 1 CAPSULE DAILY       (BEFORE BREAKFAST)  mirabegron ER (MYRBETRIQ) 25 mg ER tablet Take 1 Tab by mouth daily.  metoprolol succinate (TOPROL-XL) 25 mg XL tablet TAKE 1 TABLET BY MOUTH EVERY DAY  acetaminophen (TYLENOL EXTRA STRENGTH) 500 mg tablet Take  by mouth every six (6) hours as needed for Pain.  chlorthalidone (HYGROTEN) 25 mg tablet Take 1 Tab by mouth daily.  digoxin (LANOXIN) 0.125 mg tablet TAKE 1 TABLET DAILY  lisinopril (PRINIVIL, ZESTRIL) 20 mg tablet Take 1 Tab by mouth daily.  terazosin (HYTRIN) 5 mg capsule Take 1 Cap by mouth nightly.  aspirin 81 mg tablet Take 1 Tab by mouth daily.  glucose blood VI test strips (ACCU-CHEK COMPACT TEST) strip by Does Not Apply route. Use as directed Disposition: 
home DISCHARGE NOTE:  
5:45 PM 
 
Pt has been reexamined. Patient has no new complaints, changes, or physical findings. Care plan outlined and precautions discussed. Results of labs, imaging were reviewed with the patient. All medications were reviewed with the patient; will d/c home with lidoderm.  All of pt's questions and concerns were addressed. Patient was instructed and agrees to follow up with PCP, as well as to return to the ED upon further deterioration. Patient is ready to go home. Follow-up Information Follow up With Specialties Details Why Contact Info Silvano Schaffer MD Family Practice Schedule an appointment as soon as possible for a visit in 1 day  [address] [facility] [phone] [address] 
[phone] [address] [facility] EMERGENCY DEPT Emergency Medicine  If symptoms worsen return immediately Bill Torrez [id number] [phone] Current Discharge Medication List  
  
START taking these medications Details  
lidocaine (LIDODERM) 5 % Apply patch to the affected area for 12 hours a day and remove for 12 hours a day. Qty: 30 Each, Refills: 0 Diagnosis Clinical Impression: 1. Upper back pain on right side 2. Neck strain, initial encounter

## 2019-01-24 NOTE — DISCHARGE INSTRUCTIONS
Patient Education        Neck Strain or Sprain: Rehab Exercises  Your Care Instructions  Here are some examples of typical rehabilitation exercises for your condition. Start each exercise slowly. Ease off the exercise if you start to have pain. Your doctor or physical therapist will tell you when you can start these exercises and which ones will work best for you. How to do the exercises  Neck rotation    1. Sit in a firm chair, or stand up straight. 2. Keeping your chin level, turn your head to the right, and hold for 15 to 30 seconds. 3. Turn your head to the left and hold for 15 to 30 seconds. 4. Repeat 2 to 4 times to each side. Neck stretches    1. Look straight ahead, and tip your right ear to your right shoulder. Do not let your left shoulder rise up as you tip your head to the right. 2. Hold for 15 to 30 seconds. 3. Tilt your head to the left. Do not let your right shoulder rise up as you tip your head to the left. 4. Hold for 15 to 30 seconds. 5. Repeat 2 to 4 times to each side. Forward neck flexion    1. Sit in a firm chair, or stand up straight. 2. Bend your head forward. 3. Hold for 15 to 30 seconds. 4. Repeat 2 to 4 times. Lateral (side) bend strengthening    1. With your right hand, place your first two fingers on your right temple. 2. Start to bend your head to the side while using gentle pressure from your fingers to keep your head from bending. 3. Hold for about 6 seconds. 4. Repeat 8 to 12 times. 5. Switch hands and repeat the same exercise on your left side. Forward bend strengthening    1. Place your first two fingers of either hand on your forehead. 2. Start to bend your head forward while using gentle pressure from your fingers to keep your head from bending. 3. Hold for about 6 seconds. 4. Repeat 8 to 12 times. Neutral position strengthening    1. Using one hand, place your fingertips on the back of your head at the top of your neck.   2. Start to bend your head backward while using gentle pressure from your fingers to keep your head from bending. 3. Hold for about 6 seconds. 4. Repeat 8 to 12 times. Chin tuck    1. Lie on the floor with a rolled-up towel under your neck. Your head should be touching the floor. 2. Slowly bring your chin toward your chest.  3. Hold for a count of 6, and then relax for up to 10 seconds. 4. Repeat 8 to 12 times. Follow-up care is a key part of your treatment and safety. Be sure to make and go to all appointments, and call your doctor if you are having problems. It's also a good idea to know your test results and keep a list of the medicines you take. Where can you learn more? Go to http://juju-karol.info/. Enter M679 in the search box to learn more about \"Neck Strain or Sprain: Rehab Exercises. \"  Current as of: September 20, 2018  Content Version: 11.9  © 9189-2439 [employer]. Care instructions adapted under license by [employer] (which disclaims liability or warranty for this information). If you have questions about a medical condition or this instruction, always ask your healthcare professional. [place] [address] any warranty or liability for your use of this information. Patient Education        Neck Strain: Care Instructions  Your Care Instructions    You have strained the muscles and ligaments in your neck. A sudden, awkward movement can strain the neck. This often occurs with falls or car accidents or during certain sports. Everyday activities like working on a computer or sleeping can also cause neck strain if they force you to hold your neck in an awkward position for a long time. It is common for neck pain to get worse for a day or two after an injury, but it should start to feel better after that. You may have more pain and stiffness for several days before it gets better. This is expected.  It may take a few weeks or longer for it to heal completely. Good home treatment can help you get better faster and avoid future neck problems. Follow-up care is a key part of your treatment and safety. Be sure to make and go to all appointments, and call your doctor if you are having problems. It's also a good idea to know your test results and keep a list of the medicines you take. How can you care for yourself at home? · If you were given a neck brace (cervical collar) to limit neck motion, wear it as instructed for as many days as your doctor tells you to. Do not wear it longer than you were told to. Wearing a brace for too long can make neck stiffness worse and weaken the neck muscles. · You can try using heat or ice to see if it helps. ? Try using a heating pad on a low or medium setting for 15 to 20 minutes every 2 to 3 hours. Try a warm shower in place of one session with the heating pad. You can also buy single-use heat wraps that last up to 8 hours. ? You can also try an ice pack for 10 to 15 minutes every 2 to 3 hours. · Take pain medicines exactly as directed. ? If the doctor gave you a prescription medicine for pain, take it as prescribed. ? If you are not taking a prescription pain medicine, ask your doctor if you can take an over-the-counter medicine. · Gently rub the area to relieve pain and help with blood flow. Do not massage the area if it hurts to do so. · Do not do anything that makes the pain worse. Take it easy for a couple of days. You can do your usual activities if they do not hurt your neck or put it at risk for more stress or injury. · Try sleeping on a special neck pillow. Place it under your neck, not under your head. Placing a tightly rolled-up towel under your neck while you sleep will also work. If you use a neck pillow or rolled towel, do not use your regular pillow at the same time. · To prevent future neck pain, do exercises to stretch and strengthen your neck and back.  Learn how to use good posture, safe lifting techniques, and proper body mechanics. When should you call for help? Call 911 anytime you think you may need emergency care. For example, call if:    · You are unable to move an arm or a leg at all.   [facility] your doctor now or seek immediate medical care if:    · You have new or worse symptoms in your arms, legs, chest, belly, or buttocks. Symptoms may include:  ? Numbness or tingling. ? Weakness. ? Pain.     · You lose bladder or bowel control.    Watch closely for changes in your health, and be sure to contact your doctor if:    · You are not getting better as expected. Where can you learn more? Go to http://juju-karol.info/. Enter M253 in the search box to learn more about \"Neck Strain: Care Instructions. \"  Current as of: September 20, 2018  Content Version: 11.9  © 5382-6541 [employer]. Care instructions adapted under license by [employer] (which disclaims liability or warranty for this information). If you have questions about a medical condition or this instruction, always ask your healthcare professional. [address] any warranty or liability for your use of this information.

## 2019-01-25 ENCOUNTER — TELEPHONE (OUTPATIENT)
Dept: FAMILY MEDICINE CLINIC | Age: 84
End: 2019-01-25

## 2019-01-25 NOTE — TELEPHONE ENCOUNTER
Pt's niece Glen Sender is calling to speak with the nurse, she needs the doctor to call the insurance so that the insurance will cover the patches. Please advise.   [phone]

## 2019-01-25 NOTE — TELEPHONE ENCOUNTER
Sajan Whelan calling patient was seen in ER yesterday for neck pain they put patch on it but patient is still having pain. Patient was given lidocaine patch. Wanted to know if she could put the patch up higher then her waist maybe on shoulder to help with the pain.     pls advise

## 2019-01-26 NOTE — TELEPHONE ENCOUNTER
Has had neck and upper back pain since last Sunday. She has been using ibuprofen and tylenol. She went to the ED yesterday and was given a lidocaine patch. Sx are intermittent. Niece inquired if she was  able to take Ibuprofen and/or tylenol with the lidocaine patch as the sx have resumed this evening. Advised that pt is able to take the ibuprofen as directed on packaging and may also interchange tylenol as well. Take ibuprofen with food. Do not go over recommended amounts of Tylenol or Ibuprofen. Consider use of heating pad but do not fall asleep with heating pad on. Doris Chavez) voiced understanding.

## 2019-01-29 NOTE — TELEPHONE ENCOUNTER
Spoke with Micki Rubio, that stated all questions have been answered by Dr. Michela Guerrero, since Ms. Kristin Freire spoke to Dr. Michlea Guerrero while on call. Informed that if patient needs to make a follow up appointment for the emergency room visit. Informed to contact insurance company to confirm coverage for lidocaine patches. Ms. Kristin Freire verbalized understanding and is buying patches out of pocket with discount card.

## 2019-02-06 RX ORDER — POTASSIUM CHLORIDE 1500 MG/1
TABLET, EXTENDED RELEASE ORAL
Qty: 90 TAB | Refills: 3 | Status: SHIPPED | OUTPATIENT
Start: 2019-02-06 | End: 2020-01-27 | Stop reason: SDUPTHER

## 2019-03-07 ENCOUNTER — OFFICE VISIT (OUTPATIENT)
Dept: FAMILY MEDICINE CLINIC | Age: 84
End: 2019-03-07

## 2019-03-07 VITALS
RESPIRATION RATE: 18 BRPM | HEIGHT: 63 IN | TEMPERATURE: 98.7 F | BODY MASS INDEX: 22.39 KG/M2 | DIASTOLIC BLOOD PRESSURE: 78 MMHG | WEIGHT: 126.4 LBS | SYSTOLIC BLOOD PRESSURE: 146 MMHG | OXYGEN SATURATION: 98 % | HEART RATE: 67 BPM

## 2019-03-07 DIAGNOSIS — M54.2 NECK PAIN: ICD-10-CM

## 2019-03-07 DIAGNOSIS — R35.0 URINARY FREQUENCY: Primary | ICD-10-CM

## 2019-03-07 LAB
BILIRUB UR QL STRIP: NEGATIVE
GLUCOSE UR-MCNC: NEGATIVE MG/DL
KETONES P FAST UR STRIP-MCNC: NEGATIVE MG/DL
PH UR STRIP: 6.5 [PH] (ref 4.6–8)
PROT UR QL STRIP: NEGATIVE
SP GR UR STRIP: 1.02 (ref 1–1.03)
UA UROBILINOGEN AMB POC: NORMAL (ref 0.2–1)
URINALYSIS CLARITY POC: CLEAR
URINALYSIS COLOR POC: YELLOW
URINE BLOOD POC: NEGATIVE
URINE LEUKOCYTES POC: NEGATIVE
URINE NITRITES POC: NEGATIVE

## 2019-03-07 NOTE — PATIENT INSTRUCTIONS
Bladder Training: Care Instructions  Your Care Instructions    Bladder training is used to treat urge incontinence and stress incontinence. Urge incontinence means that the need to urinate comes on so fast that you can't get to a toilet in time. Stress incontinence means that you leak urine because of pressure on your bladder. For example, it may happen when you laugh, cough, or lift something heavy. Bladder training can increase how long you can wait before you have to urinate. It can also help your bladder hold more urine. And it can give you better control over the urge to urinate. It is important to remember that bladder training takes a few weeks to a few months to make a difference. You may not see results right away, but don't give up. Follow-up care is a key part of your treatment and safety. Be sure to make and go to all appointments, and call your doctor if you are having problems. It's also a good idea to know your test results and keep a list of the medicines you take. How can you care for yourself at home? Work with your doctor to come up with a bladder training program that is right for you. You may use one or more of the following methods. Delayed urination  · In the beginning, try to keep from urinating for 5 minutes after you first feel the need to go. · While you wait, take deep, slow breaths to relax. Kegel exercises can also help you delay the need to go to the bathroom. · After some practice, when you can easily wait 5 minutes to urinate, try to wait 10 minutes before you urinate. · Slowly increase the waiting period until you are able to control when you have to urinate. Scheduled urination  · Empty your bladder when you first wake up in the morning. · Schedule times throughout the day when you will urinate. · Start by going to the bathroom every hour, even if you don't need to go. · Slowly increase the time between trips to the bathroom.   · When you have found a schedule that works well for you, keep doing it. · If you wake up during the night and have to urinate, do it. Apply your schedule to waking hours only. Kegel exercises  These tighten and strengthen pelvic muscles, which can help you control the flow of urine. To do Kegel exercises:  · Squeeze the same muscles you would use to stop your urine. Your belly and thighs should not move. · Hold the squeeze for 3 seconds, and then relax for 3 seconds. · Start with 3 seconds. Then add 1 second each week until you are able to squeeze for 10 seconds. · Repeat the exercise 10 to 15 times a session. Do three or more sessions a day. When should you call for help? Watch closely for changes in your health, and be sure to contact your doctor if:    · Your incontinence is getting worse.     · You do not get better as expected. Where can you learn more? Go to http://juju-karol.info/. Enter D976 in the search box to learn more about \"Bladder Training: Care Instructions. \"  Current as of: March 20, 2018  Content Version: 11.9  © 6005-6910 [employer]. Care instructions adapted under license by [employer] (which disclaims liability or warranty for this information). If you have questions about a medical condition or this instruction, always ask your healthcare professional. [address] any warranty or liability for your use of this information.

## 2019-03-07 NOTE — PROGRESS NOTES
Patient here for frequent urination and lower back pain. 1. Have you been to the ER, urgent care clinic since your last visit? Hospitalized since your last visit? [facility] for neck pain. Lidocaine patch was prescribed. 2. Have you seen or consulted any other health care providers outside of the [address] [place] [address] since your last visit? Include any pap smears or colon screening.  Opthalmologist

## 2019-03-07 NOTE — PROGRESS NOTES
HISTORY OF PRESENT ILLNESS  Bhavna Fernandez is a 80 y.o. female. HPI  Patient is here today for evaluation and treatment of: Frequent Urination, Lower Back Pain:    Frequent Urination:  She has had urinary frequency. She has had some incontinence. No fever. No dysuria    Lower Back Pain:  She went to the ED in the [place] in January. States she had some difficulty standing. And had to bolster herself on her thighs to stand up. She also reports some neck pain since January       Current Outpatient Medications:     KLOR-CON M20 20 mEq tablet, TAKE 1 TABLET DAILY, Disp: 90 Tab, Rfl: 3    lidocaine (LIDODERM) 5 %, Apply patch to the affected area for 12 hours a day and remove for 12 hours a day., Disp: 30 Each, Rfl: 0    potassium chloride (K-DUR, KLOR-CON) 20 mEq tablet, Take 1 Tab by mouth daily. , Disp: 90 Tab, Rfl: 3    polyethylene glycol (MIRALAX) 17 gram/dose powder, Take 17 g by mouth daily. 1 tablespoon with 8 oz of water daily, Disp: 238 g, Rfl: 0    metFORMIN (GLUCOPHAGE) 500 mg tablet, TAKE 1 TABLET BY MOUTH TWICE A DAY WITH FOOD, Disp: 180 Tab, Rfl: 2    gemfibrozil (LOPID) 600 mg tablet, Take 1 Tab by mouth daily. , Disp: 90 Tab, Rfl: 3    lansoprazole (PREVACID) 30 mg capsule, TAKE 1 CAPSULE DAILY       (BEFORE BREAKFAST), Disp: 90 Cap, Rfl: 3    mirabegron ER (MYRBETRIQ) 25 mg ER tablet, Take 1 Tab by mouth daily. , Disp: 90 Tab, Rfl: 3    metoprolol succinate (TOPROL-XL) 25 mg XL tablet, TAKE 1 TABLET BY MOUTH EVERY DAY, Disp: 30 Tab, Rfl: 6    acetaminophen (TYLENOL EXTRA STRENGTH) 500 mg tablet, Take  by mouth every six (6) hours as needed for Pain., Disp: , Rfl:     chlorthalidone (HYGROTEN) 25 mg tablet, Take 1 Tab by mouth daily. , Disp: 90 Tab, Rfl: 3    digoxin (LANOXIN) 0.125 mg tablet, TAKE 1 TABLET DAILY, Disp: 90 Tab, Rfl: 3    lisinopril (PRINIVIL, ZESTRIL) 20 mg tablet, Take 1 Tab by mouth daily. , Disp: 90 Tab, Rfl: 3    terazosin (HYTRIN) 5 mg capsule, Take 1 Cap by mouth nightly., Disp: 90 Cap, Rfl: 3    aspirin 81 mg tablet, Take 1 Tab by mouth daily. , Disp: 1 Tab, Rfl: 0    glucose blood VI test strips (ACCU-CHEK COMPACT TEST) strip, by Does Not Apply route. Use as directed, Disp: 1 Package, Rfl: 11    PMH,  Meds, Allergies, Family History, Social history reviewed         Review of Systems   Constitutional: Negative for chills and fever. Respiratory: Negative for shortness of breath and wheezing. Cardiovascular: Negative for chest pain and palpitations. Gastrointestinal: Negative for nausea and vomiting. Physical Exam   Constitutional: She appears well-developed and well-nourished. No distress. Cardiovascular: Normal rate and regular rhythm. Exam reveals no gallop and no friction rub. No murmur heard. Pulmonary/Chest: Breath sounds normal. No respiratory distress. She has no wheezes. She has no rales. Abdominal: Bowel sounds are normal. She exhibits no distension and no mass. There is no tenderness. There is no rebound and no guarding. + ttp in neck musculature  Visit Vitals  /78 (BP 1 Location: Left arm, BP Patient Position: Sitting)   Pulse 67   Temp 98.7 °F (37.1 °C) (Oral)   Resp 18   Ht 5' 3\" (1.6 m)   Wt 126 lb 6.4 oz (57.3 kg)   SpO2 98%   BMI 22.39 kg/m²       ua noted  ASSESSMENT and PLAN    ICD-10-CM ICD-9-CM    1. Urinary frequency R35.0 788.41 AMB POC URINALYSIS DIP STICK AUTO W/O MICRO   2. Neck pain M54.2 723.1        As above, new   treatment plan as listed below  Orders Placed This Encounter    AMB POC URINALYSIS DIP STICK AUTO W/O MICRO     No evidence of UTI  Heat , massage to neck  Follow-up Disposition:  Return if symptoms worsen or fail to improve. An After Visit Summary was printed and given to the patient. This has been fully explained to the patient, who indicates understanding.

## 2019-06-07 RX ORDER — DIGOXIN 125 MCG
TABLET ORAL
Qty: 90 TAB | Refills: 3 | Status: SHIPPED | OUTPATIENT
Start: 2019-06-07 | End: 2020-04-15 | Stop reason: SDUPTHER

## 2019-07-22 ENCOUNTER — HOSPITAL ENCOUNTER (OUTPATIENT)
Dept: LAB | Age: 84
Discharge: HOME | End: 2019-07-22
Payer: MEDICARE

## 2019-07-22 ENCOUNTER — OFFICE VISIT (OUTPATIENT)
Dept: FAMILY MEDICINE CLINIC | Age: 84
End: 2019-07-22

## 2019-07-22 VITALS
WEIGHT: 127.2 LBS | OXYGEN SATURATION: 98 % | DIASTOLIC BLOOD PRESSURE: 66 MMHG | HEIGHT: 63 IN | RESPIRATION RATE: 18 BRPM | HEART RATE: 60 BPM | BODY MASS INDEX: 22.54 KG/M2 | SYSTOLIC BLOOD PRESSURE: 132 MMHG | TEMPERATURE: 98.2 F

## 2019-07-22 DIAGNOSIS — I10 ESSENTIAL HYPERTENSION, BENIGN: ICD-10-CM

## 2019-07-22 DIAGNOSIS — E78.00 HYPERCHOLESTEROLEMIA: ICD-10-CM

## 2019-07-22 DIAGNOSIS — E11.8 TYPE 2 DIABETES MELLITUS WITH COMPLICATION, WITHOUT LONG-TERM CURRENT USE OF INSULIN (HCC): ICD-10-CM

## 2019-07-22 DIAGNOSIS — E11.21 TYPE 2 DIABETES WITH NEPHROPATHY (HCC): Primary | ICD-10-CM

## 2019-07-22 DIAGNOSIS — E11.21 TYPE 2 DIABETES WITH NEPHROPATHY (HCC): ICD-10-CM

## 2019-07-22 PROCEDURE — 83036 HEMOGLOBIN GLYCOSYLATED A1C: CPT

## 2019-07-22 PROCEDURE — 80061 LIPID PANEL: CPT

## 2019-07-22 PROCEDURE — 84460 ALANINE AMINO (ALT) (SGPT): CPT

## 2019-07-22 PROCEDURE — 80048 BASIC METABOLIC PNL TOTAL CA: CPT

## 2019-07-22 PROCEDURE — 84450 TRANSFERASE (AST) (SGOT): CPT

## 2019-07-22 PROCEDURE — 36415 COLL VENOUS BLD VENIPUNCTURE: CPT

## 2019-07-22 PROCEDURE — 82043 UR ALBUMIN QUANTITATIVE: CPT

## 2019-07-22 RX ORDER — METFORMIN HYDROCHLORIDE 500 MG/1
TABLET ORAL
Qty: 180 TAB | Refills: 2 | Status: SHIPPED | OUTPATIENT
Start: 2019-07-22 | End: 2020-04-15 | Stop reason: SDUPTHER

## 2019-07-22 RX ORDER — LISINOPRIL 20 MG/1
20 TABLET ORAL DAILY
Qty: 90 TAB | Refills: 3 | Status: SHIPPED | OUTPATIENT
Start: 2019-07-22 | End: 2020-01-01 | Stop reason: SDUPTHER

## 2019-07-22 RX ORDER — LANSOPRAZOLE 30 MG/1
CAPSULE, DELAYED RELEASE ORAL
Qty: 90 CAP | Refills: 3 | Status: SHIPPED | OUTPATIENT
Start: 2019-07-22 | End: 2020-01-01 | Stop reason: SDUPTHER

## 2019-07-22 RX ORDER — CHLORTHALIDONE 25 MG/1
25 TABLET ORAL DAILY
Qty: 90 TAB | Refills: 3 | Status: SHIPPED | OUTPATIENT
Start: 2019-07-22 | End: 2019-08-06 | Stop reason: ALTCHOICE

## 2019-07-22 RX ORDER — TERAZOSIN 5 MG/1
5 CAPSULE ORAL
Qty: 90 CAP | Refills: 3 | Status: SHIPPED | OUTPATIENT
Start: 2019-07-22 | End: 2020-01-01 | Stop reason: SDUPTHER

## 2019-07-22 RX ORDER — METOPROLOL SUCCINATE 25 MG/1
TABLET, EXTENDED RELEASE ORAL
Qty: 90 TAB | Refills: 3 | Status: SHIPPED | OUTPATIENT
Start: 2019-07-22 | End: 2020-01-01 | Stop reason: SDUPTHER

## 2019-07-22 RX ORDER — GEMFIBROZIL 600 MG/1
600 TABLET, FILM COATED ORAL DAILY
Qty: 90 TAB | Refills: 3 | Status: SHIPPED | OUTPATIENT
Start: 2019-07-22 | End: 2020-01-01 | Stop reason: SDUPTHER

## 2019-07-22 NOTE — PROGRESS NOTES
Patient here for Diabetes/Diabetic Shoes Paperwork Completion:          1. Have you been to the ER, urgent care clinic since your last visit? Hospitalized since your last visit? No    2. Have you seen or consulted any other health care providers outside of the [address] [place] [address] since your last visit? Include any pap smears or colon screening.  No

## 2019-07-22 NOTE — PATIENT INSTRUCTIONS
Noninsulin Medicines for Type 2 Diabetes: Care Instructions  Your Care Instructions    There are different types of noninsulin medicines for diabetes. Each works in a different way. But they all help you control your blood sugar. Some types help your body make insulin to lower your blood sugar. Others lower how much insulin your body needs. Some can slow how fast your body digests sugars. And some can remove extra glucose through your urine. · Alpha-glucosidase inhibitors. These keep starches from breaking down. This means that they lower the amount of glucose absorbed when you eat. They don't help your body make more insulin. So they will not cause low blood sugar unless you use them with other medicines for diabetes. They include acarbose and miglitol. · DPP-4 inhibitors. These help your body raise the level of insulin after you eat. They also help your body make less of a hormone that raises blood sugar. They include linagliptin, saxagliptin, and sitagliptin. · Incretin hormones (GLP-1 receptor agonists). Your body makes a protein that can raise your insulin level. It also can lower your blood sugar and make you less hungry. You can get shots of hormones that work the same way. They include exenatide and liraglutide. · Meglitinides. These help your body release insulin. They also help slow how your body digests sugars. So they can keep your blood sugar from rising too fast after you eat. They include nateglinide and repaglinide. · Metformin. This lowers how much glucose your liver makes. And it helps you respond better to insulin. It also lowers the amount of stored sugar that your liver releases when you are not eating. · SGLT2 inhibitors. These help to remove extra glucose through your urine. They may also help some people lose weight. They include canagliflozin, dapagliflozin, and empagliflozin. · Sulfonylureas. These help your body release more insulin. Some work for many hours.  They can cause low blood sugar if you don't eat as you planned. They include glipizide and glyburide. · Thiazolidinediones. These reduce the amount of blood glucose. They also help you respond better to insulin. They include pioglitazone and rosiglitazone. You may need to take more than one medicine for diabetes. Two or more medicines may work better to lower your blood sugar level than just one does. Follow-up care is a key part of your treatment and safety. Be sure to make and go to all appointments, and call your doctor if you are having problems. It's also a good idea to know your test results and keep a list of the medicines you take. How can you care for yourself at home? · Eat a healthy diet. Get some exercise each day. This may help you to reduce how much medicine you need. · Do not take other prescription or over-the-counter medicines, vitamins, herbal products, or supplements without talking to your doctor first. Some medicines for type 2 diabetes can cause problems with other medicines or supplements. · Tell your doctor if you plan to get pregnant. Some of these drugs are not safe for pregnant women. · Be safe with medicines. Take your medicines exactly as prescribed. Meglitinides and sulfonylureas can cause your blood sugar to drop very low. Call your doctor if you think you are having a problem with your medicine. · Check your blood sugar often. You can use a glucose monitor. Keeping track can help you know how certain foods, activities, and medicines affect your blood sugar. And it can help you keep your blood sugar from getting so low that it's not safe. When should you call for help? Call 911 anytime you think you may need emergency care.  For example, call if:    · You passed out (lost consciousness).     · You are confused or cannot think clearly.     · Your blood sugar is very high or very low.    Watch closely for changes in your health, and be sure to contact your doctor if:    · Your blood sugar stays outside the level your doctor set for you.     · You have any problems. Where can you learn more? Go to http://juju-karol.info/. Enter H153 in the search box to learn more about \"Noninsulin Medicines for Type 2 Diabetes: Care Instructions. \"  Current as of: July 25, 2018  Content Version: 12.1  © 9971-3444 [employer]. Care instructions adapted under license by [employer] (which disclaims liability or warranty for this information). If you have questions about a medical condition or this instruction, always ask your healthcare professional. [address] any warranty or liability for your use of this information.

## 2019-07-22 NOTE — PROGRESS NOTES
HISTORY OF PRESENT ILLNESS  Nuzhat Beltran is a 80 y.o. female. HPI  Patient is here today for evaluation and treatment of: Diabetes/Diabetic Shoes Paperwork Completion:       Diabetes: checks her blood sugars at times. Blood sugars are stable. 120s- 140s. She is on meds as listed below  Lab Results   Component Value Date/Time    Hemoglobin A1c 5.9 (H) 07/22/2019 05:04 PM         Has felt SOB at times; occurs randomly; when she moves around too much she feels SOB. Settles down after  She rests. PT also has HTN and hypercholesterolemia; She is on meds as listed below; needs refills on several meds. She takes meds as prescribed. She tolerates med well. Current Outpatient Medications:     metoprolol succinate (TOPROL-XL) 25 mg XL tablet, TAKE 1 TABLET BY MOUTH EVERY DAY, Disp: 90 Tab, Rfl: 3    chlorthalidone (HYGROTEN) 25 mg tablet, Take 1 Tab by mouth daily. , Disp: 90 Tab, Rfl: 3    lisinopril (PRINIVIL, ZESTRIL) 20 mg tablet, Take 1 Tab by mouth daily. , Disp: 90 Tab, Rfl: 3    lansoprazole (PREVACID) 30 mg capsule, TAKE 1 CAPSULE DAILY       (BEFORE BREAKFAST), Disp: 90 Cap, Rfl: 3    terazosin (HYTRIN) 5 mg capsule, Take 1 Cap by mouth nightly., Disp: 90 Cap, Rfl: 3    gemfibrozil (LOPID) 600 mg tablet, Take 1 Tab by mouth daily. , Disp: 90 Tab, Rfl: 3    metFORMIN (GLUCOPHAGE) 500 mg tablet, TAKE 1 TABLET BY MOUTH TWICE A DAY WITH FOOD, Disp: 180 Tab, Rfl: 2    digoxin (LANOXIN) 0.125 mg tablet, TAKE 1 TABLET DAILY, Disp: 90 Tab, Rfl: 3    KLOR-CON M20 20 mEq tablet, TAKE 1 TABLET DAILY, Disp: 90 Tab, Rfl: 3    potassium chloride (K-DUR, KLOR-CON) 20 mEq tablet, Take 1 Tab by mouth daily. , Disp: 90 Tab, Rfl: 3    polyethylene glycol (MIRALAX) 17 gram/dose powder, Take 17 g by mouth daily. 1 tablespoon with 8 oz of water daily, Disp: 238 g, Rfl: 0    mirabegron ER (MYRBETRIQ) 25 mg ER tablet, Take 1 Tab by mouth daily. , Disp: 90 Tab, Rfl: 3    acetaminophen (TYLENOL EXTRA STRENGTH) 500 mg tablet, Take  by mouth every six (6) hours as needed for Pain., Disp: , Rfl:     aspirin 81 mg tablet, Take 1 Tab by mouth daily. , Disp: 1 Tab, Rfl: 0    glucose blood VI test strips (ACCU-CHEK COMPACT TEST) strip, by Does Not Apply route. Use as directed, Disp: 1 Package, Rfl: 11    PMH,  Meds, Allergies, Family History, Social history reviewed    Review of Systems   Constitutional: Negative for chills and fever. Cardiovascular: Negative for chest pain and palpitations. Physical Exam   Constitutional: She appears well-developed and well-nourished. No distress. Cardiovascular: Normal rate and regular rhythm. Exam reveals no gallop and no friction rub. No murmur heard. Pulmonary/Chest: Breath sounds normal. No respiratory distress. She has no wheezes. She has no rales. Musculoskeletal: She exhibits no edema. Sensory exam of the foot is normal, tested with the monofilament. Good pulses, no lesions or ulcers, good peripheral pulses. Visit Vitals  /66 (BP 1 Location: Left arm, BP Patient Position: Sitting)   Pulse 60   Temp 98.2 °F (36.8 °C) (Oral)   Resp 18   Ht 5' 3\" (1.6 m)   Wt 127 lb 3.2 oz (57.7 kg)   SpO2 98%   BMI 22.53 kg/m²         ASSESSMENT and PLAN    ICD-10-CM ICD-9-CM    1. Type 2 diabetes with nephropathy (HCC) E11.21 250.40  DIABETES FOOT EXAM     583.81 HEMOGLOBIN A1C WITH EAG      MICROALBUMIN, UR, RAND W/ MICROALB/CREAT RATIO   2. Hypercholesterolemia E78.00 272.0 LIPID PANEL      AST      ALT   3.  Essential hypertension, benign V07 286.9 METABOLIC PANEL, BASIC       As above,   above all stable unless otherwise noted   treatment plan as listed below  Orders Placed This Encounter    LIPID PANEL    METABOLIC PANEL, BASIC    HEMOGLOBIN A1C WITH EAG    AST    ALT    MICROALBUMIN, UR, RAND W/ MICROALB/CREAT RATIO     DIABETES FOOT EXAM    metoprolol succinate (TOPROL-XL) 25 mg XL tablet    chlorthalidone (HYGROTEN) 25 mg tablet    lisinopril (PRINIVIL, ZESTRIL) 20 mg tablet    lansoprazole (PREVACID) 30 mg capsule    terazosin (HYTRIN) 5 mg capsule    gemfibrozil (LOPID) 600 mg tablet    metFORMIN (GLUCOPHAGE) 500 mg tablet     Refilled med needed  Filled out forms for diabetic shoes  Follow-up and Dispositions    · Return in about 4 months (around 11/22/2019) for medicare well. An After Visit Summary was printed and given to the patient. This has been fully explained to the patient, who indicates understanding.   Monitor SOB; may be due to deconditioning; TO ED for worsening SOB, CP

## 2019-07-23 LAB
ALT SERPL-CCNC: 10 U/L (ref 13–56)
ANION GAP SERPL CALC-SCNC: 6 MMOL/L (ref 3–18)
AST SERPL-CCNC: 10 U/L (ref 10–38)
BUN SERPL-MCNC: 22 MG/DL (ref 7–18)
BUN/CREAT SERPL: 22 (ref 12–20)
CALCIUM SERPL-MCNC: 9.4 MG/DL (ref 8.5–10.1)
CHLORIDE SERPL-SCNC: 108 MMOL/L (ref 100–111)
CHOLEST SERPL-MCNC: 177 MG/DL
CO2 SERPL-SCNC: 28 MMOL/L (ref 21–32)
CREAT SERPL-MCNC: 1.02 MG/DL (ref 0.6–1.3)
GLUCOSE SERPL-MCNC: 70 MG/DL (ref 74–99)
HDLC SERPL-MCNC: 69 MG/DL (ref 40–60)
HDLC SERPL: 2.6 {RATIO} (ref 0–5)
LDLC SERPL CALC-MCNC: 95.2 MG/DL (ref 0–100)
LIPID PROFILE,FLP: ABNORMAL
POTASSIUM SERPL-SCNC: 4.2 MMOL/L (ref 3.5–5.5)
SODIUM SERPL-SCNC: 142 MMOL/L (ref 136–145)
TRIGL SERPL-MCNC: 64 MG/DL (ref ?–150)
VLDLC SERPL CALC-MCNC: 12.8 MG/DL

## 2019-07-24 LAB
CREAT UR-MCNC: 75 MG/DL (ref 30–125)
EST. AVERAGE GLUCOSE BLD GHB EST-MCNC: 123 MG/DL
HBA1C MFR BLD: 5.9 % (ref 4.2–5.6)
MICROALBUMIN UR-MCNC: 0.54 MG/DL (ref 0–3)
MICROALBUMIN/CREAT UR-RTO: 7 MG/G (ref 0–30)

## 2019-08-03 ENCOUNTER — HOSPITAL ENCOUNTER (EMERGENCY)
Age: 84
Discharge: HOME | End: 2019-08-03
Attending: EMERGENCY MEDICINE
Payer: MEDICARE

## 2019-08-03 VITALS
RESPIRATION RATE: 20 BRPM | HEIGHT: 63 IN | SYSTOLIC BLOOD PRESSURE: 107 MMHG | DIASTOLIC BLOOD PRESSURE: 56 MMHG | TEMPERATURE: 99.2 F | WEIGHT: 130 LBS | HEART RATE: 57 BPM | OXYGEN SATURATION: 98 % | BODY MASS INDEX: 23.04 KG/M2

## 2019-08-03 DIAGNOSIS — N30.01 ACUTE CYSTITIS WITH HEMATURIA: Primary | ICD-10-CM

## 2019-08-03 DIAGNOSIS — E86.0 DEHYDRATION: ICD-10-CM

## 2019-08-03 DIAGNOSIS — R32 URINARY INCONTINENCE, UNSPECIFIED TYPE: ICD-10-CM

## 2019-08-03 LAB
ANION GAP SERPL CALC-SCNC: 6 MMOL/L (ref 3–18)
APPEARANCE UR: ABNORMAL
BACTERIA URNS QL MICRO: ABNORMAL /HPF
BASOPHILS # BLD: 0 K/UL (ref 0–0.1)
BASOPHILS NFR BLD: 0 % (ref 0–2)
BILIRUB UR QL: NEGATIVE
BUN SERPL-MCNC: 25 MG/DL (ref 7–18)
BUN/CREAT SERPL: 22 (ref 12–20)
CALCIUM SERPL-MCNC: 9.7 MG/DL (ref 8.5–10.1)
CHLORIDE SERPL-SCNC: 108 MMOL/L (ref 100–111)
CO2 SERPL-SCNC: 29 MMOL/L (ref 21–32)
COLOR UR: YELLOW
CREAT SERPL-MCNC: 1.16 MG/DL (ref 0.6–1.3)
DIFFERENTIAL METHOD BLD: ABNORMAL
EOSINOPHIL # BLD: 0.2 K/UL (ref 0–0.4)
EOSINOPHIL NFR BLD: 2 % (ref 0–5)
EPITH CASTS URNS QL MICRO: ABNORMAL /LPF (ref 0–5)
ERYTHROCYTE [DISTWIDTH] IN BLOOD BY AUTOMATED COUNT: 14.9 % (ref 11.6–14.5)
GLUCOSE SERPL-MCNC: 144 MG/DL (ref 74–99)
GLUCOSE UR STRIP.AUTO-MCNC: NEGATIVE MG/DL
HCT VFR BLD AUTO: 36.7 % (ref 35–45)
HGB BLD-MCNC: 11.3 G/DL (ref 12–16)
HGB UR QL STRIP: ABNORMAL
KETONES UR QL STRIP.AUTO: NEGATIVE MG/DL
LACTATE BLD-SCNC: 1.24 MMOL/L (ref 0.4–2)
LEUKOCYTE ESTERASE UR QL STRIP.AUTO: ABNORMAL
LYMPHOCYTES # BLD: 1.6 K/UL (ref 0.9–3.6)
LYMPHOCYTES NFR BLD: 18 % (ref 21–52)
MCH RBC QN AUTO: 26.2 PG (ref 24–34)
MCHC RBC AUTO-ENTMCNC: 30.8 G/DL (ref 31–37)
MCV RBC AUTO: 85 FL (ref 74–97)
MONOCYTES # BLD: 0.6 K/UL (ref 0.05–1.2)
MONOCYTES NFR BLD: 7 % (ref 3–10)
NEUTS SEG # BLD: 6.3 K/UL (ref 1.8–8)
NEUTS SEG NFR BLD: 73 % (ref 40–73)
NITRITE UR QL STRIP.AUTO: NEGATIVE
PH UR STRIP: 6 [PH] (ref 5–8)
PLATELET # BLD AUTO: 327 K/UL (ref 135–420)
PMV BLD AUTO: 9.7 FL (ref 9.2–11.8)
POTASSIUM SERPL-SCNC: 4 MMOL/L (ref 3.5–5.5)
PROT UR STRIP-MCNC: >300 MG/DL
RBC # BLD AUTO: 4.32 M/UL (ref 4.2–5.3)
RBC #/AREA URNS HPF: ABNORMAL /HPF (ref 0–5)
SODIUM SERPL-SCNC: 143 MMOL/L (ref 136–145)
SP GR UR REFRACTOMETRY: >1.03 (ref 1–1.03)
UROBILINOGEN UR QL STRIP.AUTO: 0.2 EU/DL (ref 0.2–1)
WBC # BLD AUTO: 8.7 K/UL (ref 4.6–13.2)
WBC URNS QL MICRO: ABNORMAL /HPF (ref 0–4)

## 2019-08-03 PROCEDURE — 87086 URINE CULTURE/COLONY COUNT: CPT

## 2019-08-03 PROCEDURE — 81001 URINALYSIS AUTO W/SCOPE: CPT

## 2019-08-03 PROCEDURE — 99283 EMERGENCY DEPT VISIT LOW MDM: CPT

## 2019-08-03 PROCEDURE — 80048 BASIC METABOLIC PNL TOTAL CA: CPT

## 2019-08-03 PROCEDURE — 85025 COMPLETE CBC W/AUTO DIFF WBC: CPT

## 2019-08-03 PROCEDURE — 83605 ASSAY OF LACTIC ACID: CPT

## 2019-08-03 PROCEDURE — 87040 BLOOD CULTURE FOR BACTERIA: CPT

## 2019-08-03 RX ORDER — CEPHALEXIN 500 MG/1
500 CAPSULE ORAL 3 TIMES DAILY
Qty: 21 CAP | Refills: 0 | Status: SHIPPED | OUTPATIENT
Start: 2019-08-03 | End: 2019-08-10

## 2019-08-03 NOTE — DISCHARGE INSTRUCTIONS
Patient Education        Dehydration: Care Instructions  Your Care Instructions  Dehydration happens when your body loses too much fluid. This might happen when you do not drink enough water or you lose large amounts of fluids from your body because of diarrhea, vomiting, or sweating. Severe dehydration can be life-threatening. Water and minerals called electrolytes help put your body fluids back in balance. Learn the early signs of fluid loss, and drink more fluids to prevent dehydration. Follow-up care is a key part of your treatment and safety. Be sure to make and go to all appointments, and call your doctor if you are having problems. It's also a good idea to know your test results and keep a list of the medicines you take. How can you care for yourself at home? · To prevent dehydration, drink plenty of fluids, enough so that your urine is light yellow or clear like water. Choose water and other caffeine-free clear liquids until you feel better. If you have kidney, heart, or liver disease and have to limit fluids, talk with your doctor before you increase the amount of fluids you drink. · If you do not feel like eating or drinking, try taking small sips of water, sports drinks, or other rehydration drinks. · Get plenty of rest.  To prevent dehydration  · Add more fluids to your diet and daily routine, unless your doctor has told you not to. · During hot weather, drink more fluids. Drink even more fluids if you exercise a lot. Stay away from drinks with alcohol or caffeine. · Watch for the symptoms of dehydration. These include:  ? A dry, sticky mouth. ? Dark yellow urine, and not much of it. ? Dry and sunken eyes. ? Feeling very tired. · Learn what problems can lead to dehydration. These include:  ? Diarrhea, fever, and vomiting. ? Any illness with a fever, such as pneumonia or the flu. ?  Activities that cause heavy sweating, such as endurance races and heavy outdoor work in hot or humid weather. ? Alcohol or drug use or problems caused by quitting their use (withdrawal). ? Certain medicines, such as cold and allergy pills (antihistamines), diet pills (diuretics), and laxatives. ? Certain diseases, such as diabetes, cancer, and heart or kidney disease. When should you call for help? Call 911 anytime you think you may need emergency care. For example, call if:    · You passed out (lost consciousness).    Call your doctor now or seek immediate medical care if:    · You are confused and cannot think clearly.     · You are dizzy or lightheaded, or you feel like you may faint.     · You have signs of needing more fluids. You have sunken eyes and a dry mouth, and you pass only a little dark urine.     · You cannot keep fluids down.    Watch closely for changes in your health, and be sure to contact your doctor if:    · You are not making tears.     · Your skin is very dry and sags slowly back into place after you pinch it.     · Your mouth and eyes are very dry. Where can you learn more? Go to http://juju-karol.info/. Enter Y042 in the search box to learn more about \"Dehydration: Care Instructions. \"  Current as of: September 23, 2018  Content Version: 12.1  © 1648-6221 [employer]. Care instructions adapted under license by [employer] (which disclaims liability or warranty for this information). If you have questions about a medical condition or this instruction, always ask your healthcare professional. [place] [address] any warranty or liability for your use of this information. Patient Education        Oral Rehydration: Care Instructions  Your Care Instructions    Dehydration occurs when your body loses too much water. This can happen if you do not drink enough fluids or lose a lot of fluid due to diarrhea, vomiting, or sweating. Being dehydrated can cause health problems and can even be life-threatening.   To replace lost fluids, you need to drink liquid that contains special chemicals called electrolytes. Electrolytes keep your body working well. Plain water does not have electrolytes. You also need to rest to prevent more fluid loss. Replacing water and electrolytes (oral rehydration) completely takes about 36 hours. But you should feel better within a few hours. Follow-up care is a key part of your treatment and safety. Be sure to make and go to all appointments, and call your doctor if you are having problems. It's also a good idea to know your test results and keep a list of the medicines you take. How can you care for yourself at home? · Take frequent sips of a drink such as Gatorade, Powerade, or other rehydration drinks that your doctor suggests. These replace both fluid and important chemicals (electrolytes) you need for balance in your blood. · Drink 2 quarts of cool liquid over 2 to 4 hours. You should have at least 10 glasses of liquid a day to replace lost fluid. If you have kidney, heart, or liver disease and have to limit fluids, talk with your doctor before you increase the amount of fluids you drink. · Make your own drink. Measure everything carefully. The drink may not work well or may even be harmful if the amounts are off. ? 1 quart water  ? ½ teaspoon salt  ? 6 teaspoons sugar  · Do not drink liquid with caffeine, such as coffee and katt. · Do not drink any alcohol. It can make you dehydrated. · Drink plenty of fluids, enough so that your urine is light yellow or clear like water. If you have kidney, heart, or liver disease and have to limit fluids, talk with your doctor before you increase the amount of fluids you drink. When should you call for help? Call 911 anytime you think you may need emergency care. For example, call if:    · You have signs of severe dehydration, such as:  ?  You are confused or unable to stay awake.  ? You passed out (lost consciousness).    Call your doctor now or seek immediate medical care if:    · You still have signs of dehydration. You have sunken eyes and a dry mouth, and you pass only a little dark urine.     · You are dizzy or lightheaded, or you feel like you may faint.     · You are not able to keep down fluids.    Watch closely for changes in your health, and be sure to contact your doctor if:    · You do not get better as expected. Where can you learn more? Go to http://juju-karol.info/. Enter I040 in the search box to learn more about \"Oral Rehydration: Care Instructions. \"  Current as of: September 23, 2018  Content Version: 12.1  © 4134-0406 [employer]. Care instructions adapted under license by [employer] (which disclaims liability or warranty for this information). If you have questions about a medical condition or this instruction, always ask your healthcare professional. [place] [address] any warranty or liability for your use of this information. Patient Education        Urinary Tract Infection in Women: Care Instructions  Your Care Instructions    A urinary tract infection, or UTI, is a general term for an infection anywhere between the kidneys and the urethra (where urine comes out). Most UTIs are bladder infections. They often cause pain or burning when you urinate. UTIs are caused by bacteria and can be cured with antibiotics. Be sure to complete your treatment so that the infection goes away. Follow-up care is a key part of your treatment and safety. Be sure to make and go to all appointments, and call your doctor if you are having problems. It's also a good idea to know your test results and keep a list of the medicines you take. How can you care for yourself at home? · Take your antibiotics as directed. Do not stop taking them just because you feel better. You need to take the full course of antibiotics. · Drink extra water and other fluids for the next day or two.  This may help wash out the bacteria that are causing the infection. (If you have kidney, heart, or liver disease and have to limit fluids, talk with your doctor before you increase your fluid intake.)  · Avoid drinks that are carbonated or have caffeine. They can irritate the bladder. · Urinate often. Try to empty your bladder each time. · To relieve pain, take a hot bath or lay a heating pad set on low over your lower belly or genital area. Never go to sleep with a heating pad in place. To prevent UTIs  · Drink plenty of water each day. This helps you urinate often, which clears bacteria from your system. (If you have kidney, heart, or liver disease and have to limit fluids, talk with your doctor before you increase your fluid intake.)  · Urinate when you need to. · Urinate right after you have sex. · Change sanitary pads often. · Avoid douches, bubble baths, feminine hygiene sprays, and other feminine hygiene products that have deodorants. · After going to the bathroom, wipe from front to back. When should you call for help? Call your doctor now or seek immediate medical care if:    · Symptoms such as fever, chills, nausea, or vomiting get worse or appear for the first time.     · You have new pain in your back just below your rib cage. This is called flank pain.     · There is new blood or pus in your urine.     · You have any problems with your antibiotic medicine.    Watch closely for changes in your health, and be sure to contact your doctor if:    · You are not getting better after taking an antibiotic for 2 days.     · Your symptoms go away but then come back. Where can you learn more? Go to http://juju-karol.info/. Enter Y768 in the search box to learn more about \"Urinary Tract Infection in Women: Care Instructions. \"  Current as of: December 19, 2018  Content Version: 12.1  © 1711-8790 [email or web address].  Care instructions adapted under license by [employer] (which disclaims liability or warranty for this information). If you have questions about a medical condition or this instruction, always ask your healthcare professional. [address] any warranty or liability for your use of this information.

## 2019-08-03 NOTE — ED TRIAGE NOTES
Pt c/o urinary incontinence for one and 1/2 weeks. Also c/o frequency and burning. Pt states Dr. Abiola Bond gave her a pill to help with incontinence, but its not working

## 2019-08-03 NOTE — ED PROVIDER NOTES
EMERGENCY DEPARTMENT HISTORY AND PHYSICAL EXAM 
 
Date: 8/3/2019 Patient Name: Rodolfo Vences History of Presenting Illness Chief Complaint Patient presents with  Urinary Frequency  Urinary Incontinence History Provided By: Patient and niece Chief Complaint: Urinary frequency, dysuria and worsening urinary incontinence Duration: Few days Timing: Gradually worsening Location: N/A Quality: Burning Severity: 9/10 Modifying Factors: Worse after urinating Associated Symptoms: none Additional History (Context): Rodolfo Vences is a 80 y.o. female with a history of urinary incontinence, hypertension, GERD who presents today with her niece for urinary frequency, dysuria and worsening urinary. Patient denies any trauma or injury or loss of stool. Patient reports urinary incontinence is an ongoing issue for her. Patient has been seen and evaluated for this by her primary care doctor and is currently being treated. Reports reports burning after urination. Denies any fevers or chills. Denies any nausea or vomiting. Denies any abdominal pain or back pain. PCP: Dong Knox MD 
 
Current Facility-Administered Medications Medication Dose Route Frequency Provider Last Rate Last Dose  cefTRIAXone (ROCEPHIN) 1 g in sterile water (preservative free) 10 mL IV syringe  1 g IntraVENous NOW Dhara Luis, [address]      
 sodium chloride 0.9 % bolus infusion 1,000 mL  1,000 mL IntraVENous ONCE Dhara Luis [address] Current Outpatient Medications Medication Sig Dispense Refill  cephALEXin (KEFLEX) 500 mg capsule Take 1 Cap by mouth three (3) times daily for 7 days. 21 Cap 0  
 metoprolol succinate (TOPROL-XL) 25 mg XL tablet TAKE 1 TABLET BY MOUTH EVERY DAY 90 Tab 3  chlorthalidone (HYGROTEN) 25 mg tablet Take 1 Tab by mouth daily. 90 Tab 3  
 lisinopril (PRINIVIL, ZESTRIL) 20 mg tablet Take 1 Tab by mouth daily.  90 Tab 3  
  lansoprazole (PREVACID) 30 mg capsule TAKE 1 CAPSULE DAILY       (BEFORE BREAKFAST) 90 Cap 3  terazosin (HYTRIN) 5 mg capsule Take 1 Cap by mouth nightly. 90 Cap 3  
 gemfibrozil (LOPID) 600 mg tablet Take 1 Tab by mouth daily. 90 Tab 3  
 metFORMIN (GLUCOPHAGE) 500 mg tablet TAKE 1 TABLET BY MOUTH TWICE A DAY WITH FOOD 180 Tab 2  
 digoxin (LANOXIN) 0.125 mg tablet TAKE 1 TABLET DAILY 90 Tab 3  
 KLOR-CON M20 20 mEq tablet TAKE 1 TABLET DAILY 90 Tab 3  potassium chloride (K-DUR, KLOR-CON) 20 mEq tablet Take 1 Tab by mouth daily. 90 Tab 3  polyethylene glycol (MIRALAX) 17 gram/dose powder Take 17 g by mouth daily. 1 tablespoon with 8 oz of water daily 238 g 0  
 mirabegron ER (MYRBETRIQ) 25 mg ER tablet Take 1 Tab by mouth daily. 90 Tab 3  
 acetaminophen (TYLENOL EXTRA STRENGTH) 500 mg tablet Take  by mouth every six (6) hours as needed for Pain.  aspirin 81 mg tablet Take 1 Tab by mouth daily. 1 Tab 0  
 glucose blood VI test strips (ACCU-CHEK COMPACT TEST) strip by Does Not Apply route. Use as directed 1 Package 11 Past History Past Medical History: 
Past Medical History:  
Diagnosis Date  Cardiac echocardiogram 03/21/2008 EF 60-65%. No RWMA. RVSP 35 mmHg. Mild AI. Mild MR. Mild TR. Mild PI.  Cardiac nuclear imaging test 03/29/2004 A-fib. No evidence of ischemia or prior infarction. EF 59%. No WMA.  Cardiovascular lower extremity venous duplex 10/05/2011 No deep vein or superficial thrombosis bilaterally. Bilateral calf veins w/calcific changes.  Diabetes ([address] 04/2006  Essential hypertension, benign  GERD (gastroesophageal reflux disease)  Hypercholesterolemia  Hypertension  Long term (current) use of anticoagulants 3/14/2011  Paroxysmal atrial fibrillation (HCC)  S/P total hysterectomy late 1960's Past Surgical History: 
Past Surgical History:  
Procedure Laterality Date  HX CATARACT REMOVAL    
 bilateral  
  HX HYSTERECTOMY  late 8550'F Family History: 
Family History Problem Relation Age of Onset  Hypertension Mother  Heart Failure Father  at age 80 from CHF  Diabetes Sister  Diabetes Brother Social History: 
Social History Tobacco Use  Smoking status: Never Smoker  Smokeless tobacco: Never Used Substance Use Topics  Alcohol use: No  
 Drug use: No  
 
 
Allergies: 
No Known Allergies Review of Systems Review of Systems Constitutional: Negative for chills and fever. HENT: Negative for congestion, rhinorrhea and sore throat. Respiratory: Negative for cough and shortness of breath. Cardiovascular: Negative for chest pain. Gastrointestinal: Negative for abdominal pain, blood in stool, constipation, diarrhea, nausea and vomiting. Genitourinary: Positive for dysuria and frequency. Negative for hematuria, pelvic pain, urgency and vaginal discharge. Musculoskeletal: Negative for back pain and myalgias. Skin: Negative for rash and wound. Neurological: Negative for dizziness and headaches. All other systems reviewed and are negative. All Other Systems Negative Physical Exam  
 
Vitals:  
 19 1335 BP: 107/56 Pulse: (!) 57 Resp: 20 Temp: 99.2 °F (37.3 °C) SpO2: 98% Weight: 59 kg (130 lb) Height: 5' 3\" (1.6 m) Physical Exam  
Constitutional: She is oriented to person, place, and time. She appears well-developed and well-nourished. No distress. HENT:  
Head: Normocephalic and atraumatic. Eyes: Conjunctivae are normal.  
Neck: Normal range of motion. Neck supple. Cardiovascular: Normal rate, regular rhythm and normal heart sounds. Pulmonary/Chest: Effort normal and breath sounds normal. No respiratory distress. She exhibits no tenderness. Abdominal: Soft. Bowel sounds are normal. She exhibits no distension. There is no tenderness. There is no rebound and no guarding. Musculoskeletal: She exhibits no edema or deformity. Neurological: She is alert and oriented to person, place, and time. She has normal reflexes. Skin: Skin is warm and dry. She is not diaphoretic. Psychiatric: She has a normal mood and affect. Her behavior is normal.  
Nursing note and vitals reviewed. Diagnostic Study Results Labs - Recent Results (from the past 12 hour(s)) URINALYSIS W/ RFLX MICROSCOPIC Collection Time: 08/03/19  1:45 PM  
Result Value Ref Range Color YELLOW Appearance OPAQUE Specific gravity >1.030 (H) 1.003 - 1.030  
 pH (UA) 6.0 5.0 - 8.0 Protein >300 (A) NEG mg/dL Glucose NEGATIVE  NEG mg/dL Ketone NEGATIVE  NEG mg/dL Bilirubin NEGATIVE  NEG Blood MODERATE (A) NEG Urobilinogen 0.2 0.2 - 1.0 EU/dL Nitrites NEGATIVE  NEG Leukocyte Esterase LARGE (A) NEG URINE MICROSCOPIC ONLY Collection Time: 08/03/19  1:45 PM  
Result Value Ref Range WBC TOO NUMEROUS TO COUNT 0 - 4 /hpf  
 RBC 0 to 3 0 - 5 /hpf Epithelial cells FEW 0 - 5 /lpf Bacteria 4+ (A) NEG /hpf  
CBC WITH AUTOMATED DIFF Collection Time: 08/03/19  2:20 PM  
Result Value Ref Range WBC 8.7 4.6 - 13.2 K/uL  
 RBC 4.32 4.20 - 5.30 M/uL  
 HGB 11.3 (L) 12.0 - 16.0 g/dL HCT 36.7 35.0 - 45.0 % MCV 85.0 74.0 - 97.0 FL  
 MCH 26.2 24.0 - 34.0 PG  
 MCHC 30.8 (L) 31.0 - 37.0 g/dL  
 RDW 14.9 (H) 11.6 - 14.5 % PLATELET 465 340 - 728 K/uL MPV 9.7 9.2 - 11.8 FL  
 NEUTROPHILS 73 40 - 73 % LYMPHOCYTES 18 (L) 21 - 52 % MONOCYTES 7 3 - 10 % EOSINOPHILS 2 0 - 5 % BASOPHILS 0 0 - 2 %  
 ABS. NEUTROPHILS 6.3 1.8 - 8.0 K/UL  
 ABS. LYMPHOCYTES 1.6 0.9 - 3.6 K/UL  
 ABS. MONOCYTES 0.6 0.05 - 1.2 K/UL  
 ABS. EOSINOPHILS 0.2 0.0 - 0.4 K/UL  
 ABS. BASOPHILS 0.0 0.0 - 0.1 K/UL  
 DF AUTOMATED METABOLIC PANEL, BASIC Collection Time: 08/03/19  2:20 PM  
Result Value Ref Range Sodium 143 136 - 145 mmol/L  Potassium 4.0 3.5 - 5.5 mmol/L  
 Chloride 108 100 - 111 mmol/L  
 CO2 29 21 - 32 mmol/L Anion gap 6 3.0 - 18 mmol/L Glucose 144 (H) 74 - 99 mg/dL BUN 25 (H) 7.0 - 18 MG/DL Creatinine 1.16 0.6 - 1.3 MG/DL  
 BUN/Creatinine ratio 22 (H) 12 - 20 GFR est AA 52 (L) >60 ml/min/1.73m2 GFR est non-AA 43 (L) >60 ml/min/1.73m2 Calcium 9.7 8.5 - 10.1 MG/DL POC LACTIC ACID Collection Time: 08/03/19  2:24 PM  
Result Value Ref Range Lactic Acid (POC) 1.24 0.40 - 2.00 mmol/L Radiologic Studies - No orders to display CT Results  (Last 48 hours) None CXR Results  (Last 48 hours) None Medical Decision Making I am the first provider for this patient. I reviewed the vital signs, available nursing notes, past medical history, past surgical history, family history and social history. Vital Signs-Reviewed the patient's vital signs. Records Reviewed: Nursing Notes and Old Medical Records Procedures: None Procedures Provider Notes (Medical Decision Making):  
 
Differential: UTI, pyelonephritis, Sirs/sepsis Plan: Patient does not meet SIRS criteria at this time. Will order labs and ua. 4:03 PM 
Have discussed work-up with many family members and patient. Patient is very well-appearing for her age. Will give dose of Rocephin prior to discharge and fluids. Have discussed the importance of hydration and have given family multiple alternatives to just plain water. Have stressed the importance of PCP and urology follow-up for urinary incontinence. Strict return precautions have been given. Patient agrees with the plan and management and states all questions have been thoroughly answered and there are no more remaining questions. MED RECONCILIATION: 
Current Facility-Administered Medications Medication Dose Route Frequency  cefTRIAXone (ROCEPHIN) 1 g in sterile water (preservative free) 10 mL IV syringe  1 g IntraVENous NOW  
  sodium chloride 0.9 % bolus infusion 1,000 mL  1,000 mL IntraVENous ONCE Current Outpatient Medications Medication Sig  cephALEXin (KEFLEX) 500 mg capsule Take 1 Cap by mouth three (3) times daily for 7 days.  metoprolol succinate (TOPROL-XL) 25 mg XL tablet TAKE 1 TABLET BY MOUTH EVERY DAY  chlorthalidone (HYGROTEN) 25 mg tablet Take 1 Tab by mouth daily.  lisinopril (PRINIVIL, ZESTRIL) 20 mg tablet Take 1 Tab by mouth daily.  lansoprazole (PREVACID) 30 mg capsule TAKE 1 CAPSULE DAILY       (BEFORE BREAKFAST)  terazosin (HYTRIN) 5 mg capsule Take 1 Cap by mouth nightly.  gemfibrozil (LOPID) 600 mg tablet Take 1 Tab by mouth daily.  metFORMIN (GLUCOPHAGE) 500 mg tablet TAKE 1 TABLET BY MOUTH TWICE A DAY WITH FOOD  digoxin (LANOXIN) 0.125 mg tablet TAKE 1 TABLET DAILY  KLOR-CON M20 20 mEq tablet TAKE 1 TABLET DAILY  potassium chloride (K-DUR, KLOR-CON) 20 mEq tablet Take 1 Tab by mouth daily.  polyethylene glycol (MIRALAX) 17 gram/dose powder Take 17 g by mouth daily. 1 tablespoon with 8 oz of water daily  mirabegron ER (MYRBETRIQ) 25 mg ER tablet Take 1 Tab by mouth daily.  acetaminophen (TYLENOL EXTRA STRENGTH) 500 mg tablet Take  by mouth every six (6) hours as needed for Pain.  aspirin 81 mg tablet Take 1 Tab by mouth daily.  glucose blood VI test strips (ACCU-CHEK COMPACT TEST) strip by Does Not Apply route. Use as directed Disposition: 
Home DISCHARGE NOTE:  
Pt has been reexamined. Patient has no new complaints, changes, or physical findings. Care plan outlined and precautions discussed. Results of workup were reviewed with the patient. All medications were reviewed with the patient. All of pt's questions and concerns were addressed. Patient was instructed and agrees to follow up with PCP/Urology  as well as to return to the ED upon further deterioration. Patient is ready to go home. Follow-up Information Follow up With Specialties Details Why Contact Info [id number] [facility] EMERGENCY DEPT Emergency Medicine  As needed [address] [id number] [phone] Hai Vegas MD Family Practice In 2 days  [address] [facility] [address] [facility] [address] [phone] Urology of [address] 
[phone] Current Discharge Medication List  
  
START taking these medications Details  
cephALEXin (KEFLEX) 500 mg capsule Take 1 Cap by mouth three (3) times daily for 7 days. Qty: 21 Cap, Refills: 0 Diagnosis Clinical Impression: 1. Acute cystitis with hematuria 2. Urinary incontinence, unspecified type 3. Dehydration

## 2019-08-06 ENCOUNTER — OFFICE VISIT (OUTPATIENT)
Dept: FAMILY MEDICINE CLINIC | Age: 84
End: 2019-08-06

## 2019-08-06 VITALS
TEMPERATURE: 98.2 F | DIASTOLIC BLOOD PRESSURE: 50 MMHG | HEIGHT: 63 IN | RESPIRATION RATE: 20 BRPM | OXYGEN SATURATION: 98 % | WEIGHT: 124.6 LBS | BODY MASS INDEX: 22.08 KG/M2 | HEART RATE: 62 BPM | SYSTOLIC BLOOD PRESSURE: 112 MMHG

## 2019-08-06 DIAGNOSIS — N81.10 BLADDER PROLAPSE, FEMALE, ACQUIRED: Primary | ICD-10-CM

## 2019-08-06 LAB
BACTERIA SPEC CULT: ABNORMAL
BACTERIA SPEC CULT: ABNORMAL
SERVICE CMNT-IMP: ABNORMAL

## 2019-08-06 RX ORDER — HYDROCHLOROTHIAZIDE 12.5 MG/1
12.5 TABLET ORAL DAILY
Qty: 30 TAB | Refills: 6 | Status: SHIPPED | OUTPATIENT
Start: 2019-08-06 | End: 2019-11-18

## 2019-08-06 NOTE — PATIENT INSTRUCTIONS
Hydrocortisone (On the skin) Hydrocortisone (sqd-qske-IHT-ti-sone) Treats skin irritation, allergic reactions, and other types of skin problems. This medicine is a corticosteroid. Brand Name(s): Ala-Riccardo, Ala-Scalp HP, Aqua Glycolic HC Scalp & Body, Aquanil Hc, Aveeno 1% Hydrocortisone Anti-Itch Cream, Corta-Cap, Cortaid Maximum Strength, Corticool Maximum Strength, Cortizone-10 Cooling Relief, Cortizone-10 Diabetics' Skin, Cortizone-10 Easy Relief, Cortizone-10 Eczema, Cortizone-10 Intensive Healing, Cortizone-10 Maximum Strength, Dermarest Dricort There may be other brand names for this medicine. When This Medicine Should Not Be Used: This medicine is not right for everyone. Do not use it if you had an allergic reaction to hydrocortisone. How to Use This Medicine:  
Cream, Kit, Ointment, Lotion, Spray, Foam, Gel/Jelly, Stick, Liquid · Take your medicine as directed. Your dose may need to be changed several times to find what works best for you. · Read and follow the patient instructions that come with this medicine. Talk to your doctor or pharmacist if you have any questions. · Follow the instructions on the medicine label if you are using this medicine without a prescription. · This medicine is for use on skin only. Do not get it in your eyes, nose, or mouth. · Do not inhale this medicine or use it near heat or an open flame. Do not puncture, break, or burn the aerosol can. · .Wash your hands with soap and water before and after you use this medicine. · Lotion, cream, ointment, gel, or liquid:Apply a thin layer of the medicine to the affected area. Rub it in gently. · Spray:Shake the aerosol can just before using. Hold the can 3 to 6 inches from the skin and spray for about 2 seconds. To apply to your face, spray into the palm of your hand and rub in gently.  
· Do not cover the treated area with a bandage unless directed by your doctor. Do not cover the treated skin with tight-fitting diapers or plastic pants if you are using this medicine on a child's diaper area. · Missed dose: Apply a dose as soon as you can. If it is almost time for your next dose, wait until then and apply a regular dose. Do not apply extra medicine to make up for a missed dose. Drugs and Foods to Avoid: Ask your doctor or pharmacist before using any other medicine, including over-the-counter medicines, vitamins, and herbal products. Warnings While Using This Medicine: · Tell your doctor if you are pregnant or breastfeeding. · This medicine may cause adrenal gland problems. It may also cause you to get infections more easily, so tell your doctor if you have any type of infection. · This medicine may delay growth in children. If you think your child is not growing properly while using this medicine, talk with your doctor. · Do not use this medicine to treat a skin problem your doctor has not examined. · Call your doctor if your symptoms do not improve or if they get worse. · Keep all medicine out of the reach of children. Never share your medicine with anyone. Possible Side Effects While Using This Medicine:  
Call your doctor right away if you notice any of these side effects: · Allergic reaction: Itching or hives, swelling in your face or hands, swelling or tingling in your mouth or throat, chest tightness, trouble breathing · Dark freckles, skin color changes, coldness, weakness, tiredness, nausea, vomiting, weight loss · Rapid weight gain around your neck, upper back, breast, face, or waist 
· Redness, swelling, or pus If you notice these less serious side effects, talk with your doctor: · Burning, itching, irritation, dryness · Poor healing of skin If you notice other side effects that you think are caused by this medicine, tell your doctor. Call your doctor for medical advice about side effects.  You may report side effects to FDA at 9-124-FDA-5900 © 2017 [address] Information is for End User's use only and may not be sold, redistributed or otherwise used for commercial purposes. The above information is an  only. It is not intended as medical advice for individual conditions or treatments. Talk to your doctor, nurse or pharmacist before following any medical regimen to see if it is safe and effective for you. Pelvic Prolapse: Before Your Surgery What is surgery for pelvic prolapse? Your pelvic muscles hold your pelvic organs in place. If they become weak, your uterus, bowel, or bladder may press against your vagina. This is called a pelvic prolapse. Surgery puts your organ back in place. It also adds support to your muscles. You will be asleep during the surgery. You will not feel pain. The doctor can do the surgery in two ways. In open surgery, the doctor makes a cut in your belly or inside the vagina. The cut is called an incision. In laparoscopic surgery, the doctor puts a lighted tube and other surgical tools through small incisions near your belly button and groin. This tube is called a scope. It lets the doctor see your organs. If you have open surgery, you will go home in 1 to 4 days. It usually takes about 4 to 6 weeks to fully recover. If you have laparoscopic surgery, you will go home in 1 or 2 days. It usually takes 1 to 2 weeks to fully recover. At home, you may need to wear a catheter. This is a tube in your bladder. It carries urine out of your body. After surgery, you may have less pain during sex. The surgery may also help with any bladder or bowel problems you may have had. If you still have your uterus, your ability to have children will not be affected. Follow-up care is a key part of your treatment and safety.  Be sure to make and go to all appointments, and call your doctor if you are having problems. It's also a good idea to know your test results and keep a list of the medicines you take. What happens before surgery? 
 Surgery can be stressful. This information will help you understand what you can expect. And it will help you safely prepare for surgery. 
 Preparing for surgery 
  · Understand exactly what surgery is planned, along with the risks, benefits, and other options. · Tell your doctors ALL the medicines, vitamins, supplements, and herbal remedies you take. Some of these can increase the risk of bleeding or interact with anesthesia.  
  · If you take blood thinners, such as warfarin (Coumadin), clopidogrel (Plavix), or aspirin, be sure to talk to your doctor. He or she will tell you if you should stop taking these medicines before your surgery. Make sure that you understand exactly what your doctor wants you to do.  
  · Your doctor will tell you which medicines to take or stop before your surgery. You may need to stop taking certain medicines a week or more before surgery. So talk to your doctor as soon as you can.  
  · If you have an advance directive, let your doctor know. It may include a living will and a durable power of  for health care. Bring a copy to the hospital. If you don't have one, you may want to prepare one. It lets your doctor and loved ones know your health care wishes. Doctors advise that everyone prepare these papers before any type of surgery or procedure.  
  · You may need to take a laxative or enema before surgery. Your doctor will tell you how to do this. What happens on the day of surgery? · Follow the instructions exactly about when to stop eating and drinking. If you don't, your surgery may be canceled. If your doctor told you to take your medicines on the day of surgery, take them with only a sip of water.  
  · Take a bath or shower before you come in for your surgery. Do not apply lotions, perfumes, deodorants, or nail polish.   · Do not shave the surgical site yourself.  
  · Take off all jewelry and piercings. And take out contact lenses, if you wear them.  
 At the hospital or surgery center · Bring a picture ID.  
  · The area for surgery is often marked to make sure there are no errors.  
  · You will be kept comfortable and safe by your anesthesia provider. You will be asleep during the surgery.  
  · The surgery will take 45 minutes to 2 hours. Going home · Be sure you have someone to drive you home. Anesthesia and pain medicine make it unsafe for you to drive.  
  · You will be given more specific instructions about recovering from your surgery. They will cover things like diet, wound care, follow-up care, driving, and getting back to your normal routine. When should you call your doctor? · You have questions or concerns.  
  · You don't understand how to prepare for your surgery.  
  · You become ill before the surgery (such as fever, flu, or a cold).  
  · You need to reschedule or have changed your mind about having the surgery. Where can you learn more? Go to http://juju-karol.info/. Enter P339 in the search box to learn more about \"Pelvic Prolapse: Before Your Surgery. \" Current as of: February 19, 2019 Content Version: 12.1 © 8862-2215 [employer]. Care instructions adapted under license by [employer] (which disclaims liability or warranty for this information). If you have questions about a medical condition or this instruction, always ask your healthcare professional. [place] [address] any warranty or liability for your use of this information.

## 2019-08-06 NOTE — PROGRESS NOTES
HISTORY OF PRESENT ILLNESS Benjamin Santana is a 80 y.o. female. HPI Patient is here today for evaluation and treatment of: ER Visit Follow Up: 
 
Had an ED visit over the weekend ( Sat) ; Had dysuria And incontinence She states her sx are worse when she stands; When she stands urine will automatically leak. She feels a fullness in the pelvis also when she stands. She also has irritation around the vaginal opening as well and this burns when she urinates. Current Outpatient Medications:  
  cephALEXin (KEFLEX) 500 mg capsule, Take 1 Cap by mouth three (3) times daily for 7 days. , Disp: 21 Cap, Rfl: 0 
  metoprolol succinate (TOPROL-XL) 25 mg XL tablet, TAKE 1 TABLET BY MOUTH EVERY DAY, Disp: 90 Tab, Rfl: 3   chlorthalidone (HYGROTEN) 25 mg tablet, Take 1 Tab by mouth daily. , Disp: 90 Tab, Rfl: 3 
  lisinopril (PRINIVIL, ZESTRIL) 20 mg tablet, Take 1 Tab by mouth daily. , Disp: 90 Tab, Rfl: 3 
  lansoprazole (PREVACID) 30 mg capsule, TAKE 1 CAPSULE DAILY       (BEFORE BREAKFAST), Disp: 90 Cap, Rfl: 3 
  terazosin (HYTRIN) 5 mg capsule, Take 1 Cap by mouth nightly., Disp: 90 Cap, Rfl: 3 
  gemfibrozil (LOPID) 600 mg tablet, Take 1 Tab by mouth daily. , Disp: 90 Tab, Rfl: 3 
  metFORMIN (GLUCOPHAGE) 500 mg tablet, TAKE 1 TABLET BY MOUTH TWICE A DAY WITH FOOD, Disp: 180 Tab, Rfl: 2 
  digoxin (LANOXIN) 0.125 mg tablet, TAKE 1 TABLET DAILY, Disp: 90 Tab, Rfl: 3 
  KLOR-CON M20 20 mEq tablet, TAKE 1 TABLET DAILY, Disp: 90 Tab, Rfl: 3 
  potassium chloride (K-DUR, KLOR-CON) 20 mEq tablet, Take 1 Tab by mouth daily. , Disp: 90 Tab, Rfl: 3 
  polyethylene glycol (MIRALAX) 17 gram/dose powder, Take 17 g by mouth daily. 1 tablespoon with 8 oz of water daily, Disp: 238 g, Rfl: 0 
  mirabegron ER (MYRBETRIQ) 25 mg ER tablet, Take 1 Tab by mouth daily. , Disp: 90 Tab, Rfl: 3 
  acetaminophen (TYLENOL EXTRA STRENGTH) 500 mg tablet, Take  by mouth every six (6) hours as needed for Pain., Disp: , Rfl:  
   aspirin 81 mg tablet, Take 1 Tab by mouth daily. , Disp: 1 Tab, Rfl: 0 
  glucose blood VI test strips (ACCU-CHEK COMPACT TEST) strip, by Does Not Apply route. Use as directed, Disp: 1 Package, Rfl: 11 Wt Readings from Last 3 Encounters:  
08/06/19 124 lb 9.6 oz (56.5 kg) 08/03/19 130 lb (59 kg) 07/22/19 127 lb 3.2 oz (57.7 kg) PMH,  Meds, Allergies, Family History, Social history reviewed Review of Systems Constitutional: Negative for chills and fever. Cardiovascular: Negative for chest pain and palpitations. Physical Exam  
Constitutional: She appears well-developed and well-nourished. No distress. HENT:  
Mouth/Throat: Oropharynx is clear and moist.  
Neck: Neck supple. Cardiovascular: Normal rate and regular rhythm. Exam reveals no gallop and no friction rub. No murmur heard. Pulmonary/Chest: Breath sounds normal. No respiratory distress. She has no wheezes. She has no rales. Genitourinary:  
Genitourinary Comments: + bladder prolapse ; + vaginal introitus with some excoriated areas distally;  irritated and inflammed Nursing note and vitals reviewed. Visit Vitals /50 (BP 1 Location: Left arm, BP Patient Position: Sitting) Pulse 62 Temp 98.2 °F (36.8 °C) (Oral) Resp 20 Ht 5' 3\" (1.6 m) Wt 124 lb 9.6 oz (56.5 kg) SpO2 98% BMI 22.07 kg/m² ASSESSMENT and PLAN 
  ICD-10-CM ICD-9-CM 1. Bladder prolapse, female, acquired N81.10 618.01 REFERRAL TO FEMALE PELVIC MEDICINE AND RECONSTRUCTIVE SURGERY As above, new 
 treatment plan as listed below Orders Placed This Encounter  REFERRAL TO FEMALE PELVIC MEDICINE AND RECONSTRUCTIVE SURGERY  hydroCHLOROthiazide (HYDRODIURIL) 12.5 mg tablet May consider hydrocortisone to area; that is irritated ; do not place inside vagina Follow-up and Dispositions · Return in about 3 months (around 11/6/2019), or pelvic prolapse. An After Visit Summary was printed and given to the patient. This has been fully explained to the patient, who indicates understanding.

## 2019-08-06 NOTE — PROGRESS NOTES
Patient here for a ER Visit Follow up. 1. Have you been to the ER, urgent care clinic since your last visit? Hospitalized since your last visit? Went to [facility] for urinary incontinnence and urinary frequency. 2. Have you seen or consulted any other health care providers outside of the [address] [place] [address] since your last visit? Include any pap smears or colon screening.  No

## 2019-08-09 LAB
BACTERIA SPEC CULT: NORMAL
BACTERIA SPEC CULT: NORMAL
SERVICE CMNT-IMP: NORMAL
SERVICE CMNT-IMP: NORMAL

## 2019-11-18 ENCOUNTER — OFFICE VISIT (OUTPATIENT)
Dept: FAMILY MEDICINE CLINIC | Age: 84
End: 2019-11-18

## 2019-11-18 VITALS
HEIGHT: 63 IN | TEMPERATURE: 98.6 F | RESPIRATION RATE: 16 BRPM | BODY MASS INDEX: 21.44 KG/M2 | SYSTOLIC BLOOD PRESSURE: 102 MMHG | OXYGEN SATURATION: 99 % | WEIGHT: 121 LBS | HEART RATE: 57 BPM | DIASTOLIC BLOOD PRESSURE: 52 MMHG

## 2019-11-18 DIAGNOSIS — R26.81 GAIT INSTABILITY: ICD-10-CM

## 2019-11-18 DIAGNOSIS — Z00.00 MEDICARE ANNUAL WELLNESS VISIT, SUBSEQUENT: Primary | ICD-10-CM

## 2019-11-18 NOTE — PATIENT INSTRUCTIONS
Medicare Wellness Visit, Female The best way to live healthy is to have a lifestyle where you eat a well-balanced diet, exercise regularly, limit alcohol use, and quit all forms of tobacco/nicotine, if applicable. Regular preventive services are another way to keep healthy. Preventive services (vaccines, screening tests, monitoring & exams) can help personalize your care plan, which helps you manage your own care. Screening tests can find health problems at the earliest stages, when they are easiest to treat. [facility] follows the current, evidence-based guidelines published by the [facility] Damon ([facility]) when recommending preventive services for our patients. Because we follow these guidelines, sometimes recommendations change over time as research supports it. (For example, mammograms used to be recommended annually. Even though Medicare will still pay for an annual mammogram, the newer guidelines recommend a mammogram every two years for women of average risk). Of course, you and your doctor may decide to screen more often for some diseases, based on your risk and your co-morbidities (chronic disease you are already diagnosed with). Preventive services for you include: - Medicare offers their members a free annual wellness visit, which is time for you and your primary care provider to discuss and plan for your preventive service needs. Take advantage of this benefit every year! 
-All adults over the age of 72 should receive the recommended pneumonia vaccines. Current USPSTF guidelines recommend a series of two vaccines for the best pneumonia protection.  
-All adults should have a flu vaccine yearly and a tetanus vaccine every 10 years.  
-All adults age 48 and older should receive the shingles vaccines (series of two vaccines). -All adults age 38-68 who are overweight should have a diabetes screening test once every three years. -All adults born between [birth year] and 1965 should be screened once for Hepatitis C. 
-Other screening tests and preventive services for persons with diabetes include: an eye exam to screen for diabetic retinopathy, a kidney function test, a foot exam, and stricter control over your cholesterol.  
-Cardiovascular screening for adults with routine risk involves an electrocardiogram (ECG) at intervals determined by your doctor.  
-Colorectal cancer screenings should be done for adults age 54-65 with no increased risk factors for colorectal cancer. There are a number of acceptable methods of screening for this type of cancer. Each test has its own benefits and drawbacks. Discuss with your doctor what is most appropriate for you during your annual wellness visit. The different tests include: colonoscopy (considered the best screening method), a fecal occult blood test, a fecal DNA test, and sigmoidoscopy. 
 
-A bone mass density test is recommended when a woman turns 65 to screen for osteoporosis. This test is only recommended one time, as a screening. Some providers will use this same test as a disease monitoring tool if you already have osteoporosis. -Breast cancer screenings are recommended every other year for women of normal risk, age 54-69. 
-Cervical cancer screenings for women over age 72 are only recommended with certain risk factors. Here is a list of your current Health Maintenance items (your personalized list of preventive services) with a due date: 
Health Maintenance Due Topic Date Due  Shingles Vaccine (1 of 2) 09[birth year]05/1967  Pneumococcal Vaccine (1 of 1 - PPSV23) 09/05/1982  Flu Vaccine  08/01/2019

## 2019-11-18 NOTE — PROGRESS NOTES
1. Have you been to the ER, urgent care clinic since your last visit? Hospitalized since your last visit? No 
 
2. Have you seen or consulted any other health care providers outside of the [address] [place] [address] since your last visit? Include any pap smears or colon screening.  Yes Where: gynecology - Marcie Stephenson MD

## 2019-11-18 NOTE — PROGRESS NOTES
This is the Subsequent Medicare Annual Wellness Exam, performed 12 months or more after the Initial AWV or the last Subsequent AWV I have reviewed the patient's medical history in detail and updated the computerized patient record. Has been to [address] She has a drop bladder. She has had a pessary placed. This has helped. She has less involuntary incontinence. Has been more forgetful recently. Has gait instability ; Son would like a wheechair ( manual) to get around more easily History Patient Active Problem List  
Diagnosis Code  Essential hypertension, benign I10  
 Hypercholesterolemia E78.00  GERD (gastroesophageal reflux disease) K21.9  Diabetes ([facility] [address] E11.9  Atrial fibrillation (HCC) I48.91  
 Paroxysmal atrial fibrillation (HCC) I48.0  Aortic valve disorder I35.9  
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  
Diagnosis Date  Cardiac echocardiogram 03/21/2008 EF 60-65%. No RWMA. RVSP 35 mmHg. Mild AI. Mild MR. Mild TR. Mild PI.  Cardiac nuclear imaging test 03/29/2004 A-fib. No evidence of ischemia or prior infarction. EF 59%. No WMA.  Cardiovascular lower extremity venous duplex 10/05/2011 No deep vein or superficial thrombosis bilaterally. Bilateral calf veins w/calcific changes.  Diabetes ([facility] [address] 04/2006  Essential hypertension, benign  GERD (gastroesophageal reflux disease)  Hypercholesterolemia  Hypertension  Long term (current) use of anticoagulants 3/14/2011  Paroxysmal atrial fibrillation (HCC)  S/P total hysterectomy late 1960's Past Surgical History:  
Procedure Laterality Date  HX CATARACT REMOVAL    
 bilateral  
 HX HYSTERECTOMY  late 1960's Current Outpatient Medications Medication Sig Dispense Refill  metoprolol succinate (TOPROL-XL) 25 mg XL tablet TAKE 1 TABLET BY MOUTH EVERY DAY 90 Tab 3  
 lisinopril (PRINIVIL, ZESTRIL) 20 mg tablet Take 1 Tab by mouth daily. 90 Tab 3  
 lansoprazole (PREVACID) 30 mg capsule TAKE 1 CAPSULE DAILY       (BEFORE BREAKFAST) 90 Cap 3  terazosin (HYTRIN) 5 mg capsule Take 1 Cap by mouth nightly. 90 Cap 3  
 gemfibrozil (LOPID) 600 mg tablet Take 1 Tab by mouth daily. 90 Tab 3  
 metFORMIN (GLUCOPHAGE) 500 mg tablet TAKE 1 TABLET BY MOUTH TWICE A DAY WITH FOOD 180 Tab 2  
 digoxin (LANOXIN) 0.125 mg tablet TAKE 1 TABLET DAILY 90 Tab 3  
 KLOR-CON M20 20 mEq tablet TAKE 1 TABLET DAILY 90 Tab 3  polyethylene glycol (MIRALAX) 17 gram/dose powder Take 17 g by mouth daily. 1 tablespoon with 8 oz of water daily (Patient taking differently: Take 17 g by mouth daily as needed. 1 tablespoon with 8 oz of water daily) 238 g 0  
 mirabegron ER (MYRBETRIQ) 25 mg ER tablet Take 1 Tab by mouth daily. 90 Tab 3  
 acetaminophen (TYLENOL EXTRA STRENGTH) 500 mg tablet Take  by mouth every six (6) hours as needed for Pain.  aspirin 81 mg tablet Take 1 Tab by mouth daily. 1 Tab 0  
 glucose blood VI test strips (ACCU-CHEK COMPACT TEST) strip by Does Not Apply route. Use as directed 1 Package 11 No Known Allergies Family History Problem Relation Age of Onset  Hypertension Mother  Heart Failure Father  at age 80 from CHF  Diabetes Sister  Diabetes Brother Social History Tobacco Use  Smoking status: Never Smoker  Smokeless tobacco: Never Used Substance Use Topics  Alcohol use: No  
 
 
Depression Risk Factor Screening:  
 
3 most recent PHQ Screens 2019 Little interest or pleasure in doing things Not at all Feeling down, depressed, irritable, or hopeless Not at all Total Score PHQ 2 0 Alcohol Risk Factor Screening: Do you average 1 drink per night or more than 7 drinks a week:  No 
 
On any one occasion in the past three months have you have had more than 3 drinks containing alcohol:  No 
 
 
Functional Ability and Level of Safety:  
Hearing: is diminished Activities of Daily Living: The home contains: uses a cane Patient does total self care Ambulation: with mild difficulty Fall Risk: 
Fall Risk Assessment, last 12 mths 8/6/2019 Able to walk? Yes Fall in past 12 months? No  
Fall with injury? -  
Number of falls in past 12 months - Fall Risk Score -  
 
 
Abuse Screen: 
Patient is not abused Cognitive Screening Has your family/caregiver stated any concerns about your memory: yes - gets forgetful at times. Cognitive Screening: Normal - by observation Patient Care Team  
Patient Care Team: 
Brittanie Underwood MD as PCP - Jamil Mcgrath MD as PCP - [facility] Adeola España MD (Podiatry) Brian Rodriguez MD (Cardiology) Bill Aguayo MD (Ophthalmology) Paulina Rod as Care Transitions Nurse Lab Results Component Value Date/Time Hemoglobin A1c 5.9 (H) 07/22/2019 05:04 PM  
 
General appearance: alert, cooperative, no distress, appears stated age Neck: supple, symmetrical, trachea midline, no adenopathy, thyroid: not enlarged, symmetric, no tenderness/mass/nodules, no carotid bruit and no JVD Lungs: clear to auscultation bilaterally Heart: regular rate and rhythm, S1, S2 normal, no murmur, click, rub or gallop Extremities: extremities normal, atraumatic, no cyanosis or edema Assessment/Plan Education and counseling provided: 
Are appropriate based on today's review and evaluation End-of-Life planning (with patient's consent) Diagnoses and all orders for this visit: 
 
1. Medicare annual wellness visit, subsequent 2. Gait instability -     AMB SUPPLY ORDER Health Maintenance Due Topic Date Due  Shingrix Vaccine Age 50> (1 of 2) 09/05/1967  Pneumococcal 65+ years (1 of 1 - PPSV23) 09/05/1982  Influenza Age 5 to Adult  08/01/2019 Stop HCTZ 
BP stable Follow-up and Dispositions · Return in about 5 months (around 4/18/2020) for htn. An After Visit Summary was printed and given to the patient. This has been fully explained to the patient, who indicates understanding.

## 2020-01-01 ENCOUNTER — VIRTUAL VISIT (OUTPATIENT)
Dept: FAMILY MEDICINE CLINIC | Age: 85
End: 2020-01-01

## 2020-01-01 ENCOUNTER — HOSPITAL ENCOUNTER (OUTPATIENT)
Dept: VASCULAR SURGERY | Age: 85
Discharge: HOME | End: 2020-07-24
Attending: NURSE PRACTITIONER
Payer: MEDICARE

## 2020-01-01 ENCOUNTER — HOSPITAL ENCOUNTER (OUTPATIENT)
Dept: CT IMAGING | Age: 85
Discharge: HOME | End: 2020-12-02
Attending: FAMILY MEDICINE
Payer: MEDICARE

## 2020-01-01 ENCOUNTER — TELEPHONE (OUTPATIENT)
Dept: FAMILY MEDICINE CLINIC | Age: 85
End: 2020-01-01

## 2020-01-01 ENCOUNTER — VIRTUAL VISIT (OUTPATIENT)
Dept: CARDIOLOGY CLINIC | Age: 85
End: 2020-01-01

## 2020-01-01 ENCOUNTER — OFFICE VISIT (OUTPATIENT)
Dept: FAMILY MEDICINE CLINIC | Age: 85
End: 2020-01-01

## 2020-01-01 ENCOUNTER — HOSPITAL ENCOUNTER (OUTPATIENT)
Dept: GENERAL RADIOLOGY | Age: 85
Discharge: HOME | End: 2020-07-23
Payer: MEDICARE

## 2020-01-01 ENCOUNTER — VIRTUAL VISIT (OUTPATIENT)
Dept: FAMILY MEDICINE CLINIC | Age: 85
End: 2020-01-01
Payer: MEDICARE

## 2020-01-01 ENCOUNTER — HOSPITAL ENCOUNTER (OUTPATIENT)
Dept: LAB | Age: 85
Discharge: HOME | End: 2020-08-19
Payer: MEDICARE

## 2020-01-01 DIAGNOSIS — I35.0 NONRHEUMATIC AORTIC VALVE STENOSIS: ICD-10-CM

## 2020-01-01 DIAGNOSIS — E78.00 HYPERCHOLESTEROLEMIA: ICD-10-CM

## 2020-01-01 DIAGNOSIS — R53.1 RIGHT SIDED WEAKNESS: ICD-10-CM

## 2020-01-01 DIAGNOSIS — E11.21 TYPE 2 DIABETES WITH NEPHROPATHY (HCC): ICD-10-CM

## 2020-01-01 DIAGNOSIS — I10 ESSENTIAL HYPERTENSION, BENIGN: ICD-10-CM

## 2020-01-01 DIAGNOSIS — I10 ESSENTIAL HYPERTENSION: ICD-10-CM

## 2020-01-01 DIAGNOSIS — R26.81 GAIT INSTABILITY: Primary | ICD-10-CM

## 2020-01-01 DIAGNOSIS — M25.571 ACUTE RIGHT ANKLE PAIN: ICD-10-CM

## 2020-01-01 DIAGNOSIS — M25.571 ACUTE RIGHT ANKLE PAIN: Primary | ICD-10-CM

## 2020-01-01 DIAGNOSIS — R60.9 PERIPHERAL EDEMA: Primary | ICD-10-CM

## 2020-01-01 DIAGNOSIS — Z00.00 MEDICARE ANNUAL WELLNESS VISIT, SUBSEQUENT: Primary | ICD-10-CM

## 2020-01-01 DIAGNOSIS — I48.0 PAF (PAROXYSMAL ATRIAL FIBRILLATION) (HCC): Primary | ICD-10-CM

## 2020-01-01 LAB
ALT SERPL-CCNC: 13 U/L (ref 13–56)
ANION GAP SERPL CALC-SCNC: 3 MMOL/L (ref 3–18)
AST SERPL-CCNC: 11 U/L (ref 10–38)
BUN SERPL-MCNC: 16 MG/DL (ref 7–18)
BUN/CREAT SERPL: 21 (ref 12–20)
CALCIUM SERPL-MCNC: 9.9 MG/DL (ref 8.5–10.1)
CHLORIDE SERPL-SCNC: 108 MMOL/L (ref 100–111)
CHOLEST SERPL-MCNC: 200 MG/DL
CO2 SERPL-SCNC: 31 MMOL/L (ref 21–32)
CREAT SERPL-MCNC: 0.78 MG/DL (ref 0.6–1.3)
EST. AVERAGE GLUCOSE BLD GHB EST-MCNC: 111 MG/DL
GLUCOSE SERPL-MCNC: 91 MG/DL (ref 74–99)
HBA1C MFR BLD: 5.5 % (ref 4.2–5.6)
HDLC SERPL-MCNC: 74 MG/DL (ref 40–60)
HDLC SERPL: 2.7 {RATIO} (ref 0–5)
LDLC SERPL CALC-MCNC: 116.2 MG/DL (ref 0–100)
LIPID PROFILE,FLP: ABNORMAL
POTASSIUM SERPL-SCNC: 4.3 MMOL/L (ref 3.5–5.5)
SODIUM SERPL-SCNC: 142 MMOL/L (ref 136–145)
TRIGL SERPL-MCNC: 49 MG/DL (ref ?–150)
VLDLC SERPL CALC-MCNC: 9.8 MG/DL

## 2020-01-01 PROCEDURE — 84460 ALANINE AMINO (ALT) (SGPT): CPT

## 2020-01-01 PROCEDURE — 84450 TRANSFERASE (AST) (SGOT): CPT

## 2020-01-01 PROCEDURE — 1101F PT FALLS ASSESS-DOCD LE1/YR: CPT | Performed by: FAMILY MEDICINE

## 2020-01-01 PROCEDURE — G0463 HOSPITAL OUTPT CLINIC VISIT: HCPCS | Performed by: FAMILY MEDICINE

## 2020-01-01 PROCEDURE — 36415 COLL VENOUS BLD VENIPUNCTURE: CPT

## 2020-01-01 PROCEDURE — G0439 PPPS, SUBSEQ VISIT: HCPCS | Performed by: FAMILY MEDICINE

## 2020-01-01 PROCEDURE — G8432 DEP SCR NOT DOC, RNG: HCPCS | Performed by: FAMILY MEDICINE

## 2020-01-01 PROCEDURE — 99212 OFFICE O/P EST SF 10 MIN: CPT | Performed by: FAMILY MEDICINE

## 2020-01-01 PROCEDURE — 70450 CT HEAD/BRAIN W/O DYE: CPT

## 2020-01-01 PROCEDURE — G8536 NO DOC ELDER MAL SCRN: HCPCS | Performed by: FAMILY MEDICINE

## 2020-01-01 PROCEDURE — G8427 DOCREV CUR MEDS BY ELIG CLIN: HCPCS | Performed by: FAMILY MEDICINE

## 2020-01-01 PROCEDURE — 80048 BASIC METABOLIC PNL TOTAL CA: CPT

## 2020-01-01 PROCEDURE — 73590 X-RAY EXAM OF LOWER LEG: CPT

## 2020-01-01 PROCEDURE — 83036 HEMOGLOBIN GLYCOSYLATED A1C: CPT

## 2020-01-01 PROCEDURE — G8421 BMI NOT CALCULATED: HCPCS | Performed by: FAMILY MEDICINE

## 2020-01-01 PROCEDURE — 93971 EXTREMITY STUDY: CPT

## 2020-01-01 PROCEDURE — 80061 LIPID PANEL: CPT

## 2020-01-01 PROCEDURE — 1090F PRES/ABSN URINE INCON ASSESS: CPT | Performed by: FAMILY MEDICINE

## 2020-01-01 RX ORDER — LANSOPRAZOLE 30 MG/1
CAPSULE, DELAYED RELEASE ORAL
Qty: 90 CAP | Refills: 3 | Status: SHIPPED | OUTPATIENT
Start: 2020-01-01

## 2020-01-01 RX ORDER — GEMFIBROZIL 600 MG/1
600 TABLET, FILM COATED ORAL DAILY
Qty: 90 TAB | Refills: 3 | Status: SHIPPED | OUTPATIENT
Start: 2020-01-01

## 2020-01-01 RX ORDER — METOPROLOL SUCCINATE 25 MG/1
25 TABLET, EXTENDED RELEASE ORAL DAILY
Qty: 90 TAB | Refills: 3 | Status: SHIPPED | OUTPATIENT
Start: 2020-01-01

## 2020-01-01 RX ORDER — METFORMIN HYDROCHLORIDE 500 MG/1
500 TABLET ORAL 2 TIMES DAILY WITH MEALS
Qty: 180 TAB | Refills: 2 | Status: CANCELLED | OUTPATIENT
Start: 2020-01-01

## 2020-01-01 RX ORDER — LISINOPRIL 20 MG/1
20 TABLET ORAL DAILY
Qty: 90 TAB | Refills: 3 | Status: SHIPPED | OUTPATIENT
Start: 2020-01-01

## 2020-01-01 RX ORDER — CHLORTHALIDONE 25 MG/1
25 TABLET ORAL DAILY
Qty: 90 TAB | Refills: 3 | OUTPATIENT
Start: 2020-01-01

## 2020-01-01 RX ORDER — TERAZOSIN 5 MG/1
5 CAPSULE ORAL
Qty: 90 CAP | Refills: 3 | Status: SHIPPED | OUTPATIENT
Start: 2020-01-01

## 2020-01-27 NOTE — TELEPHONE ENCOUNTER
Last Visit: 11/18/19 with MD Pinky Hall  Next Appointment: 4/15/20 with MD Pinky Hall  Previous Refill Encounter(s): 2/6/19 #90 with 3 refills    Requested Prescriptions     Pending Prescriptions Disp Refills    potassium chloride (KLOR-CON M20) 20 mEq tablet 90 Tab 3     Sig: Take 1 Tab by mouth daily.

## 2020-01-29 RX ORDER — POTASSIUM CHLORIDE 20 MEQ/1
20 TABLET, EXTENDED RELEASE ORAL DAILY
Qty: 90 TAB | Refills: 3 | Status: SHIPPED | OUTPATIENT
Start: 2020-01-29 | End: 2021-01-01

## 2020-03-11 ENCOUNTER — PATIENT OUTREACH (OUTPATIENT)
Dept: FAMILY MEDICINE CLINIC | Age: 85
End: 2020-03-11

## 2020-04-15 NOTE — TELEPHONE ENCOUNTER
Last Visit: 11/18/19 with MD Romelia Avitia  Next Appointment: 6/24/20 with MD Romelia Avitia  Previous Refill Encounter(s): 6/7/19 Lanoxin #90 with 3 refills, 7/22/19 Glucophage #180 with 2 refills    Requested Prescriptions     Pending Prescriptions Disp Refills    digoxin (LANOXIN) 0.125 mg tablet 90 Tab 3     Sig: TAKE 1 TABLET DAILY    metFORMIN (GLUCOPHAGE) 500 mg tablet 180 Tab 2     Sig: Take 1 Tab by mouth two (2) times daily (with meals).

## 2020-04-16 RX ORDER — METFORMIN HYDROCHLORIDE 500 MG/1
500 TABLET ORAL 2 TIMES DAILY WITH MEALS
Qty: 180 TAB | Refills: 2 | Status: SHIPPED | OUTPATIENT
Start: 2020-04-16 | End: 2021-01-01

## 2020-04-16 RX ORDER — DIGOXIN 125 MCG
TABLET ORAL
Qty: 90 TAB | Refills: 3 | Status: SHIPPED | OUTPATIENT
Start: 2020-04-16

## 2020-05-19 NOTE — PROGRESS NOTES
Mikey Hernandez  89 y. o. who was seen by synchronous (real-time) audio-video technology on 3/31/2020. Consent: 
She and/or her healthcare decision maker is aware that this patient-initiated Telehealth encounter is a billable service, with coverage as determined by her insurance carrier. She is aware that she may receive a bill and has provided verbal consent to proceed: YES I was at home while conducting this encounter. HPI: 
 
She looks very good for her age of 80. She appears to be alert. She has had some minor memory loss. She denies chest pain, dyspnea, orthopnea or PND. Her eyes get blurred but she does not have eye pain particularly in the right eye. She denies palpitation, dizziness or syncope. She has had no symptoms to indicate TIA or amaurosis fugax. She has been treated with antibiotics for a urinary tract infection recently. She has no fever currently. She has a history of hypertension and acute pulmonary edema in 1988 at which time she had normal cardiac catheterization and normal left ventricular systolic function with hyperdynamic contractions. There was evidence of left ventricular diastolic dysfunction at that time which was felt to be the etiology of her pulmonary edema.   
She had the atrial fibrillation for which she was anticoagulated with Coumadin for a while until April 2012 when she was seen in my office when she was found to be in sinus rhythm. Because of the fact that she was in sinus rhythm and her age was in the mid 80s, a decision was made to discontinue Coumadin altogether. She has since remained in sinus rhythm. 
  
 
Coding Help - Use CPT Codes 40125-21506, [id number] for Established and New Patients respectively, either employing EM elements or Time rules. Other codes (example consult codes) may also apply.  
I spent at least minutes 25 with this established patient, and >50% of the time was spent counseling and/or coordinating care regarding current and future cardiac care. [address] Current Outpatient Medications Medication Sig  digoxin (LANOXIN) 0.125 mg tablet TAKE 1 TABLET DAILY  metFORMIN (GLUCOPHAGE) 500 mg tablet Take 1 Tab by mouth two (2) times daily (with meals).  potassium chloride (KLOR-CON M20) 20 mEq tablet Take 1 Tab by mouth daily.  metoprolol succinate (TOPROL-XL) 25 mg XL tablet TAKE 1 TABLET BY MOUTH EVERY DAY  lisinopril (PRINIVIL, ZESTRIL) 20 mg tablet Take 1 Tab by mouth daily.  lansoprazole (PREVACID) 30 mg capsule TAKE 1 CAPSULE DAILY       (BEFORE BREAKFAST)  terazosin (HYTRIN) 5 mg capsule Take 1 Cap by mouth nightly.  gemfibrozil (LOPID) 600 mg tablet Take 1 Tab by mouth daily.  polyethylene glycol (MIRALAX) 17 gram/dose powder Take 17 g by mouth daily. 1 tablespoon with 8 oz of water daily (Patient taking differently: Take 17 g by mouth daily as needed. 1 tablespoon with 8 oz of water daily)  mirabegron ER (MYRBETRIQ) 25 mg ER tablet Take 1 Tab by mouth daily.  acetaminophen (TYLENOL EXTRA STRENGTH) 500 mg tablet Take  by mouth every six (6) hours as needed for Pain.  aspirin 81 mg tablet Take 1 Tab by mouth daily.  glucose blood VI test strips (ACCU-CHEK COMPACT TEST) strip by Does Not Apply route. Use as directed No current facility-administered medications for this visit. No Known Allergies Review of Systems Constitutional: Negative for malaise/fatigue and weight loss. HENT: Positive for hearing loss. Eyes: Positive for blurred vision. Negative for double vision and pain. Respiratory: Negative for shortness of breath. Cardiovascular: Negative for chest pain, palpitations, orthopnea, claudication, leg swelling and PND. Gastrointestinal: Negative for blood in stool, heartburn and melena. Genitourinary: Positive for frequency and urgency. Negative for dysuria and hematuria. Musculoskeletal: Negative for back pain and joint pain. Skin: Negative for itching and rash. Neurological: Negative for dizziness and loss of consciousness. Psychiatric/Behavioral: Positive for memory loss. Negative for depression. Vital Signs: (As obtained by patient/caregiver at home) There were no vitals taken for this visit. Assessment & Plan: ICD-10-CM ICD-9-CM 1. PAF (paroxysmal atrial fibrillation) ([facility]) I48.0 427.31   
2. Nonrheumatic aortic valve stenosis,mild I35.0 424.1 3. Essential hypertension I10 401.9 DISCUSSION: 
She has been doing quite well. She looks fairly good for her age of [address]. She has had no symptoms to indicate angina or cardiac decompensation. She has had no recurrent palpitations that would indicate atrial fibrillation. She is currently not taking diuretics yet. She still takes potassium and was advised to stop taking the potassium when she is not taking diuretics. Her son was advised to take her to her primary care physician for lab work for a potassium level. There appears to be no symptoms to indicate frequent atrial fibrillation. Based on no symptoms, it appears that her aortic stenosis remains to be mild. Her future cardiac care will be carried over to Dr. Jeffery Craft in June 2020 upon my intermediate. We discussed the expected course, resolution and complications of the diagnosis(es) in detail. Medication risks, benefits, costs, interactions, and alternatives were discussed as indicated. I advised her to contact the office if her condition worsens, changes or fails to improve as anticipated. She expressed understanding with the diagnosis(es) and plan.   
 
Pursuant to the emergency declaration under the [address] [place] [address] and the [employer] Act, this Virtual  Visit was conducted, with patient's consent, to reduce the patient's risk of exposure to COVID-19 and provide continuity of care for an established patient. Services were provided through a video synchronous discussion virtually to substitute for in-person clinic visit.  
 
Alec Marrero MD

## 2020-07-20 NOTE — TELEPHONE ENCOUNTER
Last Visit: 11/18/2019 with MD Derick Manjarrez  Next Appointment: noted to f/u in 5 months  Previous Refill Encounter(s): 07/22/2019 per MD Derick Manjarrez #90 3R    Requested Prescriptions     Pending Prescriptions Disp Refills    terazosin (HYTRIN) 5 mg capsule 90 Cap 3     Sig: Take 1 Cap by mouth nightly.  metoprolol succinate (TOPROL-XL) 25 mg XL tablet 90 Tab 3     Sig: Take 1 Tab by mouth daily.

## 2020-07-20 NOTE — TELEPHONE ENCOUNTER
Spoke with patient's son on Friday, wanted to verify if appt was still set for 8/26 and if ok to bring into office, did adv of provider schedule and that could change to sooner appt but suggested vv de to lynn and age, son wanted to make sure ok to change and agreed that vv using his phone would be ok. Verified [facility] provider will be out and attempted call pt son to verify and change appt, no answer left mssg to call office. Please adv if returns call.

## 2020-07-21 NOTE — TELEPHONE ENCOUNTER
Wednesday 1pm -5pm is Dr. Preethi Gomez in office hours per protocol.  Patient will need to be changed to virtual

## 2020-07-22 NOTE — PROGRESS NOTES
Vini Garcia is a 80 y.o. female who was seen by synchronous (real-time) audio-video technology on 7/22/2020 for Ankle Pain and Ankle swelling    Ms. Zuleta presents today for right lower leg/ankle swelling that she has has for a total of 1 week. She also noticed that there was a \"knot\" located on the lateral aspect of her lower leg. She denies any recent changes to medications, injuries, or trauma to area. Pt's son Martín Mancilla did say that he was not sure if it was and insect bite but pt does not complain of any itching. Pt does however, state that the pain is sharp in nature and that it is intermittent. Ms. Maddy Isabel says she has tried Tylenol extra strength but it has not helped the pain. Ms. Maddy Isabel also complained of SOB at rest and on exertion that she says is new for her. No Cp, headache, or dizziness noted. Assessment & Plan:   Diagnoses and all orders for this visit:    1. Acute right ankle pain  -     XR TIB/FIB RT; Future  -     DUPLEX LOWER EXT VENOUS RIGHT; Future      712  Subjective:       Prior to Admission medications    Medication Sig Start Date End Date Taking? Authorizing Provider   terazosin (HYTRIN) 5 mg capsule Take 1 Cap by mouth nightly. 7/21/20  Yes Jillene Lombard, MD   metoprolol succinate (TOPROL-XL) 25 mg XL tablet Take 1 Tab by mouth daily. 7/21/20  Yes Jillene Lombard, MD   digoxin (LANOXIN) 0.125 mg tablet TAKE 1 TABLET DAILY 4/16/20  Yes Jillene Lombard, MD   metFORMIN (GLUCOPHAGE) 500 mg tablet Take 1 Tab by mouth two (2) times daily (with meals). 4/16/20  Yes Jillene Lombard, MD   potassium chloride (KLOR-CON M20) 20 mEq tablet Take 1 Tab by mouth daily. 1/29/20  Yes Jillene Lombard, MD   lisinopril (PRINIVIL, ZESTRIL) 20 mg tablet Take 1 Tab by mouth daily.  7/22/19  Yes Jillene Lombard, MD   lansoprazole (PREVACID) 30 mg capsule TAKE 1 CAPSULE DAILY       (BEFORE BREAKFAST) 7/22/19  Yes Jillene Lombard, MD   gemfibrozil (LOPID) 600 mg tablet Take 1 Tab by mouth daily. 7/22/19  Yes Claude Shells, MD   polyethylene glycol (MIRALAX) 17 gram/dose powder Take 17 g by mouth daily. 1 tablespoon with 8 oz of water daily  Patient taking differently: Take 17 g by mouth daily as needed. 1 tablespoon with 8 oz of water daily 11/4/18  Yes Radha Shook MD   acetaminophen (TYLENOL EXTRA STRENGTH) 500 mg tablet Take  by mouth every six (6) hours as needed for Pain. Yes Provider, Historical   aspirin 81 mg tablet Take 1 Tab by mouth daily. 4/10/12  Yes Prateek Elias MD   glucose blood VI test strips (ACCU-CHEK COMPACT TEST) strip by Does Not Apply route. Use as directed 9/27/10  Yes Claude Shells, MD   mirabegron ER (MYRBETRIQ) 25 mg ER tablet Take 1 Tab by mouth daily. 8/28/18   Claude Shells, MD     Patient Active Problem List   Diagnosis Code    Essential hypertension, benign I10    Hypercholesterolemia E78.00    GERD (gastroesophageal reflux disease) K21.9    Diabetes ([address] E11.9    Atrial fibrillation ([address] I48.91    Paroxysmal atrial fibrillation ([address] I48.0    Aortic valve disorder I35.9    Pain of left hip joint M25.552    Peripheral edema R60.9    Type 2 diabetes with nephropathy (HCC) E11.21     Current Outpatient Medications   Medication Sig Dispense Refill    terazosin (HYTRIN) 5 mg capsule Take 1 Cap by mouth nightly. 90 Cap 3    metoprolol succinate (TOPROL-XL) 25 mg XL tablet Take 1 Tab by mouth daily. 90 Tab 3    digoxin (LANOXIN) 0.125 mg tablet TAKE 1 TABLET DAILY 90 Tab 3    metFORMIN (GLUCOPHAGE) 500 mg tablet Take 1 Tab by mouth two (2) times daily (with meals). 180 Tab 2    potassium chloride (KLOR-CON M20) 20 mEq tablet Take 1 Tab by mouth daily. 90 Tab 3    lisinopril (PRINIVIL, ZESTRIL) 20 mg tablet Take 1 Tab by mouth daily. 90 Tab 3    lansoprazole (PREVACID) 30 mg capsule TAKE 1 CAPSULE DAILY       (BEFORE BREAKFAST) 90 Cap 3    gemfibrozil (LOPID) 600 mg tablet Take 1 Tab by mouth daily.  90 Tab 3    polyethylene glycol (MIRALAX) 17 gram/dose powder Take 17 g by mouth daily. 1 tablespoon with 8 oz of water daily (Patient taking differently: Take 17 g by mouth daily as needed. 1 tablespoon with 8 oz of water daily) 238 g 0    acetaminophen (TYLENOL EXTRA STRENGTH) 500 mg tablet Take  by mouth every six (6) hours as needed for Pain.  aspirin 81 mg tablet Take 1 Tab by mouth daily. 1 Tab 0    glucose blood VI test strips (ACCU-CHEK COMPACT TEST) strip by Does Not Apply route. Use as directed 1 Package 11    mirabegron ER (MYRBETRIQ) 25 mg ER tablet Take 1 Tab by mouth daily. 90 Tab 3     No Known Allergies  Past Medical History:   Diagnosis Date    Cardiac echocardiogram 03/21/2008    EF 60-65%. No RWMA. RVSP 35 mmHg. Mild AI. Mild MR. Mild TR. Mild PI.  Cardiac nuclear imaging test 03/29/2004    A-fib. No evidence of ischemia or prior infarction. EF 59%. No WMA.  Cardiovascular lower extremity venous duplex 10/05/2011    No deep vein or superficial thrombosis bilaterally. Bilateral calf veins w/calcific changes.  Diabetes ([address] 04/2006    Essential hypertension, benign     GERD (gastroesophageal reflux disease)     Hypercholesterolemia     Hypertension     Long term (current) use of anticoagulants 3/14/2011    Paroxysmal atrial fibrillation (HCC)     S/P total hysterectomy late 1960's       Review of Systems   Musculoskeletal:        Right lower extremity pain, redness and swelling. Objective:   No flowsheet data found. General: alert, cooperative, no distress   Mental  status: normal mood, behavior, speech, dress, motor activity, and thought processes, able to follow commands   HENT: NCAT   Neck: no visualized mass   Resp: no respiratory distress   Neuro: no gross deficits   Skin: no discoloration or lesions of concern on visible areas   Psychiatric: normal affect, consistent with stated mood, no evidence of hallucinations     Additional exam findings:        We discussed the expected course, resolution and complications of the diagnosis(es) in detail. Medication risks, benefits, costs, interactions, and alternatives were discussed as indicated. I advised her to contact the office if her condition worsens, changes or fails to improve as anticipated. She expressed understanding with the diagnosis(es) and plan. Jihan Nathan, who was evaluated through a patient-initiated, synchronous (real-time) audio-video encounter, and/or her healthcare decision maker, is aware that it is a billable service, with coverage as determined by her insurance carrier. She provided verbal consent to proceed: Yes, and patient identification was verified. It was conducted pursuant to the emergency declaration under the [facility], [address] [place] [address] authority and the [employer] Act. A caregiver was present when appropriate. Ability to conduct physical exam was limited. I was in the office. The patient was at home.       Sarah Rodríguez NP

## 2020-07-22 NOTE — Clinical Note
Nargis can you please call to schedule the U/S for MsDiogenes Song. She is on her way to [facility] for an xray now. She if she can be seen today please.

## 2020-07-24 NOTE — TELEPHONE ENCOUNTER
Called Pt's son to make aware Pt is scheduled at [facility] today & needs to arrive @ 1:45pm for the [address] [address]. Will need to enter at [facility] bring insurance card & ID. Son aware.

## 2020-08-05 NOTE — TELEPHONE ENCOUNTER
Patients son, called in [facility]  She is still having swelling pain and discoloring in ankle. Was told to call back if continued. Had a cat ramos and xray and both where negative. best contact number  [phone]  Bridget Rose called in.

## 2020-08-19 NOTE — PROGRESS NOTES
HPI:  Carlos Manuel Leslie is a 80 y.o. female who presents today with   Chief Complaint   Patient presents with    Peripheral Edema    Hypertension    Cholesterol Problem    High Blood Sugar        Pt states that her swelling in her right foot is worse than the left now  She was taken off her diuretic last year due to having to urinate more than she liked to  She has a cystocoele and has a pessary in place; She is followed by gyn. 3 most recent PHQ Screens 8/19/2020   Little interest or pleasure in doing things Not at all   Feeling down, depressed, irritable, or hopeless Not at all   Total Score PHQ 2 0               PMH,  Meds, Allergies, Family History, Social history reviewed      Current Outpatient Medications   Medication Sig Dispense Refill    lisinopriL (PRINIVIL, ZESTRIL) 20 mg tablet Take 1 Tab by mouth daily. 90 Tab 3    lansoprazole (PREVACID) 30 mg capsule TAKE 1 CAPSULE DAILY       (BEFORE BREAKFAST) 90 Cap 3    gemfibroziL (LOPID) 600 mg tablet Take 1 Tab by mouth daily. 90 Tab 3    terazosin (HYTRIN) 5 mg capsule Take 1 Cap by mouth nightly. 90 Cap 3    metoprolol succinate (TOPROL-XL) 25 mg XL tablet Take 1 Tab by mouth daily. 90 Tab 3    digoxin (LANOXIN) 0.125 mg tablet TAKE 1 TABLET DAILY 90 Tab 3    metFORMIN (GLUCOPHAGE) 500 mg tablet Take 1 Tab by mouth two (2) times daily (with meals). 180 Tab 2    potassium chloride (KLOR-CON M20) 20 mEq tablet Take 1 Tab by mouth daily. 90 Tab 3    acetaminophen (TYLENOL EXTRA STRENGTH) 500 mg tablet Take  by mouth every six (6) hours as needed for Pain.  aspirin 81 mg tablet Take 1 Tab by mouth daily. 1 Tab 0    glucose blood VI test strips (ACCU-CHEK COMPACT TEST) strip by Does Not Apply route. Use as directed 1 Package 11        No Known Allergies               Review of Systems   Constitutional: Negative for chills and fever. Respiratory: Negative for shortness of breath and wheezing.     Cardiovascular: Negative for chest pain and palpitations. All other systems reviewed and are negative. There were no vitals taken for this visit. Physical Exam   There were no vitals taken for this visit. General appearance: alert, cooperative, no distress, appears stated age  Neck: supple, symmetrical, trachea midline, no adenopathy, thyroid: not enlarged, symmetric, no tenderness/mass/nodules, no carotid bruit and no JVD  Lungs: clear to auscultation bilaterally  Heart: regular rate and rhythm, S1, S2 normal, no murmur, click, rub or gallop    Extremities: mild pedal edema noted;     Assessment/Plan:  Diagnoses and all orders for this visit:    1. Peripheral edema    2. Type 2 diabetes with nephropathy (HCC)  -     HEMOGLOBIN A1C WITH EAG; Future    3. Essential hypertension, benign  -     METABOLIC PANEL, BASIC; Future    4. Hypercholesterolemia  -     LIPID PANEL; Future  -     AST; Future  -     ALT; Future    Other orders  -     lisinopriL (PRINIVIL, ZESTRIL) 20 mg tablet; Take 1 Tab by mouth daily. -     lansoprazole (PREVACID) 30 mg capsule; TAKE 1 CAPSULE DAILY       (BEFORE BREAKFAST)  -     gemfibroziL (LOPID) 600 mg tablet; Take 1 Tab by mouth daily. As above  Decrease salt load  Keep feet elevated  Stay hydrated    Follow-up and Dispositions    · Return in about 3 months (around 11/19/2020) for medicare well.             Latoya Jalloh MD

## 2020-08-19 NOTE — PROGRESS NOTES
Jessica Villegas is a  80 y.o. female presents today for office visit for bilateral edema in both feet x 3 months. 1. Have you been to the ER, urgent care clinic or hospitalized since your last visit? NO 
 
2. Have you seen or consulted any other health care providers outside of the [address] [place] [address] since your last visit (Include any pap smears or colon screening)? NO

## 2020-08-19 NOTE — PATIENT INSTRUCTIONS
DASH Diet: Care Instructions  Your Care Instructions     The DASH diet is an eating plan that can help lower your blood pressure. DASH stands for Dietary Approaches to Stop Hypertension. Hypertension is high blood pressure. The DASH diet focuses on eating foods that are high in calcium, potassium, and magnesium. These nutrients can lower blood pressure. The foods that are highest in these nutrients are fruits, vegetables, low-fat dairy products, nuts, seeds, and legumes. But taking calcium, potassium, and magnesium supplements instead of eating foods that are high in those nutrients does not have the same effect. The DASH diet also includes whole grains, fish, and poultry. The DASH diet is one of several lifestyle changes your doctor may recommend to lower your high blood pressure. Your doctor may also want you to decrease the amount of sodium in your diet. Lowering sodium while following the DASH diet can lower blood pressure even further than just the DASH diet alone. Follow-up care is a key part of your treatment and safety. Be sure to make and go to all appointments, and call your doctor if you are having problems. It's also a good idea to know your test results and keep a list of the medicines you take. How can you care for yourself at home? Following the DASH diet  · Eat 4 to 5 servings of fruit each day. A serving is 1 medium-sized piece of fruit, ½ cup chopped or canned fruit, 1/4 cup dried fruit, or 4 ounces (½ cup) of fruit juice. Choose fruit more often than fruit juice. · Eat 4 to 5 servings of vegetables each day. A serving is 1 cup of lettuce or raw leafy vegetables, ½ cup of chopped or cooked vegetables, or 4 ounces (½ cup) of vegetable juice. Choose vegetables more often than vegetable juice. · Get 2 to 3 servings of low-fat and fat-free dairy each day. A serving is 8 ounces of milk, 1 cup of yogurt, or 1 ½ ounces of cheese. · Eat 6 to 8 servings of grains each day.  A serving is 1 slice of bread, 1 ounce of dry cereal, or ½ cup of cooked rice, pasta, or cooked cereal. Try to choose whole-grain products as much as possible. · Limit lean meat, poultry, and fish to 2 servings each day. A serving is 3 ounces, about the size of a deck of cards. · Eat 4 to 5 servings of nuts, seeds, and legumes (cooked dried beans, lentils, and split peas) each week. A serving is 1/3 cup of nuts, 2 tablespoons of seeds, or ½ cup of cooked beans or peas. · Limit fats and oils to 2 to 3 servings each day. A serving is 1 teaspoon of vegetable oil or 2 tablespoons of salad dressing. · Limit sweets and added sugars to 5 servings or less a week. A serving is 1 tablespoon jelly or jam, ½ cup sorbet, or 1 cup of lemonade. · Eat less than 2,300 milligrams (mg) of sodium a day. If you limit your sodium to 1,500 mg a day, you can lower your blood pressure even more. Tips for success  · Start small. Do not try to make dramatic changes to your diet all at once. You might feel that you are missing out on your favorite foods and then be more likely to not follow the plan. Make small changes, and stick with them. Once those changes become habit, add a few more changes. · Try some of the following:  ? Make it a goal to eat a fruit or vegetable at every meal and at snacks. This will make it easy to get the recommended amount of fruits and vegetables each day. ? Try yogurt topped with fruit and nuts for a snack or healthy dessert. ? Add lettuce, tomato, cucumber, and onion to sandwiches. ? Combine a ready-made pizza crust with low-fat mozzarella cheese and lots of vegetable toppings. Try using tomatoes, squash, spinach, broccoli, carrots, cauliflower, and onions. ? Have a variety of cut-up vegetables with a low-fat dip as an appetizer instead of chips and dip. ? Sprinkle sunflower seeds or chopped almonds over salads. Or try adding chopped walnuts or almonds to cooked vegetables.   ? Try some vegetarian meals using beans and peas. Add garbanzo or kidney beans to salads. Make burritos and tacos with mashed segovia beans or black beans. Where can you learn more? Go to http://juju-karol.info/  Enter H967 in the search box to learn more about \"DASH Diet: Care Instructions. \"  Current as of: December 16, 2019               Content Version: 12.5  © 7564-1318 [employer]. Care instructions adapted under license by [employer] (which disclaims liability or warranty for this information). If you have questions about a medical condition or this instruction, always ask your healthcare professional. [address] any warranty or liability for your use of this information. Low Sodium Diet (2,000 Milligram): Care Instructions  Your Care Instructions     Too much sodium causes your body to hold on to extra water. This can raise your blood pressure and force your heart and kidneys to work harder. In very serious cases, this could cause you to be put in the hospital. It might even be life-threatening. By limiting sodium, you will feel better and lower your risk of serious problems. The most common source of sodium is salt. People get most of the salt in their diet from canned, prepared, and packaged foods. Fast food and restaurant meals also are very high in sodium. Your doctor will probably limit your sodium to less than 2,000 milligrams (mg) a day. This limit counts all the sodium in prepared and packaged foods and any salt you add to your food. Follow-up care is a key part of your treatment and safety. Be sure to make and go to all appointments, and call your doctor if you are having problems. It's also a good idea to know your test results and keep a list of the medicines you take. How can you care for yourself at home? Read food labels  · Read labels on cans and food packages. The labels tell you how much sodium is in each serving.  Make sure that you look at the serving size. If you eat more than the serving size, you have eaten more sodium. · Food labels also tell you the Percent Daily Value for sodium. Choose products with low Percent Daily Values for sodium. · Be aware that sodium can come in forms other than salt, including monosodium glutamate (MSG), sodium citrate, and sodium bicarbonate (baking soda). MSG is often added to Asian food. When you eat out, you can sometimes ask for food without MSG or added salt. Buy low-sodium foods  · Buy foods that are labeled \"unsalted\" (no salt added), \"sodium-free\" (less than 5 mg of sodium per serving), or \"low-sodium\" (less than 140 mg of sodium per serving). Foods labeled \"reduced-sodium\" and \"light sodium\" may still have too much sodium. Be sure to read the label to see how much sodium you are getting. · Buy fresh vegetables, or frozen vegetables without added sauces. Buy low-sodium versions of canned vegetables, soups, and other canned goods. Prepare low-sodium meals  · Cut back on the amount of salt you use in cooking. This will help you adjust to the taste. Do not add salt after cooking. One teaspoon of salt has about 2,300 mg of sodium. · Take the salt shaker off the table. · Flavor your food with garlic, lemon juice, onion, vinegar, herbs, and spices. Do not use soy sauce, lite soy sauce, steak sauce, onion salt, garlic salt, celery salt, mustard, or ketchup on your food. · Use low-sodium salad dressings, sauces, and ketchup. Or make your own salad dressings and sauces without adding salt. · Use less salt (or none) when recipes call for it. You can often use half the salt a recipe calls for without losing flavor. Other foods such as rice, pasta, and grains do not need added salt. · Rinse canned vegetables, and cook them in fresh water. This removes some--but not all--of the salt. · Avoid water that is naturally high in sodium or that has been treated with water softeners, which add sodium.  Call your local water company to find out the sodium content of your water supply. If you buy bottled water, read the label and choose a sodium-free brand. Avoid high-sodium foods  · Avoid eating:  ? Smoked, cured, salted, and canned meat, fish, and poultry. ? Ham, crowley, hot dogs, and luncheon meats. ? Regular, hard, and processed cheese and regular peanut butter. ? Crackers with salted tops, and other salted snack foods such as pretzels, chips, and salted popcorn. ? Frozen prepared meals, unless labeled low-sodium. ? Canned and dried soups, broths, and bouillon, unless labeled sodium-free or low-sodium. ? Canned vegetables, unless labeled sodium-free or low-sodium. ? Western Karen fries, pizza, tacos, and other fast foods. ? Pickles, olives, ketchup, and other condiments, especially soy sauce, unless labeled sodium-free or low-sodium. Where can you learn more? Go to http://juju-karol.info/  Enter V843 in the search box to learn more about \"Low Sodium Diet (2,000 Milligram): Care Instructions. \"  Current as of: August 22, 2019               Content Version: 12.5  © 8819-4965 [employer]. Care instructions adapted under license by [employer] (which disclaims liability or warranty for this information). If you have questions about a medical condition or this instruction, always ask your healthcare professional. [place] [address] any warranty or liability for your use of this information.

## 2020-09-17 NOTE — TELEPHONE ENCOUNTER
Pt son states he was told to ask a lightweight, transport chair instead of a regular wheelchair for his mother. Son is aware we will send the order to [facility] & they will contact him.

## 2020-09-17 NOTE — TELEPHONE ENCOUNTER
Orderd transport travel [facility] & awaiting Dr Radha Ngo to sign.

## 2020-09-17 NOTE — TELEPHONE ENCOUNTER
Patient's son stated his mother needs the order for manual wheel chair changed to smaller transport type wheel chair. Patient's son stated she declined it in Nov 2019 but she really needs it now.

## 2020-11-13 NOTE — PROGRESS NOTES
AMBULATORY PROGRESS NOTE      Patient: Adri Barajas             MRN: [id number]     SSN: [id number] Body mass index is 24.3 kg/(m^2). YOB: 1917     AGE: 80 y.o. EX: female    PCP: Denys Mahoney MD    IMPRESSION/DIAGNOSIS AND TREATMENT PLAN     DIAGNOSES  1. Right forearm cellulitis        No orders of the defined types were placed in this encounter. Adri Barajas understands her diagnoses and the proposed plan. Plan:    1)  Finish Keflex 500 mg  2)  Continue activity as tolerated    RTO - 2 weeks    HPI AND EXAMINATION     Masha Zuleta IS A 80 y.o. female who presents to my outpatient office for follow up of hand and wrist pain. At last visit, I marked an area of redness and inflammation, and prescribed Keflex 500 mg. The patent notes that she has been doing well since the last visit. Ms. Kristy Mir notes that she has some pain when extending her fifth digit or extending her wrist. The swelling on her right wrist has subsided, and there is no red hue to her right wrist any longer. Redness did not cross the marked margin. She still has to finish her Keflex prescription. The patient reports that she fell before her previous visit where she felt her right knee gave out on her. Knees:  Right        Gait: slow         Cutaneous: Skin intact, no abrasions, blisters, wounds, erythema        Effusion: Is not present        Crepitus:  no PF joint crepitus       Tenderness:None    Alignment of Knee: neutral when standing        ROM: full range of motion        Fullness or swelling: None to popliteal fossa region        Stability: No instability to anterior, posterior, varus, valgus stress testing        Trust: No varus trust with gait        Contractures: No Achilles or Gastrocnemius Contractures.          Calf tenderness: Absent for calf or gastrocnemius muscle regions            Soft, supple, non tender, non taut lower extremity compartments  Extremities:   No embolic phenomena to the toes          No significant edema to the foot and or toes. Edema is not present to distal 1/3 tib/fib or ankle regions. Lower extremities are warm and appear well perfused    DVT: No evidence of DVT seen on examination at this time     No calf swelling, no tenderness to calf muscles  Lymphatic:  No Evidence of Lymphedema  Vascular: Medial Border of Tibia Region: Edema is not present        Pulses: Dorsalis Pedis &  Posterior Tibial Pulses : Palpable yes        Varicosities Lower Limbs : Mild noted . 1+ Pitting Edema to BLE  Neuro: Negative bilateral Straight leg raise (seated position)    See Musculoskeletal section for pertinent individual extremity examination    No abnormal hand/wrist, foot/ankle, or facial/neck tremors. Extensor mechanism is intact    Cardiovascular/Peripheral Vascular: Normal Pulses to each hand and foot  Musculoskeletal:  LOCATION:   right hand     HAND, WRIST, FOREARM:  right    Integumentary: No rashes, skin patches, wounds, blisters, or abrasions                         Warm and normal color. No regions of expressible drainage       Deformities:  Is not present       Swelling: Regions of abnormal swelling : Right Wrist swelling has decreased a lot       Tenderness: There is regions of tenderness : Slight. .. Along the right wrist that can extend into the forearm.        Motor/Strength/Tone Exam: normal 5/5 strength in all tested muscle groups       Sensory Exam:  no sensory deficits noted       Stability Testing: NONE       ROM: full range of motion        Contractures:  None to affected region         Vascular : Normal capillary refill and digital coloration                        No embolic phenomena to the toes or hands                        Edema is not present         Neuro Exam:  Sensation : no focal                                                      Motor function: WNL    CHART REVIEW     Past Medical History:   Diagnosis Date    Cardiac echocardiogram 03/21/2008    EF 60-65%. No RWMA. RVSP 35 mmHg. Mild AI. Mild MR. Mild TR. Mild PI.  Cardiac nuclear imaging test 03/29/2004    A-fib. No evidence of ischemia or prior infarction. EF 59%. No WMA.  Cardiovascular lower extremity venous duplex 10/05/2011    No deep vein or superficial thrombosis bilaterally. Bilateral calf veins w/calcific changes.  Diabetes ([facility] [address] 04/2006    Essential hypertension, benign     GERD (gastroesophageal reflux disease)     Hypercholesterolemia     Hypertension     Long term (current) use of anticoagulants 3/14/2011    Paroxysmal atrial fibrillation (HCC)     S/P total hysterectomy late 1960's     Current Outpatient Prescriptions   Medication Sig    acetaminophen-codeine (TYLENOL-CODEINE #3) 300-30 mg per tablet Take 1 Tab by mouth every four (4) hours as needed for Pain. Max Daily Amount: 6 Tabs.  ibuprofen (MOTRIN) 400 mg tablet Take 1 Tab by mouth every six (6) hours as needed for Pain.  cephALEXin (KEFLEX) 500 mg capsule Take 1 Cap by mouth four (4) times daily.  lansoprazole (PREVACID) 30 mg capsule Take 1 Cap by mouth Daily (before breakfast).  terazosin (HYTRIN) 5 mg capsule Take 1 Cap by mouth nightly.  digoxin (LANOXIN) 0.125 mg tablet Take 1 Tab by mouth daily.  chlorthalidone (HYGROTEN) 25 mg tablet Take 1 Tab by mouth daily.  gemfibrozil (LOPID) 600 mg tablet Take 1 Tab by mouth daily.  ACETAMINOPHEN (TYLENOL ARTHRITIS PAIN PO) Take  by mouth.  metoprolol succinate (TOPROL-XL) 25 mg XL tablet Take 1 Tab by mouth daily.  metFORMIN (GLUCOPHAGE) 500 mg tablet Take 1 Tab by mouth two (2) times daily (with meals).  mirabegron ER (MYRBETRIQ) 25 mg ER tablet Take 1 Tab by mouth daily.  potassium chloride (K-DUR, KLOR-CON) 20 mEq tablet Take 1 Tab by mouth daily.  lisinopril (PRINIVIL, ZESTRIL) 20 mg tablet Take 1 Tab by mouth daily.  aspirin 81 mg tablet Take 1 Tab by mouth daily.     glucose blood VI test strips (ACCU-CHEK COMPACT TEST) strip by Does Not Apply route. Use as directed     No current facility-administered medications for this visit. No Known Allergies  Past Surgical History:   Procedure Laterality Date    HX CATARACT REMOVAL      bilateral    HX HYSTERECTOMY  late      Social History     Occupational History    Not on file. Social History Main Topics    Smoking status: Never Smoker    Smokeless tobacco: Never Used    Alcohol use No    Drug use: No    Sexual activity: Not on file     Family History   Problem Relation Age of Onset    Hypertension Mother     Heart Failure Father       at age 80 from CHF    Diabetes Sister     Diabetes Brother        REVIEW OF SYSTEMS : 2017  ALL BELOW ARE Negative except : SEE HPI       Constitutional: Negative for fever, chills and weight loss. Neg Weigh Loss  Cardiovascular: Negative for chest pain, claudication and leg swelling. SOB, CHAPA   Gastrointestinal: Negative for  pain, N/V/D/C, Blood in stool or urine,dysuria, hematuria,        Incontinence, pelvic pain  Musculoskeletal: see HPI. Neurological: Negative for dizziness and weakness. Negative for headaches,Visual Changes, Confusion, Seizures,   Psychiatric/Behavioral: Negative for depression, memory loss and substance abuse. Extremities:  Negative for  hair changes, rash or skin lesion changes. Hematologic: Negative for Bleeding problems, bruising, pallor or swollen lymph nodes. Peripheral Vascular: No calf pain, vascular vein tenderness to calf pain              No calf throbbing, posterior knee throbbing pain    DIAGNOSTIC IMAGING     No notes on file    Written by Liu Ross, as dictated by Aydee Gold MD. IDr., Aydee Gold MD, confirm that all documentation is accurate.

## 2020-11-19 NOTE — PATIENT INSTRUCTIONS
Medicare Wellness Visit, Female The best way to live healthy is to have a lifestyle where you eat a well-balanced diet, exercise regularly, limit alcohol use, and quit all forms of tobacco/nicotine, if applicable. Regular preventive services are another way to keep healthy. Preventive services (vaccines, screening tests, monitoring & exams) can help personalize your care plan, which helps you manage your own care. Screening tests can find health problems at the earliest stages, when they are easiest to treat. [facility] follows the current, evidence-based guidelines published by the [facility] Jeffery Damon ([facility]) when recommending preventive services for our patients. Because we follow these guidelines, sometimes recommendations change over time as research supports it. (For example, mammograms used to be recommended annually. Even though Medicare will still pay for an annual mammogram, the newer guidelines recommend a mammogram every two years for women of average risk). Of course, you and your doctor may decide to screen more often for some diseases, based on your risk and your co-morbidities (chronic disease you are already diagnosed with). Preventive services for you include: - Medicare offers their members a free annual wellness visit, which is time for you and your primary care provider to discuss and plan for your preventive service needs. Take advantage of this benefit every year! 
-All adults over the age of 72 should receive the recommended pneumonia vaccines. Current USPSTF guidelines recommend a series of two vaccines for the best pneumonia protection.  
-All adults should have a flu vaccine yearly and a tetanus vaccine every 10 years.  
-All adults age 48 and older should receive the shingles vaccines (series of two vaccines). -All adults age 38-68 who are overweight should have a diabetes screening test once every three years. -All adults born between [birth year] and 1965 should be screened once for Hepatitis C. 
-Other screening tests and preventive services for persons with diabetes include: an eye exam to screen for diabetic retinopathy, a kidney function test, a foot exam, and stricter control over your cholesterol.  
-Cardiovascular screening for adults with routine risk involves an electrocardiogram (ECG) at intervals determined by your doctor.  
-Colorectal cancer screenings should be done for adults age 54-65 with no increased risk factors for colorectal cancer. There are a number of acceptable methods of screening for this type of cancer. Each test has its own benefits and drawbacks. Discuss with your doctor what is most appropriate for you during your annual wellness visit. The different tests include: colonoscopy (considered the best screening method), a fecal occult blood test, a fecal DNA test, and sigmoidoscopy. 
 
-A bone mass density test is recommended when a woman turns 65 to screen for osteoporosis. This test is only recommended one time, as a screening. Some providers will use this same test as a disease monitoring tool if you already have osteoporosis. -Breast cancer screenings are recommended every other year for women of normal risk, age 54-69. 
-Cervical cancer screenings for women over age 72 are only recommended with certain risk factors. Here is a list of your current Health Maintenance items (your personalized list of preventive services) with a due date: 
Health Maintenance Due Topic Date Due  Shingles Vaccine (1 of 2) 09[birth year]1967  Pneumococcal Vaccine (1 of 1 - PPSV23) 09/05/1982  Albumin Urine Test  07/22/2020  Yearly Flu Vaccine (1) 09/01/2020  Glaucoma Screening   01/21/2021

## 2020-11-19 NOTE — PROGRESS NOTES
This is the Subsequent Medicare Annual Wellness Exam, performed 12 months or more after the Initial AWV or the last Subsequent AWV I have reviewed the patient's medical history in detail and updated the computerized patient record. Pt has had swelling in the feet but pt is not elevating her legs She has had give way sx of the knee; advised a brace She has had progressive weakness of the right UE; her right hand has been painful; She is wearing a brace to the area. Her  is diminished. She does raise the right arm and hand during the visit. She still sees Dr. Phyllis Scheuermann for bladder and UTIs; she has been on Abx. Depression Risk Factor Screening:  
 
3 most recent PHQ Screens 8/19/2020 Little interest or pleasure in doing things Not at all Feeling down, depressed, irritable, or hopeless Not at all Total Score PHQ 2 0 Alcohol Risk Screen Do you average more than 1 drink per night or more than 7 drinks a week:  No 
 
On any one occasion in the past three months have you have had more than 3 drinks containing alcohol:  No 
 
 
 
Functional Ability and Level of Safety:  
Hearing: Hearing is good. 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 Activities of Daily Living: The home contains: walker and cane Patient does total self care Ambulation: walker and a cane Fall Risk: 
Fall Risk Assessment, last 12 mths 8/19/2020 Able to walk? Yes Fall in past 12 months? No  
Fall with injury? -  
Number of falls in past 12 months - Fall Risk Score -  
 
Abuse Screen: 
Patient is not abused Cognitive Screening Has your family/caregiver stated any concerns about your memory: no 
 
Cognitive Screening: cognition intact Assessment/Plan Education and counseling provided: 
Are appropriate based on today's review and evaluation Diagnoses and all orders for this visit: 
 
1. Medicare annual wellness visit, subsequent 2. Right sided weakness 
-     CT HEAD W WO CONT; Future as above 
 treatment plan as listed below Orders Placed This Encounter  CT HEAD W WO CONT Follow-up and Dispositions · Return in about 4 months (around 3/19/2021) for htn. This has been fully explained to the patient, who indicates understanding. Health Maintenance Due Health Maintenance Due Topic Date Due  Shingrix Vaccine Age 50> (1 of 2) 09/05/1967  Pneumococcal 65+ years (1 of 1 - PPSV23) 09/05/1982  MICROALBUMIN Q1  07/22/2020  Flu Vaccine (1) 09/01/2020  GLAUCOMA SCREENING Q2Y  01/21/2021 Patient Care Team  
Patient Care Team: 
Vincent Garcia MD as PCP - Coty Rodriguez, Endy Shah MD as PCP - [facility] Empaneled Provider Edel Núñez MD (Podiatry) Keli Finney MD (Cardiology) Alexandre Carpio MD (Ophthalmology) Quinten Chaidez as Care Transitions Nurse Quinten Chaidez as Ambulatory Care Manager History Patient Active Problem List  
Diagnosis Code  Essential hypertension, benign I10  
 Hypercholesterolemia E78.00  GERD (gastroesophageal reflux disease) K21.9  Diabetes ([address] E11.9  Atrial fibrillation (HCC) I48.91  
 Paroxysmal atrial fibrillation (HCC) I48.0  Aortic valve disorder I35.9  
 Pain of left hip joint M25.552  Peripheral edema R60.9  Type 2 diabetes with nephropathy (HCC) E11.21 Past Medical History:  
Diagnosis Date  Cardiac echocardiogram 03/21/2008 EF 60-65%. No RWMA. RVSP 35 mmHg. Mild AI. Mild MR. Mild TR. Mild PI.  Cardiac nuclear imaging test 03/29/2004 A-fib. No evidence of ischemia or prior infarction. EF 59%. No WMA.  Cardiovascular lower extremity venous duplex 10/05/2011 No deep vein or superficial thrombosis bilaterally. Bilateral calf veins w/calcific changes.  Diabetes ([address] 04/2006  Essential hypertension, benign  GERD (gastroesophageal reflux disease)  Hypercholesterolemia  Hypertension  Long term (current) use of anticoagulants 3/14/2011  Paroxysmal atrial fibrillation (HCC)  S/P total hysterectomy late  Past Surgical History:  
Procedure Laterality Date  HX CATARACT REMOVAL    
 bilateral  
 HX HYSTERECTOMY  late  Current Outpatient Medications Medication Sig Dispense Refill  lisinopriL (PRINIVIL, ZESTRIL) 20 mg tablet Take 1 Tab by mouth daily. 90 Tab 3  
 lansoprazole (PREVACID) 30 mg capsule TAKE 1 CAPSULE DAILY       (BEFORE BREAKFAST) 90 Cap 3  
 gemfibroziL (LOPID) 600 mg tablet Take 1 Tab by mouth daily. 90 Tab 3  
 terazosin (HYTRIN) 5 mg capsule Take 1 Cap by mouth nightly. 90 Cap 3  
 metoprolol succinate (TOPROL-XL) 25 mg XL tablet Take 1 Tab by mouth daily. 90 Tab 3  
 digoxin (LANOXIN) 0.125 mg tablet TAKE 1 TABLET DAILY 90 Tab 3  
 metFORMIN (GLUCOPHAGE) 500 mg tablet Take 1 Tab by mouth two (2) times daily (with meals). 180 Tab 2  potassium chloride (KLOR-CON M20) 20 mEq tablet Take 1 Tab by mouth daily. 90 Tab 3  
 acetaminophen (TYLENOL EXTRA STRENGTH) 500 mg tablet Take  by mouth every six (6) hours as needed for Pain.  aspirin 81 mg tablet Take 1 Tab by mouth daily. 1 Tab 0  
 glucose blood VI test strips (ACCU-CHEK COMPACT TEST) strip by Does Not Apply route. Use as directed 1 Package 11 No Known Allergies Family History Problem Relation Age of Onset  Hypertension Mother  Heart Failure Father  at age 80 from CHF  Diabetes Sister  Diabetes Brother Social History Tobacco Use  Smoking status: Never Smoker  Smokeless tobacco: Never Used Substance Use Topics  Alcohol use: No  
 
 
Мария Zuleta, who was evaluated through a synchronous (real-time) audio-video encounter, and/or her healthcare decision maker, is aware that it is a billable service, with coverage as determined by her insurance carrier. She provided verbal consent to proceed: Yes, and patient identification was verified.  It was conducted pursuant to the emergency declaration under the [facility], [address] [place] [address] authority and the [employer] General Act. A caregiver was present when appropriate. Ability to conduct physical exam was limited. I was in the office. The patient was at home.  
 
Melissa Torres MD

## 2021-01-01 ENCOUNTER — HOSPITAL ENCOUNTER (OUTPATIENT)
Dept: PHYSICAL THERAPY | Age: 86
Discharge: HOME | End: 2021-05-11
Payer: MEDICARE

## 2021-01-01 ENCOUNTER — HOSPITAL ENCOUNTER (OUTPATIENT)
Dept: LAB | Age: 86
Discharge: HOME | End: 2021-04-21
Payer: MEDICARE

## 2021-01-01 ENCOUNTER — OFFICE VISIT (OUTPATIENT)
Dept: FAMILY MEDICINE CLINIC | Age: 86
End: 2021-01-01
Payer: MEDICARE

## 2021-01-01 ENCOUNTER — TELEPHONE (OUTPATIENT)
Dept: FAMILY MEDICINE CLINIC | Age: 86
End: 2021-01-01

## 2021-01-01 VITALS
OXYGEN SATURATION: 96 % | HEIGHT: 63 IN | SYSTOLIC BLOOD PRESSURE: 116 MMHG | WEIGHT: 121 LBS | BODY MASS INDEX: 21.44 KG/M2 | RESPIRATION RATE: 12 BRPM | DIASTOLIC BLOOD PRESSURE: 76 MMHG | HEART RATE: 57 BPM

## 2021-01-01 DIAGNOSIS — E11.21 TYPE 2 DIABETES WITH NEPHROPATHY (HCC): ICD-10-CM

## 2021-01-01 DIAGNOSIS — R29.898 RIGHT HAND WEAKNESS: Primary | ICD-10-CM

## 2021-01-01 DIAGNOSIS — I48.0 PAF (PAROXYSMAL ATRIAL FIBRILLATION) (HCC): ICD-10-CM

## 2021-01-01 LAB
ALBUMIN SERPL-MCNC: 3.6 G/DL (ref 3.4–5)
ALBUMIN/GLOB SERPL: 1.1 {RATIO} (ref 0.8–1.7)
ALP SERPL-CCNC: 65 U/L (ref 45–117)
ALT SERPL-CCNC: 9 U/L (ref 13–56)
ANION GAP SERPL CALC-SCNC: 6 MMOL/L (ref 3–18)
AST SERPL-CCNC: 10 U/L (ref 10–38)
BILIRUB SERPL-MCNC: 0.4 MG/DL (ref 0.2–1)
BUN SERPL-MCNC: 17 MG/DL (ref 7–18)
BUN/CREAT SERPL: 23 (ref 12–20)
CALCIUM SERPL-MCNC: 9.6 MG/DL (ref 8.5–10.1)
CHLORIDE SERPL-SCNC: 109 MMOL/L (ref 100–111)
CHOLEST SERPL-MCNC: 180 MG/DL
CO2 SERPL-SCNC: 27 MMOL/L (ref 21–32)
CREAT SERPL-MCNC: 0.75 MG/DL (ref 0.6–1.3)
CREAT UR-MCNC: 70 MG/DL (ref 30–125)
EST. AVERAGE GLUCOSE BLD GHB EST-MCNC: 108 MG/DL
GLOBULIN SER CALC-MCNC: 3.3 G/DL (ref 2–4)
GLUCOSE SERPL-MCNC: 86 MG/DL (ref 74–99)
HBA1C MFR BLD: 5.4 % (ref 4.2–5.6)
HDLC SERPL-MCNC: 79 MG/DL (ref 40–60)
HDLC SERPL: 2.3 {RATIO} (ref 0–5)
LDLC SERPL CALC-MCNC: 88 MG/DL (ref 0–100)
LIPID PROFILE,FLP: ABNORMAL
MICROALBUMIN UR-MCNC: 2.41 MG/DL (ref 0–3)
MICROALBUMIN/CREAT UR-RTO: 34 MG/G (ref 0–30)
POTASSIUM SERPL-SCNC: 4.5 MMOL/L (ref 3.5–5.5)
PROT SERPL-MCNC: 6.9 G/DL (ref 6.4–8.2)
SODIUM SERPL-SCNC: 142 MMOL/L (ref 136–145)
TRIGL SERPL-MCNC: 65 MG/DL (ref ?–150)
TSH SERPL DL<=0.05 MIU/L-ACNC: 2.37 UIU/ML (ref 0.36–3.74)
VLDLC SERPL CALC-MCNC: 13 MG/DL

## 2021-01-01 PROCEDURE — 36415 COLL VENOUS BLD VENIPUNCTURE: CPT

## 2021-01-01 PROCEDURE — 84443 ASSAY THYROID STIM HORMONE: CPT

## 2021-01-01 PROCEDURE — G8536 NO DOC ELDER MAL SCRN: HCPCS | Performed by: FAMILY MEDICINE

## 2021-01-01 PROCEDURE — G8427 DOCREV CUR MEDS BY ELIG CLIN: HCPCS | Performed by: FAMILY MEDICINE

## 2021-01-01 PROCEDURE — 80053 COMPREHEN METABOLIC PANEL: CPT

## 2021-01-01 PROCEDURE — 80061 LIPID PANEL: CPT

## 2021-01-01 PROCEDURE — 1090F PRES/ABSN URINE INCON ASSESS: CPT | Performed by: FAMILY MEDICINE

## 2021-01-01 PROCEDURE — G0463 HOSPITAL OUTPT CLINIC VISIT: HCPCS | Performed by: FAMILY MEDICINE

## 2021-01-01 PROCEDURE — 99214 OFFICE O/P EST MOD 30 MIN: CPT | Performed by: FAMILY MEDICINE

## 2021-01-01 PROCEDURE — 97535 SELF CARE MNGMENT TRAINING: CPT

## 2021-01-01 PROCEDURE — 97110 THERAPEUTIC EXERCISES: CPT

## 2021-01-01 PROCEDURE — 82570 ASSAY OF URINE CREATININE: CPT

## 2021-01-01 PROCEDURE — G8420 CALC BMI NORM PARAMETERS: HCPCS | Performed by: FAMILY MEDICINE

## 2021-01-01 PROCEDURE — 97165 OT EVAL LOW COMPLEX 30 MIN: CPT

## 2021-01-01 PROCEDURE — 1101F PT FALLS ASSESS-DOCD LE1/YR: CPT | Performed by: FAMILY MEDICINE

## 2021-01-01 PROCEDURE — 83036 HEMOGLOBIN GLYCOSYLATED A1C: CPT

## 2021-01-01 PROCEDURE — G8510 SCR DEP NEG, NO PLAN REQD: HCPCS | Performed by: FAMILY MEDICINE

## 2021-01-01 RX ORDER — METFORMIN HYDROCHLORIDE 500 MG/1
TABLET ORAL
Qty: 180 TAB | Refills: 2 | Status: SHIPPED | OUTPATIENT
Start: 2021-01-01

## 2021-01-01 RX ORDER — POTASSIUM CHLORIDE 1500 MG/1
TABLET, EXTENDED RELEASE ORAL
Qty: 90 TAB | Refills: 3 | Status: SHIPPED | OUTPATIENT
Start: 2021-01-01

## 2021-04-21 NOTE — PROGRESS NOTES
Chief Complaint Patient presents with  Referral Follow Up  
 Other  
  swollen right hand/loss of strength Thor Fung presents today for Chief Complaint Patient presents with  Referral Follow Up  
 Other  
  swollen right hand/loss of strength Is someone accompanying this pt? Yes Is the patient using any DME equipment during OV? No  
 
Depression Screening: 
3 most recent PHQ Screens 4/21/2021 Little interest or pleasure in doing things Not at all Feeling down, depressed, irritable, or hopeless Not at all Total Score PHQ 2 0 Learning Assessment: 
Learning Assessment 5/15/2014 PRIMARY LEARNER Patient PRIMARY LANGUAGE ENGLISH  
LEARNER PREFERENCE PRIMARY READING  
ANSWERED BY patient RELATIONSHIP SELF Fall Risk Fall Risk Assessment, last 12 mths 8/19/2020 Able to walk? Yes Fall in past 12 months? No  
Number of falls in past 12 months - Fall with injury? -  
 
 
ADL No flowsheet data found. Travel Screening:  
 Travel Screening Question   Response In the last month, have you been in contact with someone who was confirmed or suspected to have Coronavirus / COVID-19? No / Gwenevere Cockayne Have you had a COVID-19 viral test in the last 14 days? No  
 Do you have any of the following new or worsening symptoms? None of these Have you traveled internationally or domestically in the last month? No  
  
Travel History   Travel since 03/21/21 No documented travel since 03/21/21 Health Maintenance reviewed and discussed and ordered per Provider. Health Maintenance Due Topic Date Due  
 COVID-19 Vaccine (1) Never done  Shingrix Vaccine Age 50> (1 of 2) Never done  Pneumococcal 65+ years (1 of 1 - PPSV23) Never done  MICROALBUMIN Q1  07/22/2020 Rebel Puckett Coordination of Care: 1. Have you been to the ER, urgent care clinic since your last visit? Hospitalized since your last visit? No 
 
2.  Have you seen or consulted any other health care providers outside of the [address] [place] [address] since your last visit? Include any pap smears or colon screening.  No

## 2021-04-21 NOTE — PATIENT INSTRUCTIONS
DASH Diet: Care Instructions Your Care Instructions The DASH diet is an eating plan that can help lower your blood pressure. DASH stands for Dietary Approaches to Stop Hypertension. Hypertension is high blood pressure. The DASH diet focuses on eating foods that are high in calcium, potassium, and magnesium. These nutrients can lower blood pressure. The foods that are highest in these nutrients are fruits, vegetables, low-fat dairy products, nuts, seeds, and legumes. But taking calcium, potassium, and magnesium supplements instead of eating foods that are high in those nutrients does not have the same effect. The DASH diet also includes whole grains, fish, and poultry. The DASH diet is one of several lifestyle changes your doctor may recommend to lower your high blood pressure. Your doctor may also want you to decrease the amount of sodium in your diet. Lowering sodium while following the DASH diet can lower blood pressure even further than just the DASH diet alone. Follow-up care is a key part of your treatment and safety. Be sure to make and go to all appointments, and call your doctor if you are having problems. It's also a good idea to know your test results and keep a list of the medicines you take. How can you care for yourself at home? Following the DASH diet · Eat 4 to 5 servings of fruit each day. A serving is 1 medium-sized piece of fruit, ½ cup chopped or canned fruit, 1/4 cup dried fruit, or 4 ounces (½ cup) of fruit juice. Choose fruit more often than fruit juice. · Eat 4 to 5 servings of vegetables each day. A serving is 1 cup of lettuce or raw leafy vegetables, ½ cup of chopped or cooked vegetables, or 4 ounces (½ cup) of vegetable juice. Choose vegetables more often than vegetable juice. · Get 2 to 3 servings of low-fat and fat-free dairy each day. A serving is 8 ounces of milk, 1 cup of yogurt, or 1 ½ ounces of cheese. · Eat 6 to 8 servings of grains each day.  A serving is 1 slice of bread, 1 ounce of dry cereal, or ½ cup of cooked rice, pasta, or cooked cereal. Try to choose whole-grain products as much as possible. · Limit lean meat, poultry, and fish to 2 servings each day. A serving is 3 ounces, about the size of a deck of cards. · Eat 4 to 5 servings of nuts, seeds, and legumes (cooked dried beans, lentils, and split peas) each week. A serving is 1/3 cup of nuts, 2 tablespoons of seeds, or ½ cup of cooked beans or peas. · Limit fats and oils to 2 to 3 servings each day. A serving is 1 teaspoon of vegetable oil or 2 tablespoons of salad dressing. · Limit sweets and added sugars to 5 servings or less a week. A serving is 1 tablespoon jelly or jam, ½ cup sorbet, or 1 cup of lemonade. · Eat less than 2,300 milligrams (mg) of sodium a day. If you limit your sodium to 1,500 mg a day, you can lower your blood pressure even more. · Be aware that all of these are the suggested number of servings for people who eat 1,800 to 2,000 calories a day. Your recommended number of servings may be different if you need more or fewer calories. Tips for success · Start small. Do not try to make dramatic changes to your diet all at once. You might feel that you are missing out on your favorite foods and then be more likely to not follow the plan. Make small changes, and stick with them. Once those changes become habit, add a few more changes. · Try some of the following: ? Make it a goal to eat a fruit or vegetable at every meal and at snacks. This will make it easy to get the recommended amount of fruits and vegetables each day. ? Try yogurt topped with fruit and nuts for a snack or healthy dessert. ? Add lettuce, tomato, cucumber, and onion to sandwiches. ? Combine a ready-made pizza crust with low-fat mozzarella cheese and lots of vegetable toppings. Try using tomatoes, squash, spinach, broccoli, carrots, cauliflower, and onions. ?  Have a variety of cut-up vegetables with a low-fat dip as an appetizer instead of chips and dip. ? Sprinkle sunflower seeds or chopped almonds over salads. Or try adding chopped walnuts or almonds to cooked vegetables. ? Try some vegetarian meals using beans and peas. Add garbanzo or kidney beans to salads. Make burritos and tacos with mashed segovia beans or black beans. Where can you learn more? Go to http://www.gray.com/ Enter L786 in the search box to learn more about \"DASH Diet: Care Instructions. \" Current as of: August 31, 2020               Content Version: 12.8 © 0297-7752 [employer]. Care instructions adapted under license by [employer] (which disclaims liability or warranty for this information). If you have questions about a medical condition or this instruction, always ask your healthcare professional. [address] any warranty or liability for your use of this information.

## 2021-04-21 NOTE — PROGRESS NOTES
HPI: 
Lucy Garcia is a 80 y.o. female who presents today with Chief Complaint Patient presents with  Referral Follow Up  
 Other  
  swollen right hand/loss of strength Patient is here with her son who assists with the history. Inquires if she should get COVID shot- advised to do so Treated for recurrent UTI with urogyn ( Alnaif); uses a [employer] Right hand has decreased strength; she has used a hand brace to the right hand and this has helped. Uses a transport chair to ambulate. Has a lot \" gas\" Has some rhinnorrhea, puffiness under eyes; uses restasis; consider OTC claritin Patient has had less  ankle swelling;  
 
Right knee gets weak and gives way at times. Advised patient to consider a knee brace. ? Decreased hearing due to ear wax. Requesting ear exam 
 
 
Patient has diabetes and A. fib. She is due for blood work. These conditions are stable. 3 most recent PHQ Screens 4/21/2021 Little interest or pleasure in doing things Not at all Feeling down, depressed, irritable, or hopeless Not at all Total Score PHQ 2 0 PMH,  Meds, Allergies, Family History, Social history reviewed Current Outpatient Medications Medication Sig Dispense Refill  Klor-Con M20 20 mEq tablet TAKE 1 TABLET DAILY 90 Tab 3  
 lisinopriL (PRINIVIL, ZESTRIL) 20 mg tablet Take 1 Tab by mouth daily. 90 Tab 3  
 lansoprazole (PREVACID) 30 mg capsule TAKE 1 CAPSULE DAILY       (BEFORE BREAKFAST) 90 Cap 3  
 gemfibroziL (LOPID) 600 mg tablet Take 1 Tab by mouth daily. 90 Tab 3  
 terazosin (HYTRIN) 5 mg capsule Take 1 Cap by mouth nightly. 90 Cap 3  
 metoprolol succinate (TOPROL-XL) 25 mg XL tablet Take 1 Tab by mouth daily. 90 Tab 3  
 digoxin (LANOXIN) 0.125 mg tablet TAKE 1 TABLET DAILY 90 Tab 3  
 metFORMIN (GLUCOPHAGE) 500 mg tablet Take 1 Tab by mouth two (2) times daily (with meals).  180 Tab 2  
 acetaminophen (TYLENOL EXTRA STRENGTH) 500 mg tablet Take by mouth every six (6) hours as needed for Pain.  aspirin 81 mg tablet Take 1 Tab by mouth daily. 1 Tab 0  
 glucose blood VI test strips (ACCU-CHEK COMPACT TEST) strip by Does Not Apply route. Use as directed 1 Package 11 No Known Allergies ROS as per HPI Visit Vitals /76 Pulse (!) 57 Resp 12 Ht 5' 3\" (1.6 m) Wt 121 lb (54.9 kg) SpO2 96% BMI 21.43 kg/m² Physical Exam  
General appearance: alert, cooperative, no distress, appears stated age Neck: supple, symmetrical, trachea midline, no adenopathy, thyroid: not enlarged, symmetric, no tenderness/mass/nodules, no carotid bruit and no JVD; TMs are clear bilaterally Lungs: clear to auscultation bilaterally Heart: regular rate and rhythm, S1, S2 normal, no murmur, click, rub or gallop Abdomen: soft, non-tender. Bowel sounds normal. No masses,  no organomegaly Extremities: extremities normal, atraumatic, no cyanosis or edema 
 right index finger with mild edema; some TTP in the proximal DIP joint Assessment/Plan: 
Diagnoses and all orders for this visit: 1. Right hand weakness 
-     REFERRAL TO PHYSICAL THERAPY 2. Type 2 diabetes with nephropathy (HCC) Assessment & Plan: 
Well Controlled, based on history, physical exam and review of pertinent labs, studies and medications; meds reconciled; continue current treatment plan. Orders: 
-     LIPID PANEL; Future -     METABOLIC PANEL, COMPREHENSIVE; Future 
-     HEMOGLOBIN A1C WITH EAG; Future -     MICROALBUMIN, UR, RAND W/ MICROALB/CREAT RATIO; Future 3. PAF (paroxysmal atrial fibrillation) (HCC) 
-     TSH 3RD GENERATION; Future As above, patient is stable, labs as ordered. Advised to hand brace for the right hand; PT consult. Monitor other symptoms at this time. Follow-up and Dispositions · Return in about 4 months (around 8/21/2021) for htn, Chol. An After Visit Summary was printed and given to the patient.  
 
 
Amie Garcia Sonal Pelaez MD

## 2021-05-10 NOTE — TELEPHONE ENCOUNTER
Last Visit: 4/21/21 with MD Yonatan Landin  Next Appointment: 7/19/21 with MD Yonatan Landin  Previous Refill Encounter(s): 4/16/20 #180 with 2 refills    Requested Prescriptions     Pending Prescriptions Disp Refills    metFORMIN (GLUCOPHAGE) 500 mg tablet [Pharmacy Med Name: METFORMIN TAB 500MG] 180 Tab 2     Sig: TAKE 1 TABLET TWICE DAILY  WITH MEALS

## 2021-05-11 NOTE — PROGRESS NOTES
[facility]  [address]  [place], [address]  [phone] fax    Plan of Care/Statement of Necessity for Occupational Therapy Services    Patient name: Thor Fung Start of Care: 2021   Referral source: Nelsy Berger MD : 1917    Medical Diagnosis: Right hand weakness [R29.898]  [place]or: [place] MED[place]ARE / Plan: [place] MEDICARE PART A & B / Product Type: Medicare /  Onset Date:one year ago    Treatment Diagnosis: right hand weakness   Prior Hospitalization: see medical history Provider#: [id number]   Medications: Verified on Patient summary List    Comorbidities: Arthritis, diabetes, heart disease, HTN, hearing impaired   Prior Level of Function: Mod independent with ADL/IADL tasks without functional limitations of dominant right hand          The Plan of Care and following information is based on the information from the initial evaluation. Assessment/ key information: Patient is a right hand dominant 80 y.o. female with a chief complaint  of right hand swelling and stiffness beginning about one year ago. She also reports frequent dropping of items and right hand  weakness. Pt presents to skilled OT today with her son who is ambulating her in a transport chair. She has a wrist immobilization brace donned on the right wrist that she has been wearing daily over the last 8 months. 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 Upon therapist examination, she reports most of her pain in the palm of the hand and pain with right index finger flexion causing her to have reduced ability to  and hold items. Her and her son report that she is Mod (I) with ADL/IADL tasks with limitations using her dominant right hand. There is limited right wrist ROM with 57 deg flexion and 49 degrees extension.  She has mild edema of the right IF and is only able to oppose her thumb to the IF. The right IF RUIZ is 154 deg. Her sensation is intact and provocative Phalen's testing was negative at the right wrist.  Her dominant right hand  strength is 50% less than her left hand when measured using the Henri position 2 hand dynamometer. Patient received an initial evaluation today followed by education as to diagnosis, precautions and treatment plan. Patient was provided with a basic home exercise program including digit AROM. Pt was also educated on edema mgmt and compression garment wear. She was provided and demonstrated use of foam tubing to build up pen to improve  and patient reported improved ease of handwriting with this. Patient will continue to benefit from skilled OT services to modify and progress therapeutic interventions, address ROM deficits, address strength deficits, analyze and address soft tissue restrictions and instruct in home and community integration to attain goals. Evaluation Complexity: History LOW Complexity : Brief history review  Examination LOW Complexity : 1-3 performance deficits relating to physical, cognitive , or psychosocial skils that result in activity limitations and / or participation restrictions  Clinical Decision Making MEDIUM Complexity : Patient may present with comorbidities that affect occupational performnce.  Miniml to moderate modification of tasks or assistance (eg, physical or verbal ) with assesment(s) is necessary to enable patient to complete evaluation   Overall Complexity Rating: LOW   Patient would benefit from OT/Hand therapy services for the following problems:  Problem List: Pain effecting function, Decreased range of motion, Decreased strength, Edema effecting function, Decreased coordination/prehension, Decreased ADL/functional abilities , Decreased activity tolerance, Decreased flexibility/joint mobility, Decreased transfer abilities and Sensability   Treatment Plan may include any combination of the following: Therapeutic exercise, Therapeutic activities, Neuromuscular re-education, Physical agent/modality, Manual therapy, Splinting/orthoses, Patient education and ADLs/IADLs  Patient / Family readiness to learn indicated by: asking questions, trying to perform skills and interest  Persons(s) to be included in education:   patient (P) and family support person (FSP);list son  Barriers to Learning/Limitations: yes;  sensory deficits-vision/hearing/speech  Patient Goal (s): improve pencil grasp and use of fingers and hand, and pain resolution.   Patient Self Reported Health Status: good  Rehabilitation Potential: good  Short Term Goals: To be accomplished in 2  weeks: 1. Patient will be compliant with initial home exercise program to take an active role in their rehabilitation process. Status at Eval: Patient was provided with a basic home exercise program including digit AROM. 2. Patient will demonstrate a good understanding of their condition and strategies for self-management. Status at Eval: pt educated on prognosis, diagnosis, treatment plan, activity modifications, joint protection, built up handles, compression garment wear, edema mgmt    Long Term Goals: To be accomplished in 6 weeks:   1. Patient will report improved ease of handwriting using AE as needed in order to return to writing letters to family members  Status at eval: provided foam tubing for built up handle    2. Patient will report reduced pain with functional use of the hand in order to perform grooming and cooking tasks with less discomfort  Status at eval: 7/10    3. Patient will regain 200 degrees total arc of motion of the right IF to enable grasp of cylindrical objects such as a glass, handle or toothbrush.   Status at [facility]: 154 deg    Frequency / Duration: Patient to be seen 1-2 times per week for 4 weeks:    Patient/ Caregiver education and instruction: Diagnosis, prognosis, self care, activity modification, brace/ splint application and exercises  [x]  Plan of care has been reviewed with CHACON    Certification Period: 5/11/2021 - 6/9/2021    Shaneka Rome OT 5/11/2021 6:26 PM    ________________________________________________________________________    I certify that the above Therapy Services are being furnished while the patient is under my care. I agree with the treatment plan and certify that this therapy is necessary.     [de-identified] Signature:____________Date:_________TIME:________     Yordan Franco MD  ** Signature, Date and Time must be completed for valid certification **    Please sign and return to [facility] [address]  [address] [facility] [address]  [place], [address]  [phone] fax

## 2021-05-11 NOTE — PROGRESS NOTES
Hand Therapy Evaluation and Daily Note Patient Name: Lilly Sparks Date:2021 : 1917 Age: 80 y.o.y/o [x]  Patient  Verified Payor: Destiny Farfan / Plan: [address] / Product Type: Medicare /   
Referring Provider: MD Nathan Hernandez MD Visit:  2021 Onset Date:  One year ago Surgical Date: na 
Surgical Procedure: na In time:4:30 PM  Out time:5:19 PM 
Total Treatment Time (min): 49 Total Timed Codes (min): 29 
1:1 Treatment Time (MC only): 49 Visit #: 1 of 8 Treatment Area: Right hand weakness [R29.898] Precautions: 
 
Hand Dominance: right handed Hand Involved: right Total Evaluation Time:  20 History of Present Condition:  Patient is a right hand dominant 80 y.o. female with a chief complaint  of right hand swelling and stiffness beginning about one year ago. She has been wearing a right wrist immobilization brace with her daily activities to improve functional use. She also reports frequent dropping of items and right hand  weakness. Pain Rating:  
Current: (0-no pain 10-debilitating pain) mild 1-2/10 At best: (0-no pain 10-debilitating pain) mild 1/10 At worst: (0-no pain 10-debilitating pain) moderate 7/10 Location: right finger and right hand Type:  mild Better with: Tylenol, wrist brace Worse with:  
 
Medications/Allergies/Past Medical History:  See chart; reviewed with patient. Arthritis, diabetes, heart disease, HTN, hearing impaired Diagnostic Tests:  
2017 15:19) Provider Status: Open Study Result XR WRIST RT AP/LAT/OBL MIN 3V 
  Indication: Pain 
  
Comparison: None 
  
Findings: 
No acute fracture or dislocation. Osseous structures are aligned anatomically. Vascular calcifications. Degenerative changes are seen in the radiocarpal joint. Calcification is seen in the cartilage of the distal radial ulnar and ulnar 
carpal joints.  Chondrocalcinosis is also seen in the MCP joints. 
  
IMPRESSION Impression: 1. No acute process. 2.  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 This can be seen in 
calcium pyrophosphate deposition deficiency, among others. Prior Level of Function: Mod independent with ADL/IADL tasks without functional limitations of dominant right hand Current Level of Function:  Independent with limitations Social History: Pt lives with son in home Occupation/Job Requirements: retired Observation: mild edema right IF Scar/incision:   na 
Location:  Right hand Palpation:  No pain with palpation to right wrist/hand Range of Motion:   Wrist Active/Passive ROM Wrist 5/11/2021 Right Flex 57 Ext 49 UD       
RD       
       
 
 
 
THUMB ROM CHART as measured in degrees Thumb, Side Active/Passive Date: 
5/11/2021 MP   
IP Radial Abd. Palmar Abd. Opposition IF only Single Digit ROM CHART as measured in degrees Digit A/P 5/11/2021 Right IF Date Side MP 46 PIP 56 DIP 52 RUIZ 154 Strength:   Measurements: Taken with Henri Dynamometer, in Lbs Level 2 5/11/2021 Date Date Date Date Date Date Right 8 Left 16 Deficit Change Sensation:    intact Edema: mild edema of right IF Special Tests:  
Provocative test: Phalen's test - negative ADLs Feeding:        []MaxA   []ModA   []Teofilo   [] CGA   []SBA   [x]Meenakshi   []Independent UE Dressing:       []MaxA   []ModA   []Teofilo   [] CGA   []SBA   [x]Meenakshi   []Independent LE Dressing:       []MaxA   []ModA   []Teofilo   [] CGA   []SBA   [x]Meenakshi   []Independent Grooming:       []MaxA   []ModA   []Teofilo   [] CGA   []SBA   [x]Meenakshi   []Independent Toileting:       []MaxA   []ModA   []Teofilo   [] CGA   []SBA   [x]Meenakshi   []Independent Bathing:       []MaxA   []ModA   []Teofilo   [] CGA   []SBA   [x]Meenakshi   []Independent Light Meal Prep:    []MaxA   []ModA   []Teofilo   [] CGA   []SBA   [x]Meenakshi   []Independent Household/Other: []MaxA   []ModA   []Teofilo   [] CGA   []SBA   [x]Meenakshi   []Independent Adaptive Equip:     []MaxA   []ModA   []Teofilo   [] CGA   []SBA   []Meenakshi   []Independent Driving:       [x]MaxA   []ModA   []Teofilo   [] CGA   []SBA   []Meenakshi   []Independent Todays Treatment:  Patient received an initial evaluation today followed by education as to diagnosis, precautions and treatment plan. Patient was provided with a basic home exercise program including digit AROM. Pt was also educated on edema mgmt and compression garment wear. She was provided and demonstrated use of foam tubing to build up pen to improve  and patient reported improved ease of handwriting with this. OBJECTIVE 10 min Therapeutic Exercise:  [x] See flow sheet :  
Rationale: increase ROM to improve the patients ability to make a composite fist with right hand. 19 min Self Care/Home Management: prognosis, diagnosis, treatment plan, activity modifications, joint protection, built up handles, compression garment wear, edema mgmt Rationale: education  to improve the patients ability to return to full functional use of dominant right hand. With 
 [] TE 
 [] TA 
 [] neuro 
 [] other: Patient Education: [x] Review HEP [] Progressed/Changed HEP based on:  
[] positioning   [] body mechanics   [] transfers   [] heat/ice application  
[] Splint wear/care   [] Sensory re-education   [] scar management     
[] other:   
 
Pain Level (0-10 scale) post treatment: 0/10 Patient will continue to benefit from skilled OT services to modify and progress therapeutic interventions, address ROM deficits, address strength deficits, analyze and address soft tissue restrictions and instruct in home and community integration to attain goals. Assessment: Pt presents to skilled OT today with her son who is ambulating her in a transport chair.   She has a wrist immobilization brace donned on the right wrist that she has been wearing daily over the last 8 months. The most recent Xray of the right wrist taken in September 2017 was positive for chondrocalcinosis of the radiocarpal and MCP joints. Upon therapist examination, she reports most of her pain in the palm of the hand and pain with right index finger flexion causing her to have reduced ability to  and hold items. Her and her son report that she is Mod (I) with ADL/IADL tasks with limitations using her dominant right hand. There is limited right wrist ROM with 57 deg flexion and 49 degrees extension. She has mild edema of the right IF and is only able to oppose her thumb to the IF. The right IF RUIZ is 154 deg. Her sensation is intact and provocative Phalen's testing was negative at the right wrist.  Her dominant right hand  strength is 50% less than her left hand when measured using the Henri position 2 hand dynamometer. Evaluation Complexity: History LOW Complexity : Brief history review  Examination LOW Complexity : 1-3 performance deficits relating to physical, cognitive , or psychosocial skils that result in activity limitations and / or participation restrictions  Clinical Decision Making MEDIUM Complexity : Patient may present with comorbidities that affect occupational performnce. Miniml to moderate modification of tasks or assistance (eg, physical or verbal ) with assesment(s) is necessary to enable patient to complete evaluation Overall Complexity Rating: LOW Patient would benefit from OT/Hand therapy services for the following problems: 
Problem List: Pain effecting function, Decreased range of motion, Decreased strength, Edema effecting function, Decreased coordination/prehension, Decreased ADL/functional abilities , Decreased activity tolerance, Decreased flexibility/joint mobility, Decreased transfer abilities and [facility] Treatment Plan may include any combination of the following: Therapeutic exercise, Therapeutic activities, Neuromuscular re-education, Physical agent/modality, Manual therapy, Splinting/orthoses, Patient education and ADLs/IADLs Patient / Family readiness to learn indicated by: asking questions, trying to perform skills and interest 
Persons(s) to be included in education:   patient (P) and family support person (FSP);list son Barriers to Learning/Limitations: yes;  sensory deficits-vision/hearing/speech Patient Goal (s): improve pencil grasp and use of fingers and hand, and pain resolution.  Patient Self Reported Health Status: good Rehabilitation Potential: good Short Term Goals: To be accomplished in 2  weeks: 1. Patient will be compliant with initial home exercise program to take an active role in their rehabilitation process. Status at Eval: Patient was provided with a basic home exercise program including digit AROM. 2. Patient will demonstrate a good understanding of their condition and strategies for self-management. Status at Eval: pt educated on prognosis, diagnosis, treatment plan, activity modifications, joint protection, built up handles, compression garment wear, edema mgmt Long Term Goals: To be accomplished in 6 weeks: 1. Patient will report improved ease of handwriting using AE as needed in order to return to writing letters to family members Status at eval: provided foam tubing for built up handle 2. Patient will report reduced pain with functional use of the hand in order to perform grooming and cooking tasks with less discomfort Status at eval: 7/10 3. Patient will regain 200 degrees total arc of motion of the right IF to enable grasp of cylindrical objects such as a glass, handle or toothbrush. Status at Eval: 154 deg Frequency / Duration: Patient to be seen 1-2 times per week for 4 weeks: 
 
Patient/ Caregiver education and instruction: Diagnosis, prognosis, self care, activity modification, brace/ splint application and exercises Certification Period: 5/11/2021 - 6/9/2021 Cameron Draper OT, 5/11/2021 4:29 PM

## 2021-05-24 ENCOUNTER — APPOINTMENT (OUTPATIENT)
Dept: PHYSICAL THERAPY | Age: 86
End: 2021-05-24
Payer: MEDICARE

## 2021-05-25 NOTE — PROGRESS NOTES
[facility]  [address]  [place], [address]  [phone] fax    Occupational Therapy Discharge Summary    Patient name: Geovanny Barreto Start of Care: 2021   Referral source: Promise Dodge MD : 1917   Medical/Treatment Diagnosis: Right hand weakness [R29.898]  Payor: [place] [employer] / Plan: [place] MED[place]ARE PART A & B / Product Type: Medicare /  Onset Date:one year ago     Prior Hospitalization: see medical history Provider#: [id number]   Medications: Verified on Patient Summary List     Comorbidities: Arthritis, diabetes, heart disease, HTN, hearing impaired   Prior Level of Function: Mod independent with ADL/IADL tasks without functional limitations of dominant right hand    Visits from Start of Care: 1    Missed Visits: 0  Reporting Period : 2021 to 2021    Summary of Care:  Short Term Goals: To be accomplished in 2  weeks: 1. Patient will be compliant with initial home exercise program to take an active role in their rehabilitation process. Status at Eval: Patient was provided with a basic home exercise program including digit AROM.     2. Patient will demonstrate a good understanding of their condition and strategies for self-management. Status at Eval: pt educated on prognosis, diagnosis, treatment plan, activity modifications, joint protection, built up handles, compression garment wear, edema mgmt     Long Term Goals: To be accomplished in 6 weeks:              1. Patient will report improved ease of handwriting using AE as needed in order to return to writing letters to family members  Status at eval: provided foam tubing for built up handle     2. Patient will report reduced pain with functional use of the hand in order to perform grooming and cooking tasks with less discomfort  Status at eval: 7/10     3.  Patient will regain 200 degrees total arc of motion of the right IF to enable grasp of cylindrical objects such as a glass, handle or toothbrush.   Status at Eval: 154 deg    ASSESSMENT/RECOMMENDATIONS:  [x]Discontinue therapy:   Celsoakbar Healy OT 2021 9:32 AM

## 2021-05-27 ENCOUNTER — APPOINTMENT (OUTPATIENT)
Dept: PHYSICAL THERAPY | Age: 86
End: 2021-05-27
Payer: MEDICARE

## 2021-06-01 ENCOUNTER — APPOINTMENT (OUTPATIENT)
Dept: PHYSICAL THERAPY | Age: 86
End: 2021-06-01

## 2021-06-03 ENCOUNTER — APPOINTMENT (OUTPATIENT)
Dept: PHYSICAL THERAPY | Age: 86
End: 2021-06-03

## 2021-06-08 ENCOUNTER — APPOINTMENT (OUTPATIENT)
Dept: PHYSICAL THERAPY | Age: 86
End: 2021-06-08

## 2021-06-10 ENCOUNTER — APPOINTMENT (OUTPATIENT)
Dept: PHYSICAL THERAPY | Age: 86
End: 2021-06-10

## 2021-06-15 ENCOUNTER — APPOINTMENT (OUTPATIENT)
Dept: PHYSICAL THERAPY | Age: 86
End: 2021-06-15

## 2024-12-31 NOTE — ED NOTES
Procedure completed. Patient tolerated well; VSS. Site CDI. Patient to be transported to [facility]PU for hour recovery; report to be given at the bedside.     purwick placed for pt comfort